# Patient Record
Sex: FEMALE | Race: WHITE | NOT HISPANIC OR LATINO | Employment: UNEMPLOYED | URBAN - METROPOLITAN AREA
[De-identification: names, ages, dates, MRNs, and addresses within clinical notes are randomized per-mention and may not be internally consistent; named-entity substitution may affect disease eponyms.]

---

## 2021-09-16 ENCOUNTER — HOSPITAL ENCOUNTER (EMERGENCY)
Facility: HOSPITAL | Age: 31
Discharge: HOME | End: 2021-09-16
Attending: EMERGENCY MEDICINE | Admitting: EMERGENCY MEDICINE

## 2021-09-16 ENCOUNTER — APPOINTMENT (OUTPATIENT)
Dept: GENERAL RADIOLOGY | Facility: HOSPITAL | Age: 31
End: 2021-09-16

## 2021-09-16 VITALS
DIASTOLIC BLOOD PRESSURE: 77 MMHG | RESPIRATION RATE: 18 BRPM | SYSTOLIC BLOOD PRESSURE: 130 MMHG | HEIGHT: 59 IN | OXYGEN SATURATION: 95 % | HEART RATE: 100 BPM | BODY MASS INDEX: 52.09 KG/M2 | TEMPERATURE: 98.9 F | WEIGHT: 258.38 LBS

## 2021-09-16 DIAGNOSIS — E86.0 DEHYDRATION: ICD-10-CM

## 2021-09-16 DIAGNOSIS — R52 GENERALIZED BODY ACHES: ICD-10-CM

## 2021-09-16 DIAGNOSIS — Z20.822 SUSPECTED COVID-19 VIRUS INFECTION: ICD-10-CM

## 2021-09-16 DIAGNOSIS — B34.9 VIRAL SYNDROME: Primary | ICD-10-CM

## 2021-09-16 LAB
ALBUMIN SERPL-MCNC: 3.9 G/DL (ref 3.5–5.2)
ALBUMIN/GLOB SERPL: 1 G/DL
ALP SERPL-CCNC: 110 U/L (ref 39–117)
ALT SERPL W P-5'-P-CCNC: 16 U/L (ref 1–33)
ANION GAP SERPL CALCULATED.3IONS-SCNC: 15.4 MMOL/L (ref 5–15)
AST SERPL-CCNC: 18 U/L (ref 1–32)
BASOPHILS # BLD AUTO: 0.03 10*3/MM3 (ref 0–0.2)
BASOPHILS NFR BLD AUTO: 0.2 % (ref 0–1.5)
BILIRUB SERPL-MCNC: 0.6 MG/DL (ref 0–1.2)
BUN SERPL-MCNC: 11 MG/DL (ref 6–20)
BUN/CREAT SERPL: 11.6 (ref 7–25)
CALCIUM SPEC-SCNC: 8.8 MG/DL (ref 8.6–10.5)
CHLORIDE SERPL-SCNC: 99 MMOL/L (ref 98–107)
CO2 SERPL-SCNC: 19.6 MMOL/L (ref 22–29)
CREAT SERPL-MCNC: 0.95 MG/DL (ref 0.57–1)
DEPRECATED RDW RBC AUTO: 40.7 FL (ref 37–54)
EOSINOPHIL # BLD AUTO: 0 10*3/MM3 (ref 0–0.4)
EOSINOPHIL NFR BLD AUTO: 0 % (ref 0.3–6.2)
ERYTHROCYTE [DISTWIDTH] IN BLOOD BY AUTOMATED COUNT: 12.9 % (ref 12.3–15.4)
GFR SERPL CREATININE-BSD FRML MDRD: 68 ML/MIN/1.73
GLOBULIN UR ELPH-MCNC: 4.1 GM/DL
GLUCOSE SERPL-MCNC: 244 MG/DL (ref 65–99)
HCG INTACT+B SERPL-ACNC: <0.5 MIU/ML
HCT VFR BLD AUTO: 40.7 % (ref 34–46.6)
HGB BLD-MCNC: 13.8 G/DL (ref 12–15.9)
HOLD SPECIMEN: NORMAL
HOLD SPECIMEN: NORMAL
IMM GRANULOCYTES # BLD AUTO: 0.1 10*3/MM3 (ref 0–0.05)
IMM GRANULOCYTES NFR BLD AUTO: 0.8 % (ref 0–0.5)
LIPASE SERPL-CCNC: 24 U/L (ref 13–60)
LYMPHOCYTES # BLD AUTO: 1.23 10*3/MM3 (ref 0.7–3.1)
LYMPHOCYTES NFR BLD AUTO: 9.9 % (ref 19.6–45.3)
MCH RBC QN AUTO: 29.7 PG (ref 26.6–33)
MCHC RBC AUTO-ENTMCNC: 33.9 G/DL (ref 31.5–35.7)
MCV RBC AUTO: 87.5 FL (ref 79–97)
MONOCYTES # BLD AUTO: 1.09 10*3/MM3 (ref 0.1–0.9)
MONOCYTES NFR BLD AUTO: 8.8 % (ref 5–12)
NEUTROPHILS NFR BLD AUTO: 80.3 % (ref 42.7–76)
NEUTROPHILS NFR BLD AUTO: 9.96 10*3/MM3 (ref 1.7–7)
NRBC BLD AUTO-RTO: 0 /100 WBC (ref 0–0.2)
PLATELET # BLD AUTO: 177 10*3/MM3 (ref 140–450)
PMV BLD AUTO: 10.4 FL (ref 6–12)
POTASSIUM SERPL-SCNC: 3.3 MMOL/L (ref 3.5–5.2)
PROT SERPL-MCNC: 8 G/DL (ref 6–8.5)
RBC # BLD AUTO: 4.65 10*6/MM3 (ref 3.77–5.28)
SARS-COV-2 N GENE RESP QL NAA+PROBE: NOT DETECTED
SODIUM SERPL-SCNC: 134 MMOL/L (ref 136–145)
WBC # BLD AUTO: 12.41 10*3/MM3 (ref 3.4–10.8)
WHOLE BLOOD HOLD SPECIMEN: NORMAL
WHOLE BLOOD HOLD SPECIMEN: NORMAL

## 2021-09-16 PROCEDURE — 80053 COMPREHEN METABOLIC PANEL: CPT | Performed by: EMERGENCY MEDICINE

## 2021-09-16 PROCEDURE — C9803 HOPD COVID-19 SPEC COLLECT: HCPCS | Performed by: PHYSICIAN ASSISTANT

## 2021-09-16 PROCEDURE — 96374 THER/PROPH/DIAG INJ IV PUSH: CPT

## 2021-09-16 PROCEDURE — 71045 X-RAY EXAM CHEST 1 VIEW: CPT

## 2021-09-16 PROCEDURE — 99283 EMERGENCY DEPT VISIT LOW MDM: CPT

## 2021-09-16 PROCEDURE — 85025 COMPLETE CBC W/AUTO DIFF WBC: CPT | Performed by: EMERGENCY MEDICINE

## 2021-09-16 PROCEDURE — 87635 SARS-COV-2 COVID-19 AMP PRB: CPT | Performed by: PHYSICIAN ASSISTANT

## 2021-09-16 PROCEDURE — 84702 CHORIONIC GONADOTROPIN TEST: CPT | Performed by: EMERGENCY MEDICINE

## 2021-09-16 PROCEDURE — 25010000002 KETOROLAC TROMETHAMINE PER 15 MG: Performed by: PHYSICIAN ASSISTANT

## 2021-09-16 PROCEDURE — 83690 ASSAY OF LIPASE: CPT | Performed by: EMERGENCY MEDICINE

## 2021-09-16 RX ORDER — ALBUTEROL SULFATE 90 UG/1
2 AEROSOL, METERED RESPIRATORY (INHALATION) EVERY 4 HOURS PRN
Qty: 90 G | Refills: 0 | Status: SHIPPED | OUTPATIENT
Start: 2021-09-16 | End: 2022-12-08

## 2021-09-16 RX ORDER — KETOROLAC TROMETHAMINE 10 MG/1
10 TABLET, FILM COATED ORAL EVERY 6 HOURS PRN
Qty: 20 TABLET | Refills: 0 | Status: SHIPPED | OUTPATIENT
Start: 2021-09-16 | End: 2022-12-08

## 2021-09-16 RX ORDER — SODIUM CHLORIDE 0.9 % (FLUSH) 0.9 %
10 SYRINGE (ML) INJECTION AS NEEDED
Status: DISCONTINUED | OUTPATIENT
Start: 2021-09-16 | End: 2021-09-16 | Stop reason: HOSPADM

## 2021-09-16 RX ORDER — ONDANSETRON 4 MG/1
4 TABLET, ORALLY DISINTEGRATING ORAL 4 TIMES DAILY PRN
Qty: 12 TABLET | Refills: 0 | Status: SHIPPED | OUTPATIENT
Start: 2021-09-16 | End: 2022-12-10 | Stop reason: SDUPTHER

## 2021-09-16 RX ORDER — KETOROLAC TROMETHAMINE 30 MG/ML
30 INJECTION, SOLUTION INTRAMUSCULAR; INTRAVENOUS ONCE
Status: COMPLETED | OUTPATIENT
Start: 2021-09-16 | End: 2021-09-16

## 2021-09-16 RX ADMIN — SODIUM CHLORIDE 1000 ML: 9 INJECTION, SOLUTION INTRAVENOUS at 16:31

## 2021-09-16 RX ADMIN — KETOROLAC TROMETHAMINE 30 MG: 30 INJECTION, SOLUTION INTRAMUSCULAR; INTRAVENOUS at 16:32

## 2021-09-16 NOTE — ED PROVIDER NOTES
"Subjective   31 y.o. female presents with complaints of body aches, intermittent fevers (Tmax 100), chills, and dizziness for the last three days. She states she's be eating and drinking less due to abnormal taste since onset of symptoms. She is not currently experiencing nausea, but has has some episodes with food intake. She denies any abdominal pain, chest pain, shortness of breath, trouble breathing. Unsure of sick contacts. Patient feels \"dehydrated.\" No other complaints at this time. No health history to report.       History provided by:  Patient   used: No    Dizziness  Quality:  Lightheadedness  Severity:  Mild  Onset quality:  Gradual  Duration:  3 days  Timing:  Intermittent  Progression:  Resolved  Context: bending over and physical activity    Relieved by:  Being still and closing eyes  Worsened by:  Movement and standing up  Ineffective treatments:  Lying down  Associated symptoms: diarrhea and nausea    Associated symptoms: no blood in stool, no chest pain, no headaches, no hearing loss, no palpitations, no shortness of breath, no syncope, no tinnitus, no vision changes, no vomiting and no weakness    Diarrhea:     Quality:  Semi-solid    Number of occurrences:  3-4    Severity:  Mild    Duration:  3 days    Timing:  Intermittent    Progression:  Partially resolved  Nausea:     Severity:  Mild    Onset quality:  Sudden    Duration:  3 days    Timing:  Intermittent    Progression:  Resolved  Fever  Max temp prior to arrival:  100  Temp source:  Oral  Severity:  Mild  Onset quality:  Sudden  Duration:  3 days  Timing:  Intermittent  Progression:  Waxing and waning  Chronicity:  New  Relieved by:  Acetaminophen  Worsened by:  Nothing  Associated symptoms: chills, diarrhea, myalgias and nausea    Associated symptoms: no chest pain, no confusion, no congestion, no cough, no dysuria, no ear pain, no headaches, no rash, no rhinorrhea, no somnolence, no sore throat and no vomiting  "   Diarrhea:     Quality:  Semi-solid    Number of occurrences:  3-4    Severity:  Mild    Duration:  3 days    Timing:  Intermittent    Progression:  Partially resolved  Myalgias:     Location:  Generalized    Quality:  Aching    Severity:  Moderate    Onset quality:  Gradual    Timing:  Constant    Progression:  Worsening  Chills  Associated symptoms: diarrhea, fever, myalgias and nausea    Associated symptoms: no abdominal pain, no chest pain, no congestion, no cough, no ear pain, no headaches, no rash, no rhinorrhea, no shortness of breath, no sore throat and no vomiting        Review of Systems   Constitutional: Positive for chills and fever.   HENT: Negative for congestion, ear pain, hearing loss, rhinorrhea, sore throat and tinnitus.    Eyes: Negative for pain.   Respiratory: Negative for cough, chest tightness and shortness of breath.    Cardiovascular: Negative for chest pain, palpitations and syncope.   Gastrointestinal: Positive for diarrhea and nausea. Negative for abdominal pain, blood in stool and vomiting.   Genitourinary: Negative for dysuria, flank pain and hematuria.   Musculoskeletal: Positive for myalgias. Negative for joint swelling.   Skin: Negative for pallor and rash.   Neurological: Positive for dizziness. Negative for seizures, weakness and headaches.   Psychiatric/Behavioral: Negative for confusion.   All other systems reviewed and are negative.      History reviewed. No pertinent past medical history.    No Known Allergies    History reviewed. No pertinent surgical history.    History reviewed. No pertinent family history.    Social History     Socioeconomic History   • Marital status: Single     Spouse name: Not on file   • Number of children: Not on file   • Years of education: Not on file   • Highest education level: Not on file   Tobacco Use   • Smoking status: Never Smoker   • Smokeless tobacco: Never Used   Vaping Use   • Vaping Use: Never assessed   Substance and Sexual Activity   •  Alcohol use: Never   • Drug use: Never   • Sexual activity: Defer           Objective   Physical Exam  Vitals and nursing note reviewed.   Constitutional:       General: She is not in acute distress.     Appearance: Normal appearance. She is not toxic-appearing.   HENT:      Head: Normocephalic and atraumatic.      Mouth/Throat:      Mouth: Mucous membranes are moist.   Eyes:      Extraocular Movements: Extraocular movements intact.      Pupils: Pupils are equal, round, and reactive to light.   Cardiovascular:      Rate and Rhythm: Normal rate and regular rhythm.      Pulses: Normal pulses.      Heart sounds: Normal heart sounds.   Pulmonary:      Effort: Pulmonary effort is normal. No respiratory distress.      Breath sounds: Normal breath sounds.   Abdominal:      General: Abdomen is flat.      Palpations: Abdomen is soft.      Tenderness: There is no abdominal tenderness.   Musculoskeletal:         General: Normal range of motion.      Cervical back: Normal range of motion and neck supple.   Skin:     General: Skin is warm and dry.   Neurological:      Mental Status: She is alert and oriented to person, place, and time. Mental status is at baseline.         Procedures           ED Course                                           MDM  Number of Diagnoses or Management Options  Diagnosis management comments: The patient is well-appearing and in no respiratory distress.  The patient was tested for COVID-19 in the emergency department and is currently awaiting results.  The patient has a normal mental status and is neurologically intact. The patient appears well and is able to tolerate food for fluid by mouth and there's no significant dehydration. There is no respiratory distress and no signs of systemic toxicity. The history, exam, diagnostic testing and current condition do not demonstrate an infectious process such as meningitis, retropharyngeal abscess, epiglottitis, sepsis or other serious bacterial infection  requiring further testing, treatment, consultation, or admission at this time. The patient has no additional oxygen requirement. The patient has a PORT score that is suitable for outpatient treatment.  The patient has no respiratory distress, or hypoxia.  The vital signs have been stable. The patient's condition is stable and appropriate for discharge. The possible party was advised to return for worsening fever, respiratory distress, intractable vomiting, weakness, shortness of breath, chest pain, or altered mental status. The responsible party will pursue further outpatient evaluation with the primary care physician. The possible party has expressed a clear and thorough understanding and agreed to follow-up as instructed.       Amount and/or Complexity of Data Reviewed  Clinical lab tests: reviewed and ordered  Tests in the radiology section of CPT®: reviewed and ordered    Risk of Complications, Morbidity, and/or Mortality  Presenting problems: low  Diagnostic procedures: low  Management options: low  General comments: Return to ED for any new or worsening symptoms including shortness of breath, chest pain, difficultly breathing, abdominal pain.     Patient Progress  Patient progress: stable      Final diagnoses:   Suspected COVID-19 virus infection   Dehydration   Generalized body aches   Viral syndrome       ED Disposition  ED Disposition     ED Disposition Condition Comment    Discharge Stable           No follow-up provider specified.       Medication List      New Prescriptions    albuterol sulfate  (90 Base) MCG/ACT inhaler  Commonly known as: PROVENTIL HFA;VENTOLIN HFA;PROAIR HFA  Inhale 2 puffs Every 4 (Four) Hours As Needed for Wheezing.     ketorolac 10 MG tablet  Commonly known as: TORADOL  Take 1 tablet by mouth Every 6 (Six) Hours As Needed for Moderate Pain .     ondansetron ODT 4 MG disintegrating tablet  Commonly known as: ZOFRAN-ODT  Place 1 tablet under the tongue 4 (Four) Times a Day As  Needed for Nausea or Vomiting.           Where to Get Your Medications      These medications were sent to [id number] - [place], [place] - [address] AT [facility] [phone] [facility] [phone]   [address] [place] [address]    Phone: [phone]   · albuterol sulfate  (90 Base) MCG/ACT inhaler  · ketorolac 10 MG tablet  · ondansetron ODT 4 MG disintegrating tablet          Shiela Park, PAWyattC  09/16/21 3128

## 2022-12-08 ENCOUNTER — INITIAL PRENATAL (OUTPATIENT)
Dept: OBSTETRICS AND GYNECOLOGY | Facility: CLINIC | Age: 32
End: 2022-12-08

## 2022-12-08 ENCOUNTER — TELEPHONE (OUTPATIENT)
Dept: OBSTETRICS AND GYNECOLOGY | Facility: CLINIC | Age: 32
End: 2022-12-08

## 2022-12-08 VITALS — DIASTOLIC BLOOD PRESSURE: 90 MMHG | WEIGHT: 267 LBS | BODY MASS INDEX: 53.93 KG/M2 | SYSTOLIC BLOOD PRESSURE: 151 MMHG

## 2022-12-08 DIAGNOSIS — E74.818 OTHER DISORDERS OF GLUCOSE TRANSPORT: ICD-10-CM

## 2022-12-08 DIAGNOSIS — Z12.4 ENCOUNTER FOR SCREENING FOR MALIGNANT NEOPLASM OF CERVIX: ICD-10-CM

## 2022-12-08 DIAGNOSIS — R39.9 UNSPECIFIED SYMPTOMS AND SIGNS INVOLVING THE GENITOURINARY SYSTEM: ICD-10-CM

## 2022-12-08 DIAGNOSIS — Z34.80 SUPERVISION OF OTHER NORMAL PREGNANCY, ANTEPARTUM: Primary | ICD-10-CM

## 2022-12-08 DIAGNOSIS — O99.210 MATERNAL OBESITY AFFECTING PREGNANCY, ANTEPARTUM: ICD-10-CM

## 2022-12-08 DIAGNOSIS — O09.219 PREVIOUS PRETERM DELIVERY, ANTEPARTUM: ICD-10-CM

## 2022-12-08 DIAGNOSIS — Z11.59 ENCOUNTER FOR SCREENING FOR OTHER VIRAL DISEASES: ICD-10-CM

## 2022-12-08 DIAGNOSIS — Z87.59 HISTORY OF GESTATIONAL HYPERTENSION: ICD-10-CM

## 2022-12-08 DIAGNOSIS — O09.299 HISTORY OF GESTATIONAL DIABETES IN PRIOR PREGNANCY, CURRENTLY PREGNANT: ICD-10-CM

## 2022-12-08 DIAGNOSIS — Z3A.19 19 WEEKS GESTATION OF PREGNANCY: ICD-10-CM

## 2022-12-08 DIAGNOSIS — Z86.32 HISTORY OF GESTATIONAL DIABETES IN PRIOR PREGNANCY, CURRENTLY PREGNANT: ICD-10-CM

## 2022-12-08 DIAGNOSIS — Z98.891 PREVIOUS CESAREAN SECTION: ICD-10-CM

## 2022-12-08 LAB
ABO GROUP BLD: NORMAL
B-HCG UR QL: POSITIVE
BILIRUB UR QL STRIP: NEGATIVE
BLD GP AB SCN SERPL QL: NEGATIVE
CLARITY UR: ABNORMAL
COLOR UR: YELLOW
CREAT UR-MCNC: 231.8 MG/DL
EXPIRATION DATE: ABNORMAL
GLUCOSE UR STRIP-MCNC: NEGATIVE MG/DL
GLUCOSE UR STRIP-MCNC: NEGATIVE MG/DL
HGB UR QL STRIP.AUTO: NEGATIVE
HIV1+2 AB SER QL: NORMAL
INTERNAL NEGATIVE CONTROL: NEGATIVE
INTERNAL POSITIVE CONTROL: POSITIVE
KETONES UR QL STRIP: ABNORMAL
LEUKOCYTE ESTERASE UR QL STRIP.AUTO: ABNORMAL
Lab: ABNORMAL
NITRITE UR QL STRIP: NEGATIVE
PH UR STRIP.AUTO: 5.5 [PH] (ref 5–8)
PROT ?TM UR-MCNC: 58.5 MG/DL
PROT UR QL STRIP: ABNORMAL
PROT UR STRIP-MCNC: NEGATIVE MG/DL
PROT/CREAT UR: 0.25 MG/G{CREAT}
RH BLD: POSITIVE
SP GR UR STRIP: 1.03 (ref 1–1.03)
UROBILINOGEN UR QL STRIP: ABNORMAL

## 2022-12-08 PROCEDURE — 86803 HEPATITIS C AB TEST: CPT | Performed by: NURSE PRACTITIONER

## 2022-12-08 PROCEDURE — 83036 HEMOGLOBIN GLYCOSYLATED A1C: CPT | Performed by: NURSE PRACTITIONER

## 2022-12-08 PROCEDURE — 82570 ASSAY OF URINE CREATININE: CPT | Performed by: NURSE PRACTITIONER

## 2022-12-08 PROCEDURE — 36415 COLL VENOUS BLD VENIPUNCTURE: CPT | Performed by: NURSE PRACTITIONER

## 2022-12-08 PROCEDURE — 87661 TRICHOMONAS VAGINALIS AMPLIF: CPT | Performed by: NURSE PRACTITIONER

## 2022-12-08 PROCEDURE — 84156 ASSAY OF PROTEIN URINE: CPT | Performed by: NURSE PRACTITIONER

## 2022-12-08 PROCEDURE — 83020 HEMOGLOBIN ELECTROPHORESIS: CPT | Performed by: NURSE PRACTITIONER

## 2022-12-08 PROCEDURE — 87086 URINE CULTURE/COLONY COUNT: CPT | Performed by: NURSE PRACTITIONER

## 2022-12-08 PROCEDURE — 87491 CHLMYD TRACH DNA AMP PROBE: CPT | Performed by: NURSE PRACTITIONER

## 2022-12-08 PROCEDURE — 81025 URINE PREGNANCY TEST: CPT | Performed by: NURSE PRACTITIONER

## 2022-12-08 PROCEDURE — 99204 OFFICE O/P NEW MOD 45 MIN: CPT | Performed by: NURSE PRACTITIONER

## 2022-12-08 PROCEDURE — 87591 N.GONORRHOEAE DNA AMP PROB: CPT | Performed by: NURSE PRACTITIONER

## 2022-12-08 PROCEDURE — G0123 SCREEN CERV/VAG THIN LAYER: HCPCS | Performed by: NURSE PRACTITIONER

## 2022-12-08 PROCEDURE — 87624 HPV HI-RISK TYP POOLED RSLT: CPT | Performed by: NURSE PRACTITIONER

## 2022-12-08 PROCEDURE — 81001 URINALYSIS AUTO W/SCOPE: CPT | Performed by: NURSE PRACTITIONER

## 2022-12-08 PROCEDURE — 80053 COMPREHEN METABOLIC PANEL: CPT | Performed by: NURSE PRACTITIONER

## 2022-12-08 PROCEDURE — G0432 EIA HIV-1/HIV-2 SCREEN: HCPCS | Performed by: NURSE PRACTITIONER

## 2022-12-08 RX ORDER — ASPIRIN 81 MG/1
81 TABLET ORAL DAILY
Qty: 90 TABLET | Refills: 3 | Status: SHIPPED | OUTPATIENT
Start: 2022-12-08

## 2022-12-08 RX ORDER — BLOOD-GLUCOSE METER
1 KIT MISCELLANEOUS 4 TIMES DAILY
Qty: 1 KIT | Refills: 0 | Status: SHIPPED | OUTPATIENT
Start: 2022-12-08

## 2022-12-08 RX ORDER — LANCETS 28 GAUGE
EACH MISCELLANEOUS
Qty: 200 EACH | Refills: 12 | Status: SHIPPED | OUTPATIENT
Start: 2022-12-08

## 2022-12-08 NOTE — TELEPHONE ENCOUNTER
Caller: Rosie Bradley    Relationship: Self    Best call back number: [phone]    What is the best time to reach you:-CALL ANYTIME, IT IS OKAY TO LVM.    Who are you requesting to speak with (clinical staff, provider,  specific staff member): FIRST AVAILABLE    Do you require a callback: YES, PT WAS TOLD SHE WOULD GET A CALL BACK FROM [facility] REGARDING BLOOD GLUCOSE MONITORING SUPPLIES. PT HAS NOT RECEIVED A CALL BACK.    PHARMACY TOLD PT THE BLOOD READER IS NOT COVERED BY INSURANCE. PT CAN NOT AFFORD TO PAY OUT OF POCKET AT THIS TIME.

## 2022-12-08 NOTE — PROGRESS NOTES
OB Initial Visit    CC- Here for care of current pregnancy, first visit    Subjective:  32 y.o.  presenting for her first obstetrical visit.    LMP: Patient's last menstrual period was 2022.     COMPLAINS OF: Pt has no complaints, is doing well    States had a  at 36w6d with previous pregnancy at [facility] for high blood pressure, was transferred from [place].     Reviewed and updated:  OBHx, GYNHx (STDs), PMHx, Medications, Allergies, PSHx, Social Hx, Preventative Hx (PAP), Hx of abuse/safe environment, Vaccine Hx including hx of chickenpox or vaccine, Genetic Hx (pt, FOB, both families).        Objective:  /90   Wt 121 kg (267 lb)   LMP 2022   BMI 53.93 kg/m²      Urine pregnancy test is positive     General- NAD, alert and oriented, appropriate  Psych- Normal mood, good memory  Neck- No masses, no thyroid enlargement  CV- Regular rhythm, no murnurs  Resp- CTA to bases, no wheezes  Abdomen- Soft, non distended, non tender, no masses    Breast left- deferred  Breast right- deferred    External genitalia- Normal, no lesions  Urethra- Normal, no masses, non tender  Vagina- Normal, no discharge  Bladder- Normal, no masses, non tender  Cvx- Normal, no lesions, no discharge, no CMT  Uterus- Normal shape and consistency, non tender, Consistent with dates, .    Adnexa- Normal, no mass, non tender    Lymphatic- No palpable neck, axillary, or groin nodes  Ext- No edema, no cyanosis    Skin- No lesions, no rashes, no acanthosis nigricans    Assessment and Plan:  19w4d  Diagnoses and all orders for this visit:    1. Supervision of other normal pregnancy, antepartum (Primary)  Overview:  DUDLEY finalized: 23 per LMP, anatomy scan ordered    Genetic testing (NIPS-Quad)/CF/AFP:  CF previously and negative.  NIPS drawn at new OB, desires AFP but will need to wait until after ultrasound for insurance purposes    COVID: Fully vaccinated  Flu: Recommended  22  Tdap:    Rhogam:  ?Sterilization:    Anatomy US:  FU US:    PROBLEM LIST/PLAN:   Hx of GDM/prediabetes - check A1C, start QID blood sugar checks  Hx of Gest. HTN - baseline labs, start ASA 81 mg daily  Previous  - vertical skin incision, will request op note  Maternal obesity - start ASA 81 mg   delivery - states delivered at 36w6d due to gestational hypertension         Orders:  -     POC Urinalysis Dipstick  -     IGP,CtNgTv,Apt HPV,rfx 16 / 18,45  -     OB Panel With HIV  -     Urinalysis With Microscopic - Urine, Clean Catch  -     Urine Culture - Urine, Urine, Random Void  -     Hemoglobinopathy Fractionation [place]  -     POC Pregnancy, Urine  -     INVITAE NIPS  -     US Ob Detail Fetal Anatomy Single or First Gestation; Future  -     Blood Glucose Monitoring Suppl (FreeStyle Lite) w/Device kit; 1 each 4 (Four) Times a Day. Use to check blood glucose before breakfast and two hours after meals  Dispense: 1 kit; Refill: 0  -     Lancets (freestyle) lancets; Use to check blood glucose before breakfast and two hours after meals  Dispense: 200 each; Refill: 12  -     glucose blood (FREESTYLE LITE) test strip; Use to check blood glucose before breakfast and two hours after meals  Dispense: 200 each; Refill: 12    2. 19 weeks gestation of pregnancy    3. Encounter for screening for malignant neoplasm of cervix  -     IGP,CtNgTv,Apt HPV,rfx 16 / 18,45    4. Encounter for screening for other viral diseases  -     OB Panel With HIV    5. Unspecified symptoms and signs involving the genitourinary system  -     Urine Culture - Urine, Urine, Random Void    6. Other disorders of glucose transport (HCC)  -     Hemoglobin A1c    7. Previous  section  Overview:  Patient will vertical skin incision, will request op report      8. History of gestational hypertension  Overview:  Baseline labs, start ASA 81 mg    Orders:  -     Comprehensive Metabolic Panel  -     Protein / Creatinine Ratio,  Urine - Urine, Clean Catch  -     aspirin 81 MG EC tablet; Take 1 tablet by mouth Daily.  Dispense: 90 tablet; Refill: 3    9. History of gestational diabetes in prior pregnancy, currently pregnant  Overview:  States had GDM with previous pregnancy and is considered pre-diabetic.  Will check A1C and start QID blood glucose checks.     Orders:  -     Hemoglobin A1c  -     Blood Glucose Monitoring Suppl (FreeStyle Lite) w/Device kit; 1 each 4 (Four) Times a Day. Use to check blood glucose before breakfast and two hours after meals  Dispense: 1 kit; Refill: 0  -     Lancets (freestyle) lancets; Use to check blood glucose before breakfast and two hours after meals  Dispense: 200 each; Refill: 12  -     glucose blood (FREESTYLE LITE) test strip; Use to check blood glucose before breakfast and two hours after meals  Dispense: 200 each; Refill: 12    10. Maternal obesity affecting pregnancy, antepartum  Overview:  Start QID blood sugar checks, ASA 81 mg      11. Previous  delivery, antepartum  Overview:  Delivered at 36w6d due to gestational hypertension        Genetic Screening:   • NIPS    Vaccines:   • Recommend FLU vaccine this season, R/B discussed  • s/p COVID vaccine    Counseling:   • Nutrition discussed, calories, activity/exercise in pregnancy  • Discussed dietary restrictions/safety food preparation in pregnancy  • Reviewed what to expect prenatal visits, office providers and [facility] hospitalists  • Appropriate trimester precautions provided, N/V, vag bleeding, cramping  • Questions answered    Labs:   • Prenatal labs, cultures, and PAP performed (prn), CMP, Upc ratio, NIPS    Return in about 4 weeks (around 2023) for [facility] Office, OB follow up.      Margarito Jimenez, APRN  2022    [facility] [address]  [facility]  [address] [place] [address]  Dept: [phone]  Loc: [phone]

## 2022-12-09 PROBLEM — O09.899 RUBELLA NON-IMMUNE STATUS, ANTEPARTUM: Status: ACTIVE | Noted: 2022-12-09

## 2022-12-09 PROBLEM — Z28.39 RUBELLA NON-IMMUNE STATUS, ANTEPARTUM: Status: ACTIVE | Noted: 2022-12-09

## 2022-12-09 LAB
ALBUMIN SERPL-MCNC: 3.7 G/DL (ref 3.5–5.2)
ALBUMIN/GLOB SERPL: 1.3 G/DL
ALP SERPL-CCNC: 75 U/L (ref 39–117)
ALT SERPL W P-5'-P-CCNC: 12 U/L (ref 1–33)
ANION GAP SERPL CALCULATED.3IONS-SCNC: 11.1 MMOL/L (ref 5–15)
AST SERPL-CCNC: 18 U/L (ref 1–32)
BACTERIA UR QL AUTO: ABNORMAL /HPF
BASOPHILS # BLD AUTO: 0.02 10*3/MM3 (ref 0–0.2)
BASOPHILS NFR BLD AUTO: 0.2 % (ref 0–1.5)
BILIRUB SERPL-MCNC: <0.2 MG/DL (ref 0–1.2)
BUN SERPL-MCNC: 8 MG/DL (ref 6–20)
BUN/CREAT SERPL: 17.4 (ref 7–25)
CALCIUM SPEC-SCNC: 9 MG/DL (ref 8.6–10.5)
CHLORIDE SERPL-SCNC: 105 MMOL/L (ref 98–107)
CO2 SERPL-SCNC: 19.9 MMOL/L (ref 22–29)
CREAT SERPL-MCNC: 0.46 MG/DL (ref 0.57–1)
DEPRECATED RDW RBC AUTO: 43.4 FL (ref 37–54)
EGFRCR SERPLBLD CKD-EPI 2021: 129 ML/MIN/1.73
EOSINOPHIL # BLD AUTO: 0.17 10*3/MM3 (ref 0–0.4)
EOSINOPHIL NFR BLD AUTO: 1.8 % (ref 0.3–6.2)
ERYTHROCYTE [DISTWIDTH] IN BLOOD BY AUTOMATED COUNT: 13.2 % (ref 12.3–15.4)
GLOBULIN UR ELPH-MCNC: 2.9 GM/DL
GLUCOSE SERPL-MCNC: 96 MG/DL (ref 65–99)
HBA1C MFR BLD: 5.1 % (ref 4.8–5.6)
HBV SURFACE AG SERPL QL IA: NORMAL
HCT VFR BLD AUTO: 35.8 % (ref 34–46.6)
HCV AB SER DONR QL: NORMAL
HGB A MFR BLD ELPH: 97.3 % (ref 96.4–98.8)
HGB A2 MFR BLD ELPH: 2.7 % (ref 1.8–3.2)
HGB BLD-MCNC: 11.7 G/DL (ref 12–15.9)
HGB F MFR BLD ELPH: 0 % (ref 0–2)
HGB FRACT BLD-IMP: NORMAL
HGB S MFR BLD ELPH: 0 %
HYALINE CASTS UR QL AUTO: ABNORMAL /LPF
IMM GRANULOCYTES # BLD AUTO: 0.07 10*3/MM3 (ref 0–0.05)
IMM GRANULOCYTES NFR BLD AUTO: 0.7 % (ref 0–0.5)
LYMPHOCYTES # BLD AUTO: 1.52 10*3/MM3 (ref 0.7–3.1)
LYMPHOCYTES NFR BLD AUTO: 16.3 % (ref 19.6–45.3)
MCH RBC QN AUTO: 29.6 PG (ref 26.6–33)
MCHC RBC AUTO-ENTMCNC: 32.7 G/DL (ref 31.5–35.7)
MCV RBC AUTO: 90.6 FL (ref 79–97)
MONOCYTES # BLD AUTO: 0.55 10*3/MM3 (ref 0.1–0.9)
MONOCYTES NFR BLD AUTO: 5.9 % (ref 5–12)
NEUTROPHILS NFR BLD AUTO: 7.01 10*3/MM3 (ref 1.7–7)
NEUTROPHILS NFR BLD AUTO: 75.1 % (ref 42.7–76)
NRBC BLD AUTO-RTO: 0 /100 WBC (ref 0–0.2)
PLATELET # BLD AUTO: 166 10*3/MM3 (ref 140–450)
PMV BLD AUTO: 11.1 FL (ref 6–12)
POTASSIUM SERPL-SCNC: 3.6 MMOL/L (ref 3.5–5.2)
PROT SERPL-MCNC: 6.6 G/DL (ref 6–8.5)
RBC # BLD AUTO: 3.95 10*6/MM3 (ref 3.77–5.28)
RBC # UR STRIP: ABNORMAL /HPF
REF LAB TEST METHOD: ABNORMAL
RPR SER QL: NORMAL
RUBV IGG SERPL IA-ACNC: <0.9 INDEX
SODIUM SERPL-SCNC: 136 MMOL/L (ref 136–145)
SQUAMOUS #/AREA URNS HPF: ABNORMAL /HPF
WBC # UR STRIP: ABNORMAL /HPF
WBC NRBC COR # BLD: 9.34 10*3/MM3 (ref 3.4–10.8)

## 2022-12-10 ENCOUNTER — HOSPITAL ENCOUNTER (EMERGENCY)
Facility: HOSPITAL | Age: 32
Discharge: HOME | End: 2022-12-11
Attending: EMERGENCY MEDICINE | Admitting: EMERGENCY MEDICINE

## 2022-12-10 DIAGNOSIS — O23.12 ACUTE CYSTITIS DURING PREGNANCY IN SECOND TRIMESTER: Primary | ICD-10-CM

## 2022-12-10 DIAGNOSIS — R30.0 DYSURIA: ICD-10-CM

## 2022-12-10 DIAGNOSIS — Z87.59 HISTORY OF GESTATIONAL HYPERTENSION: ICD-10-CM

## 2022-12-10 LAB
ALBUMIN SERPL-MCNC: 3.8 G/DL (ref 3.5–5.2)
ALBUMIN/GLOB SERPL: 1.3 G/DL
ALP SERPL-CCNC: 77 U/L (ref 39–117)
ALT SERPL W P-5'-P-CCNC: 14 U/L (ref 1–33)
ANION GAP SERPL CALCULATED.3IONS-SCNC: 12.7 MMOL/L (ref 5–15)
AST SERPL-CCNC: 14 U/L (ref 1–32)
BACTERIA SPEC AEROBE CULT: NORMAL
BACTERIA UR QL AUTO: ABNORMAL /HPF
BASOPHILS # BLD AUTO: 0.01 10*3/MM3 (ref 0–0.2)
BASOPHILS NFR BLD AUTO: 0.1 % (ref 0–1.5)
BILIRUB SERPL-MCNC: 0.2 MG/DL (ref 0–1.2)
BILIRUB UR QL STRIP: NEGATIVE
BUN SERPL-MCNC: 10 MG/DL (ref 6–20)
BUN/CREAT SERPL: 20.4 (ref 7–25)
CALCIUM SPEC-SCNC: 9.3 MG/DL (ref 8.6–10.5)
CHLORIDE SERPL-SCNC: 107 MMOL/L (ref 98–107)
CLARITY UR: ABNORMAL
CO2 SERPL-SCNC: 20.3 MMOL/L (ref 22–29)
COD CRY URNS QL: ABNORMAL /HPF
COLOR UR: YELLOW
CREAT SERPL-MCNC: 0.49 MG/DL (ref 0.57–1)
DEPRECATED RDW RBC AUTO: 45.1 FL (ref 37–54)
EGFRCR SERPLBLD CKD-EPI 2021: 127 ML/MIN/1.73
EOSINOPHIL # BLD AUTO: 0.24 10*3/MM3 (ref 0–0.4)
EOSINOPHIL NFR BLD AUTO: 3.1 % (ref 0.3–6.2)
ERYTHROCYTE [DISTWIDTH] IN BLOOD BY AUTOMATED COUNT: 14.2 % (ref 12.3–15.4)
GLOBULIN UR ELPH-MCNC: 3 GM/DL
GLUCOSE SERPL-MCNC: 144 MG/DL (ref 65–99)
GLUCOSE UR STRIP-MCNC: NEGATIVE MG/DL
HCG INTACT+B SERPL-ACNC: NORMAL MIU/ML
HCT VFR BLD AUTO: 34.4 % (ref 34–46.6)
HGB BLD-MCNC: 11.7 G/DL (ref 12–15.9)
HGB UR QL STRIP.AUTO: NEGATIVE
HOLD SPECIMEN: NORMAL
HOLD SPECIMEN: NORMAL
HYALINE CASTS UR QL AUTO: ABNORMAL /LPF
IMM GRANULOCYTES # BLD AUTO: 0.07 10*3/MM3 (ref 0–0.05)
IMM GRANULOCYTES NFR BLD AUTO: 0.9 % (ref 0–0.5)
KETONES UR QL STRIP: NEGATIVE
LEUKOCYTE ESTERASE UR QL STRIP.AUTO: ABNORMAL
LIPASE SERPL-CCNC: 45 U/L (ref 13–60)
LYMPHOCYTES # BLD AUTO: 1.92 10*3/MM3 (ref 0.7–3.1)
LYMPHOCYTES NFR BLD AUTO: 24.6 % (ref 19.6–45.3)
MCH RBC QN AUTO: 30 PG (ref 26.6–33)
MCHC RBC AUTO-ENTMCNC: 34 G/DL (ref 31.5–35.7)
MCV RBC AUTO: 88.2 FL (ref 79–97)
MONOCYTES # BLD AUTO: 0.5 10*3/MM3 (ref 0.1–0.9)
MONOCYTES NFR BLD AUTO: 6.4 % (ref 5–12)
NEUTROPHILS NFR BLD AUTO: 5.08 10*3/MM3 (ref 1.7–7)
NEUTROPHILS NFR BLD AUTO: 64.9 % (ref 42.7–76)
NITRITE UR QL STRIP: NEGATIVE
NRBC BLD AUTO-RTO: 0 /100 WBC (ref 0–0.2)
PH UR STRIP.AUTO: 5.5 [PH] (ref 5–8)
PLATELET # BLD AUTO: 176 10*3/MM3 (ref 140–450)
PMV BLD AUTO: 10.3 FL (ref 6–12)
POTASSIUM SERPL-SCNC: 3.6 MMOL/L (ref 3.5–5.2)
PROT SERPL-MCNC: 6.8 G/DL (ref 6–8.5)
PROT UR QL STRIP: ABNORMAL
RBC # BLD AUTO: 3.9 10*6/MM3 (ref 3.77–5.28)
RBC # UR STRIP: ABNORMAL /HPF
REF LAB TEST METHOD: ABNORMAL
SODIUM SERPL-SCNC: 140 MMOL/L (ref 136–145)
SP GR UR STRIP: 1.03 (ref 1–1.03)
SQUAMOUS #/AREA URNS HPF: ABNORMAL /HPF
UROBILINOGEN UR QL STRIP: ABNORMAL
WBC # UR STRIP: ABNORMAL /HPF
WBC NRBC COR # BLD: 7.82 10*3/MM3 (ref 3.4–10.8)
WHOLE BLOOD HOLD COAG: NORMAL
WHOLE BLOOD HOLD SPECIMEN: NORMAL

## 2022-12-10 PROCEDURE — 36415 COLL VENOUS BLD VENIPUNCTURE: CPT

## 2022-12-10 PROCEDURE — 99283 EMERGENCY DEPT VISIT LOW MDM: CPT

## 2022-12-10 PROCEDURE — 81001 URINALYSIS AUTO W/SCOPE: CPT

## 2022-12-10 PROCEDURE — 83690 ASSAY OF LIPASE: CPT

## 2022-12-10 PROCEDURE — 85025 COMPLETE CBC W/AUTO DIFF WBC: CPT

## 2022-12-10 PROCEDURE — 80053 COMPREHEN METABOLIC PANEL: CPT

## 2022-12-10 PROCEDURE — 84702 CHORIONIC GONADOTROPIN TEST: CPT

## 2022-12-10 RX ORDER — CEPHALEXIN 500 MG/1
500 CAPSULE ORAL 2 TIMES DAILY
Qty: 14 CAPSULE | Refills: 0 | Status: SHIPPED | OUTPATIENT
Start: 2022-12-10 | End: 2023-02-02

## 2022-12-10 RX ORDER — ONDANSETRON 4 MG/1
4 TABLET, ORALLY DISINTEGRATING ORAL EVERY 8 HOURS PRN
Qty: 15 TABLET | Refills: 0 | Status: SHIPPED | OUTPATIENT
Start: 2022-12-10 | End: 2023-02-02

## 2022-12-10 RX ORDER — SODIUM CHLORIDE 0.9 % (FLUSH) 0.9 %
10 SYRINGE (ML) INJECTION AS NEEDED
Status: DISCONTINUED | OUTPATIENT
Start: 2022-12-10 | End: 2022-12-11 | Stop reason: HOSPADM

## 2022-12-11 VITALS
OXYGEN SATURATION: 99 % | HEART RATE: 108 BPM | WEIGHT: 265.65 LBS | RESPIRATION RATE: 20 BRPM | HEIGHT: 59 IN | BODY MASS INDEX: 53.56 KG/M2 | DIASTOLIC BLOOD PRESSURE: 84 MMHG | SYSTOLIC BLOOD PRESSURE: 141 MMHG | TEMPERATURE: 98.6 F

## 2022-12-11 NOTE — ED PROVIDER NOTES
Time: 7:49 PM EST  Chief Complaint: Dysuria  Chief Complaint   Patient presents with   • Dysuria           History of Present Illness:  Patient is a 32 y.o. year old female who presents to the emergency department with complaints of burning with urination as well as urinary frequency and urgency.  Patient reports when she does urinate at times it is only in small increments.  She states that she noticed some abnormal lab results on her Mychart from her recent OB visit a few days ago that indicated a possible urinary tract infection. She denies any fever. Denies abdominal pain, nausea, vomiting or diarrhea. She is currently 19 weeks gestation. Denies any vaginal bleeding or discharge.           HPI        Patient Care Team  Primary Care Provider: Provider, No Known    Past Medical History:     No Known Allergies  Past Medical History:   Diagnosis Date   • Gestational diabetes    • Pre-diabetes     boarderline diabetes     Past Surgical History:   Procedure Laterality Date   •  SECTION     • MANDIBLE FRACTURE SURGERY     • TONSILLECTOMY     • WISDOM TOOTH EXTRACTION       Family History   Problem Relation Age of Onset   • Diabetes Mother    • Hypertension Sister        Home Medications:  Prior to Admission medications    Medication Sig Start Date End Date Taking? Authorizing Provider   aspirin 81 MG EC tablet Take 1 tablet by mouth Daily. 22   Margarito Jimenez APRN   Blood Glucose Monitoring Suppl (FreeStyle Lite) w/Device kit 1 each 4 (Four) Times a Day. Use to check blood glucose before breakfast and two hours after meals 22   Margarito Jimenez APRN   glucose blood (FREESTYLE LITE) test strip Use to check blood glucose before breakfast and two hours after meals 22   Margarito Jimenez APRN   Lancets (freestyle) lancets Use to check blood glucose before breakfast and two hours after meals 22   Margarito Jimenez APRN   ondansetron ODT (ZOFRAN-ODT) 4 MG disintegrating tablet Place 1  "tablet under the tongue 4 (Four) Times a Day As Needed for Nausea or Vomiting. 9/16/21   Shiela Park PA-C        Social History:   Social History     Tobacco Use   • Smoking status: Never   • Smokeless tobacco: Never   Substance Use Topics   • Alcohol use: Never   • Drug use: Never         Review of Systems:  Review of Systems   Constitutional: Negative for appetite change, chills, fatigue and fever.   HENT: Negative.    Eyes: Negative.    Respiratory: Negative for shortness of breath.    Cardiovascular: Negative for chest pain and leg swelling.   Gastrointestinal: Positive for nausea. Negative for abdominal pain, diarrhea and vomiting.   Endocrine: Negative.    Genitourinary: Positive for dysuria, frequency and urgency. Negative for decreased urine volume, difficulty urinating, flank pain, hematuria, pelvic pain, vaginal bleeding, vaginal discharge and vaginal pain.   Musculoskeletal: Negative for back pain and myalgias.   Skin: Negative for color change, rash and wound.   Allergic/Immunologic: Negative.    Neurological: Negative for dizziness, syncope, weakness and headaches.   Hematological: Negative.    Psychiatric/Behavioral: Negative.         Physical Exam:  /84   Pulse 114   Temp 98.6 °F (37 °C) (Oral)   Resp 20   Ht 149.9 cm (59\")   Wt 121 kg (265 lb 10.5 oz)   LMP 07/24/2022   SpO2 99%   BMI 53.66 kg/m²     Physical Exam  Constitutional:       General: She is not in acute distress.     Appearance: Normal appearance. She is not ill-appearing or toxic-appearing.   HENT:      Head: Normocephalic and atraumatic.      Mouth/Throat:      Mouth: Mucous membranes are moist.      Pharynx: Oropharynx is clear.   Eyes:      Extraocular Movements: Extraocular movements intact.      Pupils: Pupils are equal, round, and reactive to light.   Cardiovascular:      Pulses: Normal pulses.      Heart sounds: Normal heart sounds.   Pulmonary:      Effort: Pulmonary effort is normal. No respiratory distress. "      Breath sounds: Normal breath sounds.   Abdominal:      Palpations: Abdomen is soft.      Tenderness: There is no abdominal tenderness. There is no guarding.   Musculoskeletal:         General: Normal range of motion.      Cervical back: Neck supple.   Skin:     General: Skin is warm and dry.      Capillary Refill: Capillary refill takes less than 2 seconds.      Findings: No erythema or rash.   Neurological:      General: No focal deficit present.      Mental Status: She is alert and oriented to person, place, and time.   Psychiatric:         Mood and Affect: Mood normal.         Behavior: Behavior normal.                Medications in the Emergency Department:  Medications   sodium chloride 0.9 % flush 10 mL (has no administration in time range)   cefTRIAXone (ROCEPHIN) 250 mg/ml in lidocaine 1% IM syringe (500 mg vial) (has no administration in time range)        Labs  Lab Results (last 24 hours)     Procedure Component Value Units Date/Time    CBC & Differential [[id number]]  (Abnormal) Collected: 12/10/22 1949    Specimen: Blood from Arm, Left Updated: 12/10/22 1955    Narrative:      The following orders were created for panel order CBC & Differential.  Procedure                               Abnormality         Status                     ---------                               -----------         ------                     CBC Auto Differential[[id number]]        Abnormal            Final result                 Please view results for these tests on the individual orders.    Comprehensive Metabolic Panel [[id number]]  (Abnormal) Collected: 12/10/22 1949    Specimen: Blood from Arm, Left Updated: 12/10/22 2012     Glucose 144 mg/dL      BUN 10 mg/dL      Creatinine 0.49 mg/dL      Sodium 140 mmol/L      Potassium 3.6 mmol/L      Chloride 107 mmol/L      CO2 20.3 mmol/L      Calcium 9.3 mg/dL      Total Protein 6.8 g/dL      Albumin 3.80 g/dL      ALT (SGPT) 14 U/L      AST (SGOT) 14 U/L      Alkaline  Phosphatase 77 U/L      Total Bilirubin 0.2 mg/dL      Globulin 3.0 gm/dL      A/G Ratio 1.3 g/dL      BUN/Creatinine Ratio 20.4     Anion Gap 12.7 mmol/L      eGFR 127.0 mL/min/1.73      Comment: National Kidney Foundation and American Society of Nephrology (ASN) Task Force recommended calculation based on the Chronic Kidney Disease Epidemiology Collaboration (CKD-EPI) equation refit without adjustment for race.       Narrative:      GFR Normal >60  Chronic Kidney Disease <60  Kidney Failure <15      Lipase [[id number]]  (Normal) Collected: 12/10/22 1949    Specimen: Blood from Arm, Left Updated: 12/10/22 2012     Lipase 45 U/L     hCG, Quantitative, Pregnancy [[id number]] Collected: 12/10/22 1949    Specimen: Blood from Arm, Left Updated: 12/10/22 2032     HCG Quantitative 25,012.00 mIU/mL     Narrative:      HCG Ranges by Gestational Age    Females - non-pregnant premenopausal   </= 1mIU/mL HCG  Females - postmenopausal               </= 7mIU/mL HCG    3 Weeks       5.4   -      72 mIU/mL  4 Weeks      10.2   -     708 mIU/mL  5 Weeks       217   -   8,245 mIU/mL  6 Weeks       152   -  32,177 mIU/mL  7 Weeks     4,059   - 153,767 mIU/mL  8 Weeks    31,366   - 149,094 mIU/mL  9 Weeks    59,109   - 135,901 mIU/mL  10 Weeks   44,186   - 170,409 mIU/mL  12 Weeks   27,107   - 201,615 mIU/mL  14 Weeks   24,302   -  93,646 mIU/mL  15 Weeks   12,540   -  69,747 mIU/mL  16 Weeks    8,904   -  55,332 mIU/mL  17 Weeks    8,240   -  51,793 mIU/mL  18 Weeks    9,649   -  55,271 mIU/mL    Results may be falsely decreased if patient taking Biotin.      CBC Auto Differential [[id number]]  (Abnormal) Collected: 12/10/22 1949    Specimen: Blood from Arm, Left Updated: 12/10/22 1955     WBC 7.82 10*3/mm3      RBC 3.90 10*6/mm3      Hemoglobin 11.7 g/dL      Hematocrit 34.4 %      MCV 88.2 fL      MCH 30.0 pg      MCHC 34.0 g/dL      RDW 14.2 %      RDW-SD 45.1 fl      MPV 10.3 fL      Platelets 176 10*3/mm3      Neutrophil % 64.9 %       Lymphocyte % 24.6 %      Monocyte % 6.4 %      Eosinophil % 3.1 %      Basophil % 0.1 %      Immature Grans % 0.9 %      Neutrophils, Absolute 5.08 10*3/mm3      Lymphocytes, Absolute 1.92 10*3/mm3      Monocytes, Absolute 0.50 10*3/mm3      Eosinophils, Absolute 0.24 10*3/mm3      Basophils, Absolute 0.01 10*3/mm3      Immature Grans, Absolute 0.07 10*3/mm3      nRBC 0.0 /100 WBC     Urinalysis With Microscopic If Indicated (No Culture) - Urine, Clean Catch [[id number]]  (Abnormal) Collected: 12/10/22 2015    Specimen: Urine, Clean Catch Updated: 12/10/22 2047     Color, UA Yellow     Appearance, UA Cloudy     pH, UA 5.5     Specific Gravity, UA 1.027     Glucose, UA Negative     Ketones, UA Negative     Bilirubin, UA Negative     Blood, UA Negative     Protein, UA Trace     Leuk Esterase, UA Moderate (2+)     Nitrite, UA Negative     Urobilinogen, UA 1.0 E.U./dL    Urinalysis, Microscopic Only - Urine, Clean Catch [[id number]]  (Abnormal) Collected: 12/10/22 2015    Specimen: Urine, Clean Catch Updated: 12/10/22 2142     RBC, UA 3-5 /HPF      WBC, UA 13-20 /HPF      Bacteria, UA 2+ /HPF      Squamous Epithelial Cells, UA 21-30 /HPF      Hyaline Casts, UA None Seen /LPF      Calcium Oxalate Crystals, UA Large/3+ /HPF      Methodology Manual Light Microscopy           Imaging:  No Radiology Exams Resulted Within Past 24 Hours    Procedures:  Procedures    Progress  ED Course as of 12/10/22 2240   Sat Dec 10, 2022   1948 The patient was seen and examined by meJennifer APRN, while in triage. Orders placed. Patient is awaiting disposition.   [AR]      ED Course User Index  [AR] Jennifer Rajan APRN                            The patient was initially evaluated in the triage area where orders were placed. The patient was later dispositioned by FAHEEM Salas.      The patient was advised to stay for completion of workup which includes but is not limited to communication of labs and radiological  results, reassessment and plan. The patient was advised that leaving prior to disposition by a provider could result in critical findings that are not communicated to the patient.     Medical Decision Making:  MDM  Number of Diagnoses or Management Options  Acute cystitis during pregnancy in second trimester  Dysuria  Diagnosis management comments: Based on the results of the patient´s urinalysis and urinary complaints, signs, symptoms, and diagnostic testing is consistent with a urinary tract infection. Patient is resting comfortably, is alert, and is in no distress. The repeat examination is unremarkable and benign. The patient has no signs of urosepsis. The patient was started on antibiotics in the emergency department and will be discharged with antibiotics as an outpatient. The patient was counseled to return to the ER for fever >100.5, intractable pain or vomiting, or any other concerns that the may have. The patient has expressed a clear and thorough understanding and agreed to follow up as instructed.    We did check the fetal heart tones tonight.  Heart rate of 150s noted.    CBC and CMP were overall unremarkable.  No evidence of sepsis.  Patient is afebrile.  She is hemodynamically stable.  Feel she is safely be discharged home to follow-up as outpatient.  She was medicated with an injection of Rocephin while here in the ER for treatment of her urinary tract infection.  I am additionally sending the patient home on oral antibiotics to complete course of therapy.       Amount and/or Complexity of Data Reviewed  Clinical lab tests: ordered and reviewed  Tests in the medicine section of CPT®: ordered and reviewed  Review and summarize past medical records: yes  Independent visualization of images, tracings, or specimens: yes    Risk of Complications, Morbidity, and/or Mortality  Presenting problems: moderate  Diagnostic procedures: moderate  Management options: moderate    Patient Progress  Patient progress:  stable           The following orders were placed after triage and evaluation:  Orders Placed This Encounter   Procedures   • [place] Draw   • Comprehensive Metabolic Panel   • Lipase   • Urinalysis With Microscopic If Indicated (No Culture) - Urine, Clean Catch   • hCG, Quantitative, Pregnancy   • CBC Auto Differential   • Urinalysis, Microscopic Only - Urine, Clean Catch   • NPO Diet NPO Type: Strict NPO   • Undress & Gown   • Monitor fetal heart tones   • Insert Peripheral IV   • CBC & Differential   • Green Top (Gel)   • Lavender Top   • Gold Top - SST   • Light Blue Top       Final diagnoses:   Acute cystitis during pregnancy in second trimester   Dysuria          Disposition:  ED Disposition     ED Disposition   Discharge    Condition   Stable    Comment   --             This medical record created using voice recognition software.           Jennifer Rajan, APRN  12/10/22 0767

## 2022-12-12 ENCOUNTER — TELEPHONE (OUTPATIENT)
Dept: OBSTETRICS AND GYNECOLOGY | Facility: CLINIC | Age: 32
End: 2022-12-12

## 2022-12-12 RX ORDER — NITROFURANTOIN 25; 75 MG/1; MG/1
100 CAPSULE ORAL 2 TIMES DAILY
Qty: 14 CAPSULE | Refills: 0 | Status: SHIPPED | OUTPATIENT
Start: 2022-12-12 | End: 2022-12-19

## 2022-12-12 NOTE — TELEPHONE ENCOUNTER
----- Message from FAHEEM Edouard sent at 12/12/2022 12:38 PM EST -----  Please let patient know she has a UTI, will send antibiotic to her pharmacy.

## 2022-12-12 NOTE — TELEPHONE ENCOUNTER
I have called patient left detailed message regarding results and to contact the office if have any questions.

## 2022-12-16 ENCOUNTER — TELEPHONE (OUTPATIENT)
Dept: OBSTETRICS AND GYNECOLOGY | Facility: CLINIC | Age: 32
End: 2022-12-16

## 2022-12-16 NOTE — TELEPHONE ENCOUNTER
Please let patient know her NIPS screen is negative, fetus is male if she wants to know gender.   Please ask patient how her blood glucose levels have been.

## 2022-12-16 NOTE — TELEPHONE ENCOUNTER
Aware of all results blood glucose in morning staying 104-107 and anywhere after she eats 120. Advised to keep track still and to take log with her at her next apt.

## 2022-12-18 LAB — REF LAB TEST METHOD: NORMAL

## 2022-12-19 LAB
C TRACH RRNA CVX QL NAA+PROBE: NEGATIVE
CYTOLOGIST CVX/VAG CYTO: NORMAL
CYTOLOGY CVX/VAG DOC CYTO: NORMAL
CYTOLOGY CVX/VAG DOC THIN PREP: NORMAL
DX ICD CODE: NORMAL
HIV 1 & 2 AB SER-IMP: NORMAL
HPV GENOTYPE REFLEX: NORMAL
HPV I/H RISK 4 DNA CVX QL PROBE+SIG AMP: NEGATIVE
Lab: NORMAL
N GONORRHOEA RRNA CVX QL NAA+PROBE: NEGATIVE
OTHER STN SPEC: NORMAL
RECOM F/U CVX/VAG CYTO: NORMAL
STAT OF ADQ CVX/VAG CYTO-IMP: NORMAL
T VAGINALIS RRNA SPEC QL NAA+PROBE: NEGATIVE

## 2022-12-20 ENCOUNTER — TELEPHONE (OUTPATIENT)
Dept: OBSTETRICS AND GYNECOLOGY | Facility: CLINIC | Age: 32
End: 2022-12-20

## 2022-12-20 DIAGNOSIS — B00.9 HSV INFECTION: Primary | ICD-10-CM

## 2022-12-20 RX ORDER — VALACYCLOVIR HYDROCHLORIDE 1 G/1
1000 TABLET, FILM COATED ORAL 2 TIMES DAILY
Qty: 20 TABLET | Refills: 0 | Status: SHIPPED | OUTPATIENT
Start: 2022-12-20 | End: 2023-02-02

## 2022-12-20 NOTE — TELEPHONE ENCOUNTER
Called patient aware of results patient has not ever had an outbreak or been diagnosed in the past. Aware script at pharmacy.

## 2022-12-20 NOTE — TELEPHONE ENCOUNTER
----- Message from FAHEEM Edouard sent at 12/20/2022  1:44 PM EST -----  Please let patient know her pap came back negative cytology but pathology did see cell changes consistent with herpes simplex virus.  Please ask patient if she has ever had an outbreak or been diagnosed.  Would recommend a course of valtrex, can send to pharmacy.

## 2022-12-29 ENCOUNTER — HOSPITAL ENCOUNTER (OUTPATIENT)
Dept: ULTRASOUND IMAGING | Facility: HOSPITAL | Age: 32
Discharge: HOME | End: 2022-12-29
Admitting: NURSE PRACTITIONER

## 2022-12-29 DIAGNOSIS — Z34.80 SUPERVISION OF OTHER NORMAL PREGNANCY, ANTEPARTUM: ICD-10-CM

## 2022-12-29 PROCEDURE — 76811 OB US DETAILED SNGL FETUS: CPT

## 2023-01-03 ENCOUNTER — TELEPHONE (OUTPATIENT)
Dept: OBSTETRICS AND GYNECOLOGY | Facility: CLINIC | Age: 33
End: 2023-01-03
Payer: MEDICARE

## 2023-01-03 DIAGNOSIS — Z34.80 SUPERVISION OF OTHER NORMAL PREGNANCY, ANTEPARTUM: Primary | ICD-10-CM

## 2023-01-03 NOTE — TELEPHONE ENCOUNTER
Called and reviewed ultrasound findings with patient, discussed need for follow up for better visualization of heart and spine.  Verbalizes understanding.

## 2023-01-06 ENCOUNTER — PATIENT OUTREACH (OUTPATIENT)
Dept: LABOR AND DELIVERY | Facility: HOSPITAL | Age: 33
End: 2023-01-06
Payer: MEDICARE

## 2023-01-06 ENCOUNTER — ROUTINE PRENATAL (OUTPATIENT)
Dept: OBSTETRICS AND GYNECOLOGY | Facility: CLINIC | Age: 33
End: 2023-01-06
Payer: MEDICARE

## 2023-01-06 ENCOUNTER — REFERRAL TRIAGE (OUTPATIENT)
Dept: LABOR AND DELIVERY | Facility: HOSPITAL | Age: 33
End: 2023-01-06
Payer: MEDICARE

## 2023-01-06 VITALS — BODY MASS INDEX: 54.33 KG/M2 | DIASTOLIC BLOOD PRESSURE: 82 MMHG | WEIGHT: 269 LBS | SYSTOLIC BLOOD PRESSURE: 131 MMHG

## 2023-01-06 DIAGNOSIS — Z86.32 HISTORY OF GESTATIONAL DIABETES IN PRIOR PREGNANCY, CURRENTLY PREGNANT: ICD-10-CM

## 2023-01-06 DIAGNOSIS — O99.210 MATERNAL OBESITY AFFECTING PREGNANCY, ANTEPARTUM: ICD-10-CM

## 2023-01-06 DIAGNOSIS — O09.299 HISTORY OF GESTATIONAL DIABETES IN PRIOR PREGNANCY, CURRENTLY PREGNANT: ICD-10-CM

## 2023-01-06 DIAGNOSIS — B00.9 HSV INFECTION: ICD-10-CM

## 2023-01-06 DIAGNOSIS — Z34.80 SUPERVISION OF OTHER NORMAL PREGNANCY, ANTEPARTUM: Primary | ICD-10-CM

## 2023-01-06 DIAGNOSIS — Z87.59 HISTORY OF GESTATIONAL HYPERTENSION: ICD-10-CM

## 2023-01-06 PROBLEM — O16.2 ELEVATED BLOOD PRESSURE AFFECTING PREGNANCY IN SECOND TRIMESTER, ANTEPARTUM: Status: ACTIVE | Noted: 2023-01-06

## 2023-01-06 LAB
GLUCOSE UR STRIP-MCNC: NEGATIVE MG/DL
PROT UR STRIP-MCNC: NEGATIVE MG/DL

## 2023-01-06 PROCEDURE — 99214 OFFICE O/P EST MOD 30 MIN: CPT | Performed by: STUDENT IN AN ORGANIZED HEALTH CARE EDUCATION/TRAINING PROGRAM

## 2023-01-06 NOTE — OUTREACH NOTE
[facility]  Enrollment    Current Estimated Gestational Age: 23w5d    Questions/Answers    Flowsheet Row Responses   Would like to participate? Yes   Date of Intake Visit 01/06/23        Intake Assessment    Flowsheet Row Responses   Best Method for Contacting Cell   Currently Employed No   Able to keep appointments as scheduled Yes   Gender(s) and Name(s) Male - [place]   Resources Presently Utilizing: WIC (Women, Infant, Children), Other   Other Resources Utilizing: SNAP   Other Education Insurance benefits/Incentives, Transportation Assistance          Learning Assessment    Flowsheet Row Responses   Relationship Patient   Does the learner have any barriers to learning? No Barriers   What is the preferred language of the learner for medical teaching? English   Is an  required? No          Met with patient at [facility] at office visit. Lives locally. No transportation. Uses [employer]. Walks to most places, does have a friend that lives close that can give rides occasionally. [facility] works, patient is [employer]. Is involved with [employer] and other supportive organizations. Does have SNAP but states has times that food can get tight. Due to transportation, unable to get to places such as [employer] for food. Denies any needs for 1 year old at home. Was sent home with 24 hour urine, instructions given on how to collect and take to hospital. No other questions, needs or concerns. Contact information for myself given and encouraged to reach out for any future questions, needs or concerns.       Tobacco, Alcohol, and Drug History     reports that she has never smoked. She has never used smokeless tobacco.   reports no history of alcohol use.   reports no history of drug use.    Terese Solorio RN  Maternity Nurse Navigator    1/6/2023, 16:45 EST

## 2023-01-06 NOTE — PROGRESS NOTES
OB FOLLOW UP  Complaint   Chief Complaint   Patient presents with   • Routine Prenatal Visit            Rosie Bradley is a 32 y.o.  23w5d patient being seen today for her obstetrical follow up visit. Patient denies decreased fetal movement, contractions, loss of fluid or vaginal bleeding. Reports food scarcity. Has been checking blood sugars. Did not bring in logs today. Had U/S on , was told limited views. Compliant with baby ASA and PNV.     Her prenatal care is complicated by (and status) :    Patient Active Problem List   Diagnosis   • Supervision of other normal pregnancy, antepartum   • Previous  section   • Maternal obesity affecting pregnancy, antepartum   • History of gestational hypertension   • History of gestational diabetes in prior pregnancy, currently pregnant   • Previous  delivery, antepartum   • Rubella non-immune status, antepartum   • HSV infection   • Elevated blood pressure affecting pregnancy in second trimester, antepartum       All other systems reviewed and are negative.     The additional following portions of the patient's history were reviewed and updated as appropriate: allergies, current medications, past family history, past medical history, past social history, past surgical history and problem list.      EXAM:     Vital signs: /82   Wt 122 kg (269 lb)   LMP 2022   BMI 54.33 kg/m²   Appearance/psychiatric: To be in no distress  Constitutional: The patient is well nourished.  Cardiovascular: She does not have edema.  Respiratory: Respiratory effort is normal.  Gastrointestinal: Abdomen is soft, gravid, nontender, no rashes, heart tones are present, fundal height is unable to obtain 2/2 body habitus    Pelvic Exam: No    Urine glucose/protein: See prenatal flowsheet       Assessment and Plan    Problem List Items Addressed This Visit        Gravid and     History of gestational diabetes in prior pregnancy, currently pregnant     Overview     States had GDM with previous pregnancy and is considered pre-diabetic.  Will check A1C and start QID blood glucose checks.   22 A1C 5.1  2023 continue with FSBS as approaching 24 weeks          Relevant Medications    Blood Glucose Monitoring Suppl (FreeStyle Lite) w/Device kit    Lancets (freestyle) lancets    glucose blood (FREESTYLE LITE) test strip    History of gestational hypertension    Overview     Baseline labs - ALT/AST normal 22, P/C ratio 0.25, start ASA 81 mg         Relevant Medications    aspirin 81 MG EC tablet    Other Relevant Orders    Protein, Urine, 24 Hour - Urine, Clean Catch    Maternal obesity affecting pregnancy, antepartum    Overview     Start QID blood sugar checks, ASA 81 mg         Relevant Orders    Ambulatory Referral to MFM/Perinatology (Completed)    Supervision of other normal pregnancy, antepartum - Primary    Overview     DUDLEY finalized: 23 per LMP, anatomy scan ordered    Genetic testing (NIPS-Quad)/CF/AFP:  CF previously and negative.  NIPS negative, desires AFP but will need to wait until after ultrasound for insurance purposes    COVID: Fully vaccinated  Flu: Recommended 22  Tdap:    Rhogam:  ?Sterilization:    Anatomy US: 22 normal anatomy except limited views of heart and spine, follow up ordered. Posterior placenta.   FU US:    PROBLEM LIST/PLAN:   Hx of GDM/prediabetes - check A1C, start QID blood sugar checks  Hx of Gest. HTN - baseline labs (AST/ALT normal, P/C ratio 0.25), start ASA 81 mg daily  Previous  - vertical skin incision, will request op note  Maternal obesity - start ASA 81 mg   delivery - states delivered at 36w6d due to gestational hypertension  HSV changes noted on pap              Relevant Medications    Blood Glucose Monitoring Suppl (FreeStyle Lite) w/Device kit    Lancets (freestyle) lancets    glucose blood (FREESTYLE LITE) test strip    Other Relevant Orders    POC Urinalysis Dipstick  (Completed)       Other    HSV infection    Relevant Medications    valACYclovir (Valtrex) 1000 MG tablet       Impression  1. Pregnancy at 23w5d  2. Fetal status reassuring.   3. Activity and Exercise discussed.    Plan  1. Limited views on anatomy scan recommedation for level II U/S with MFM  2. Continue with FSBS four times a day  3. To meet with social work today for limited resources  4. 24 TUP for baseline labs given hx of GHTN and elevated BP noted at NOB, suspect underlying CHTN  5. Follow up 2 weeks       Patient was counseled to the following pregnancy precautions:  • Decreased fetal movement, if concern for decreased fetal movement please perform fetal kick counts you are looking for 10 movements in 2 hours.  If concern for fetal movement and not meeting that criteria, please present to triage for evaluation.  • Contractions occurring every 5 minutes for over an hour, lasting 30 to 60 seconds and progressively causing more discomfort, please seek medical attention to rule out labor  • If you believe that your water is broken, place a sanitary pad.  If pad fills in short period of time i.e. less than 5 minutes, take off pad placed another pad.  If this is saturated please present for rule out rupture of membranes  • Vaginal bleeding can be normal in pregnancy, this usually takes a form of spotting.  If having heavier bleeding like a menstrual period please present for evaluation; especially in light of severe abdominal pain this could represent a placental abruption.  • Keep all scheduled appointments as recommended.        Parth Power MD  01/06/2023

## 2023-01-09 ENCOUNTER — LAB (OUTPATIENT)
Dept: LAB | Facility: HOSPITAL | Age: 33
End: 2023-01-09
Payer: MEDICARE

## 2023-01-09 PROCEDURE — 84156 ASSAY OF PROTEIN URINE: CPT | Performed by: STUDENT IN AN ORGANIZED HEALTH CARE EDUCATION/TRAINING PROGRAM

## 2023-01-09 PROCEDURE — 81050 URINALYSIS VOLUME MEASURE: CPT | Performed by: STUDENT IN AN ORGANIZED HEALTH CARE EDUCATION/TRAINING PROGRAM

## 2023-01-10 LAB
COLLECT DURATION TIME UR: 24 HRS
PROT 24H UR-MRATE: 220.1 MG/24HOURS (ref 0–150)
SPECIMEN VOL 24H UR: 1350 ML

## 2023-01-19 ENCOUNTER — ROUTINE PRENATAL (OUTPATIENT)
Dept: OBSTETRICS AND GYNECOLOGY | Facility: CLINIC | Age: 33
End: 2023-01-19
Payer: MEDICARE

## 2023-01-19 VITALS — WEIGHT: 270 LBS | BODY MASS INDEX: 54.53 KG/M2 | SYSTOLIC BLOOD PRESSURE: 126 MMHG | DIASTOLIC BLOOD PRESSURE: 80 MMHG

## 2023-01-19 DIAGNOSIS — Z86.32 HISTORY OF GESTATIONAL DIABETES: ICD-10-CM

## 2023-01-19 DIAGNOSIS — Z34.80 SUPERVISION OF OTHER NORMAL PREGNANCY, ANTEPARTUM: Primary | ICD-10-CM

## 2023-01-19 LAB
DEPRECATED RDW RBC AUTO: 45.6 FL (ref 37–54)
ERYTHROCYTE [DISTWIDTH] IN BLOOD BY AUTOMATED COUNT: 13.6 % (ref 12.3–15.4)
GLUCOSE UR STRIP-MCNC: NEGATIVE MG/DL
HCT VFR BLD AUTO: 33.8 % (ref 34–46.6)
HGB BLD-MCNC: 11.4 G/DL (ref 12–15.9)
MCH RBC QN AUTO: 31 PG (ref 26.6–33)
MCHC RBC AUTO-ENTMCNC: 33.7 G/DL (ref 31.5–35.7)
MCV RBC AUTO: 91.8 FL (ref 79–97)
PLATELET # BLD AUTO: 186 10*3/MM3 (ref 140–450)
PMV BLD AUTO: 10.8 FL (ref 6–12)
PROT UR STRIP-MCNC: ABNORMAL MG/DL
RBC # BLD AUTO: 3.68 10*6/MM3 (ref 3.77–5.28)
WBC NRBC COR # BLD: 9.47 10*3/MM3 (ref 3.4–10.8)

## 2023-01-19 PROCEDURE — 83036 HEMOGLOBIN GLYCOSYLATED A1C: CPT | Performed by: OBSTETRICS & GYNECOLOGY

## 2023-01-19 PROCEDURE — 85027 COMPLETE CBC AUTOMATED: CPT | Performed by: OBSTETRICS & GYNECOLOGY

## 2023-01-19 PROCEDURE — 99213 OFFICE O/P EST LOW 20 MIN: CPT | Performed by: OBSTETRICS & GYNECOLOGY

## 2023-01-19 NOTE — PROGRESS NOTES
Chief Complaint:  Scheduled OB visit    HPI: 32 y.o.  at 25w4d   Positive baby movement  No blood sugar testing available today.  Cannot do Glucola today.  Spouse needs to go to work.  Check hemoglobin A1c today.    Vitals:    23 1010   BP: 126/80   Weight: 122 kg (270 lb)       See OB flowsheet also for pregnancy related data.    A/P  Intrauterine pregnancy at 25w4d   Diagnoses and all orders for this visit:    1. Supervision of other normal pregnancy, antepartum (Primary)  Overview:  DUDLEY finalized: 23 per LMP, anatomy scan ordered    Genetic testing (NIPS-Quad)/CF/AFP:  CF previously and negative.  NIPS negative, desires AFP but will need to wait until after ultrasound for insurance purposes    COVID: Fully vaccinated  Flu: Recommended 22  Tdap:    Rhogam:  ?Sterilization:    Anatomy US: 22 normal anatomy except limited views of heart and spine, follow up ordered. Posterior placenta.   FU US:    PROBLEM LIST/PLAN:   Hx of GDM/prediabetes - check A1C, start QID blood sugar checks  Hx of Gest. HTN - baseline labs (AST/ALT normal, P/C ratio 0.25), start ASA 81 mg daily  Previous  - vertical skin incision, will request op note  Maternal obesity - start ASA 81 mg   delivery - states delivered at 36w6d due to gestational hypertension  HSV changes noted on pap         Orders:  -     POC Urinalysis Dipstick  -     CBC (No Diff)    2. History of gestational diabetes  -     Hemoglobin A1c      Continue prenatal vitamins.  Keep MFM visit with ultrasound    PLAN:   Return in about 4 weeks (around 2023).    Edgardo Travis Sr., MD  2023 10:25 EST

## 2023-01-20 LAB — HBA1C MFR BLD: 4.8 % (ref 4.8–5.6)

## 2023-02-01 ENCOUNTER — PATIENT OUTREACH (OUTPATIENT)
Dept: LABOR AND DELIVERY | Facility: HOSPITAL | Age: 33
End: 2023-02-01
Payer: MEDICARE

## 2023-02-01 NOTE — OUTREACH NOTE
[facility]  Check-In    Current Estimated Gestational Age: 27w3d    Questions/Answers    Flowsheet Row Responses   Best Method for Contacting Cell          Spoke with patient via phone. Office staff wanting to check in with patient, left [facility] AMA due to multiple circumstances Monday. Had elevated B/P then. No future appointment scheduled with them. Appointment at OB office 2/15. States is doing well. Says they wanted to admit her in [place] but unable to stay due to  reasons. Has reached out to [facility] for resources. Is willing to come into OB office sooner than scheduled appointment.  contacted and message left. Will reach back out to patient with POC.      Terese Solorio RN  Maternity Nurse Navigator    2/1/2023, 16:03 EST

## 2023-02-02 ENCOUNTER — ROUTINE PRENATAL (OUTPATIENT)
Dept: OBSTETRICS AND GYNECOLOGY | Facility: CLINIC | Age: 33
End: 2023-02-02
Payer: MEDICARE

## 2023-02-02 ENCOUNTER — HOSPITAL ENCOUNTER (OUTPATIENT)
Facility: HOSPITAL | Age: 33
Discharge: HOME | End: 2023-02-02
Attending: OBSTETRICS & GYNECOLOGY | Admitting: STUDENT IN AN ORGANIZED HEALTH CARE EDUCATION/TRAINING PROGRAM
Payer: MEDICARE

## 2023-02-02 VITALS — RESPIRATION RATE: 18 BRPM | DIASTOLIC BLOOD PRESSURE: 70 MMHG | SYSTOLIC BLOOD PRESSURE: 150 MMHG | HEART RATE: 90 BPM

## 2023-02-02 VITALS — SYSTOLIC BLOOD PRESSURE: 150 MMHG | WEIGHT: 272 LBS | BODY MASS INDEX: 54.94 KG/M2 | DIASTOLIC BLOOD PRESSURE: 90 MMHG

## 2023-02-02 DIAGNOSIS — Z87.59 HISTORY OF GESTATIONAL HYPERTENSION: ICD-10-CM

## 2023-02-02 DIAGNOSIS — O09.299 HISTORY OF GESTATIONAL DIABETES IN PRIOR PREGNANCY, CURRENTLY PREGNANT: ICD-10-CM

## 2023-02-02 DIAGNOSIS — Z86.32 HISTORY OF GESTATIONAL DIABETES IN PRIOR PREGNANCY, CURRENTLY PREGNANT: ICD-10-CM

## 2023-02-02 DIAGNOSIS — Z34.80 SUPERVISION OF OTHER NORMAL PREGNANCY, ANTEPARTUM: Primary | ICD-10-CM

## 2023-02-02 DIAGNOSIS — Z13.1 ENCOUNTER FOR SCREENING FOR DIABETES MELLITUS: ICD-10-CM

## 2023-02-02 PROBLEM — O09.219 PREVIOUS PRETERM DELIVERY, ANTEPARTUM: Status: RESOLVED | Noted: 2022-12-08 | Resolved: 2023-02-02

## 2023-02-02 PROBLEM — Z30.09 UNWANTED FERTILITY: Status: ACTIVE | Noted: 2023-02-02

## 2023-02-02 LAB
ALBUMIN SERPL-MCNC: 3.8 G/DL (ref 3.5–5.2)
ALBUMIN/GLOB SERPL: 1.2 G/DL
ALP SERPL-CCNC: 108 U/L (ref 39–117)
ALT SERPL W P-5'-P-CCNC: 7 U/L (ref 1–33)
ANION GAP SERPL CALCULATED.3IONS-SCNC: 10.5 MMOL/L (ref 5–15)
AST SERPL-CCNC: 13 U/L (ref 1–32)
BILIRUB SERPL-MCNC: 0.2 MG/DL (ref 0–1.2)
BUN SERPL-MCNC: 4 MG/DL (ref 6–20)
BUN/CREAT SERPL: 9.1 (ref 7–25)
CALCIUM SPEC-SCNC: 8.8 MG/DL (ref 8.6–10.5)
CHLORIDE SERPL-SCNC: 104 MMOL/L (ref 98–107)
CO2 SERPL-SCNC: 21.5 MMOL/L (ref 22–29)
CREAT SERPL-MCNC: 0.44 MG/DL (ref 0.57–1)
CREAT UR-MCNC: 209.8 MG/DL
DEPRECATED RDW RBC AUTO: 44.8 FL (ref 37–54)
EGFRCR SERPLBLD CKD-EPI 2021: 130.4 ML/MIN/1.73
ERYTHROCYTE [DISTWIDTH] IN BLOOD BY AUTOMATED COUNT: 13.9 % (ref 12.3–15.4)
GLOBULIN UR ELPH-MCNC: 3.2 GM/DL
GLUCOSE 1H P GLC SERPL-MCNC: 148 MG/DL (ref 65–139)
GLUCOSE P FAST SERPL-MCNC: 147 MG/DL (ref 65–94)
GLUCOSE SERPL-MCNC: 155 MG/DL (ref 65–99)
GLUCOSE UR STRIP-MCNC: NEGATIVE MG/DL
HCT VFR BLD AUTO: 32.2 % (ref 34–46.6)
HGB BLD-MCNC: 10.9 G/DL (ref 12–15.9)
MCH RBC QN AUTO: 30.4 PG (ref 26.6–33)
MCHC RBC AUTO-ENTMCNC: 33.9 G/DL (ref 31.5–35.7)
MCV RBC AUTO: 89.9 FL (ref 79–97)
PLATELET # BLD AUTO: 167 10*3/MM3 (ref 140–450)
PMV BLD AUTO: 10.7 FL (ref 6–12)
POTASSIUM SERPL-SCNC: 3.1 MMOL/L (ref 3.5–5.2)
PROT ?TM UR-MCNC: 80.9 MG/DL
PROT SERPL-MCNC: 7 G/DL (ref 6–8.5)
PROT UR STRIP-MCNC: ABNORMAL MG/DL
PROT/CREAT UR: 0.39 MG/G{CREAT}
RBC # BLD AUTO: 3.58 10*6/MM3 (ref 3.77–5.28)
SODIUM SERPL-SCNC: 136 MMOL/L (ref 136–145)
WBC NRBC COR # BLD: 7.94 10*3/MM3 (ref 3.4–10.8)

## 2023-02-02 PROCEDURE — 82947 ASSAY GLUCOSE BLOOD QUANT: CPT | Performed by: STUDENT IN AN ORGANIZED HEALTH CARE EDUCATION/TRAINING PROGRAM

## 2023-02-02 PROCEDURE — 80053 COMPREHEN METABOLIC PANEL: CPT | Performed by: STUDENT IN AN ORGANIZED HEALTH CARE EDUCATION/TRAINING PROGRAM

## 2023-02-02 PROCEDURE — 82570 ASSAY OF URINE CREATININE: CPT | Performed by: STUDENT IN AN ORGANIZED HEALTH CARE EDUCATION/TRAINING PROGRAM

## 2023-02-02 PROCEDURE — G0463 HOSPITAL OUTPT CLINIC VISIT: HCPCS

## 2023-02-02 PROCEDURE — 84156 ASSAY OF PROTEIN URINE: CPT | Performed by: STUDENT IN AN ORGANIZED HEALTH CARE EDUCATION/TRAINING PROGRAM

## 2023-02-02 PROCEDURE — 85027 COMPLETE CBC AUTOMATED: CPT | Performed by: STUDENT IN AN ORGANIZED HEALTH CARE EDUCATION/TRAINING PROGRAM

## 2023-02-02 PROCEDURE — 0502F SUBSEQUENT PRENATAL CARE: CPT | Performed by: STUDENT IN AN ORGANIZED HEALTH CARE EDUCATION/TRAINING PROGRAM

## 2023-02-02 PROCEDURE — 81050 URINALYSIS VOLUME MEASURE: CPT

## 2023-02-02 PROCEDURE — 82950 GLUCOSE TEST: CPT | Performed by: STUDENT IN AN ORGANIZED HEALTH CARE EDUCATION/TRAINING PROGRAM

## 2023-02-02 NOTE — PROGRESS NOTES
OB FOLLOW UP  Complaint   Chief Complaint   Patient presents with   • Routine Prenatal Visit            Rosie Bradley is a 32 y.o.  27w4d patient being seen today for her obstetrical follow up visit. Patient denies decreased fetal movement, contractions, loss of fluid or vaginal bleeding.  Patient reports persistent headache on the right temporal area unrelieved with Tylenol.  Patient was seen by maternal-fetal medicine specialist earlier this week for level 2 ultrasound given limited findings on ultrasound at [place].  At that appointment patient was having elevated pressures and was offered admission to the hospital for preeclampsia/blood pressure monitoring.  Patient left office AMA secondary to social concerns about transportation.  She reports consistent headache still ongoing.  Legal issues have been resolved.  She does not desire future pregnancies.    Her prenatal care is complicated by (and status) :    Patient Active Problem List   Diagnosis   • Supervision of other normal pregnancy, antepartum   • Previous  section   • Maternal obesity affecting pregnancy, antepartum   • History of gestational hypertension   • History of gestational diabetes in prior pregnancy, currently pregnant   • Rubella non-immune status, antepartum   • HSV infection   • Elevated blood pressure affecting pregnancy in second trimester, antepartum   • Unwanted fertility       All other systems reviewed and are negative.     The additional following portions of the patient's history were reviewed and updated as appropriate: allergies, current medications, past family history, past medical history, past social history, past surgical history and problem list.      EXAM:     Vital signs: /90   Wt 123 kg (272 lb)   LMP 2022   BMI 54.94 kg/m²   Appearance/psychiatric: To be in no distress  Constitutional: The patient is well nourished.  Cardiovascular: She does not have edema.  Respiratory: Respiratory  effort is normal.  Gastrointestinal: Abdomen is soft, gravid, nontender, no rashes, heart tones are present, fundal height is Unable to be obtained secondary to body habitus    Pelvic Exam: No    Urine glucose/protein: See prenatal flowsheet       Assessment and Plan    Problem List Items Addressed This Visit        Gravid and     History of gestational diabetes in prior pregnancy, currently pregnant    Overview     States had GDM with previous pregnancy and is considered pre-diabetic.  Will check A1C and start QID blood glucose checks.   22 A1C 5.1  2023 continue with FSBS as approaching 24 weeks   2023 1 hour glucose tolerance testing         Relevant Medications    Blood Glucose Monitoring Suppl (FreeStyle Lite) w/Device kit    Lancets (freestyle) lancets    glucose blood (FREESTYLE LITE) test strip    History of gestational hypertension    Overview     Baseline labs - ALT/AST normal 22, P/C ratio 0.25, start ASA 81 mg  2023 patient to labor and delivery for elevated blood pressures and persistent headache         Relevant Medications    aspirin 81 MG EC tablet    Supervision of other normal pregnancy, antepartum - Primary    Overview     DUDLEY finalized: 23 per LMP, anatomy scan ordered    Genetic testing (NIPS-Quad)/CF/AFP:  CF previously and negative.  NIPS negative, desires AFP but will need to wait until after ultrasound for insurance purposes    COVID: Fully vaccinated  Flu: Recommended 22  Tdap:    Rhogam:  ?Sterilization:    Anatomy US: 22 normal anatomy except limited views of heart and spine, follow up ordered. Posterior placenta.   FU US:    PROBLEM LIST/PLAN:   Hx of GDM/prediabetes - check A1C, start QID blood sugar checks  Hx of Gest. HTN - baseline labs (AST/ALT normal, P/C ratio 0.25), start ASA 81 mg daily  Previous  - vertical skin incision, will request op note  Maternal obesity - start ASA 81 mg   delivery - states delivered at 36w6d  due to gestational hypertension  HSV changes noted on pap    The following general data was obtained:    Presentation - Transverse, head to maternal right    Estimated Fetal Weight - 1145 grams, Growth    percentile is 65    Placental location - Anterior fundal, Grade 2    Amniotic fluid volume -  13.14    Largest fluid pocket is 5.15         Anatomical survey: There are no apparent anatomical    malformations. However, the survey is incomplete.    Follow up was not scheduled; patient left AMA. See    consult note.                 Relevant Medications    Blood Glucose Monitoring Suppl (FreeStyle Lite) w/Device kit    Lancets (freestyle) lancets    glucose blood (FREESTYLE LITE) test strip    Other Relevant Orders    POC Urinalysis Dipstick (Completed)    CBC (No Diff)    Gestational Diabetes Screen 1 Hour   Other Visit Diagnoses     Encounter for screening for diabetes mellitus        Relevant Orders    Gestational Diabetes Screen 1 Hour          Impression  1. Pregnancy at 27w4d  2. Fetal status reassuring.   3. Activity and Exercise discussed.    Plan  1.  1 hour glucose tolerance testing today for second trimester screening.  2.  Patient to labor and delivery for elevated blood pressures and persistent headache with trace protein in urine for concerns for preeclampsia.  Labor and delivery staff notified as well as OB hospitalist.  3.  Sterilization paperwork to be signed today  4.  Follow-up 2 weeks  5.  Preeclampsia warning signs discussed      Patient was counseled to the following pregnancy precautions:  • Decreased fetal movement, if concern for decreased fetal movement please perform fetal kick counts you are looking for 10 movements in 2 hours.  If concern for fetal movement and not meeting that criteria, please present to triage for evaluation.  • Contractions occurring every 5 minutes for over an hour, lasting 30 to 60 seconds and progressively causing more discomfort, please seek medical attention to  rule out labor  • If you believe that your water is broken, place a sanitary pad.  If pad fills in short period of time i.e. less than 5 minutes, take off pad placed another pad.  If this is saturated please present for rule out rupture of membranes  • Vaginal bleeding can be normal in pregnancy, this usually takes a form of spotting.  If having heavier bleeding like a menstrual period please present for evaluation; especially in light of severe abdominal pain this could represent a placental abruption.  • Keep all scheduled appointments as recommended.        Parth Power MD  02/02/2023

## 2023-02-02 NOTE — NURSING NOTE
Dr Sierra at nurse's station. Notified of patient arrival to unit.  at 27.4 weeks gestation. Sent from office from Dr. Power for elevated blood pressure in office. Patient blood pressure in office was 150/90. Today in triage, blood pressure 146/91. Labs drawn and sent, awaiting to be resulted. Patient difficult to monitor due to maternal position and gestational age. BPP ordered by Dr. Sierra. Patient was at [facility] on Monday, [facility] wanted patient to stay overnight and patient left AMA due to childcare reasons. Dr Sierra to bedside to evaluate patient.

## 2023-02-02 NOTE — NURSING NOTE
Dr Sierra states since we are able to continuously monitor, cancel BPP, await results and call Dr. Sierra when labs resulted.

## 2023-02-02 NOTE — NURSING NOTE
Dr Sierra at nurse's station. States he has talked to Dr. Estrada and send patient home with 24 hour urine. Patient will bring back tomorrow at 1630. Discharge orders received.

## 2023-02-02 NOTE — NON STRESS TEST
Obstetrical Non-stress Test Interpretation     Name:  Rosie Bradley  MRN: [id number]    32 y.o. female  at 27w4d    Indication: elevated blood pressure, sent from office       Fetal Assessment  Fetal Movement: active  Fetal HR Assessment Method: external  Fetal HR (beats/min): 150  Fetal HR Baseline: normal range  Fetal HR Variability: moderate (amplitude range 6 to 25 bpm)    Patient Vitals for the past 24 hrs:   BP Pulse Resp   23 1732 150/70 90 --   23 1720 149/97 95 18   23 1710 157/96 98 18   23 1707 154/87 94 18   23 1647 146/91 93 18       Reason for test: OB Triage (elevated blood pressure)  Date of Test: 2023  Time frame of test: 4566-6979  RN NST Interpretation:  (appropriate for gestational age)      Latricia Ross RN  2023  18:08 EST

## 2023-02-02 NOTE — H&P
[facility]   HISTORY AND PHYSICAL    Patient Name: Rosie Bradley  : 1990  MRN: [id number]  Primary Care Physician:  Provider, No Known  Date of admission: 2023    Subjective   Subjective     Chief Complaint: Sent from office due to elevated blood pressure.    History of Present Illness:  Ms. Bradley is a 32-year-old  2 para 1 at 27 weeks gestation.  She was sent from the office due to elevated blood pressures.  She reported a headache in the office.  However, she specifically denied any headache or visual disturbances when asked here on labor and delivery.  She also denied any chest pain or difficulty breathing.  She reports good fetal movement.  She denies any vaginal bleeding, leaking fluid per vagina, or abnormal vaginal discharge.  She denies any leg pain.  She states that she had some elevated blood pressure problems with her previous pregnancy.  She has also had some elevated pressures previously during this pregnancy.  She is unaware if she has elevated blood pressure outside of pregnancy.  She states that she has never been diagnosed with chronic hypertension but states that she does not see a doctor unless she is pregnant typically.    Review of Systems: A comprehensive review of systems was peformed and was negative except as noted in the chief complaint and history of present illness.    Past medical history: I did review her past medical history as noted below.  Obesity.  She denies any other chronic illnesses or conditions.    Past surgical history: I did review her past surgical history as noted below.  Previous  delivery.  Multiple oral surgeries.  No other abdominal or pelvic surgery.    I did review her past social and family history as noted below.  I did review her allergies and medications as noted below.    Past obstetrical history: She is a  2 para 1-0-0-1.  Her previous pregnancy was complicated by preeclampsia.  She was delivered via .  Her current  pregnancy is complicated by previous  delivery, category 3 obesity, and elevated blood pressure.    Personal History     Past Medical History:   Diagnosis Date   • Gestational diabetes    • Pre-diabetes     boarderline diabetes   • Previous  delivery, antepartum 2022    Delivered at 36w6d due to gestational hypertension       Past Surgical History:   Procedure Laterality Date   •  SECTION     • MANDIBLE FRACTURE SURGERY     • TONSILLECTOMY     • WISDOM TOOTH EXTRACTION         Family History: family history includes Diabetes in her mother; Hypertension in her sister. Otherwise pertinent FHx was reviewed and not pertinent to current issue.    Social History:  reports that she has never smoked. She has never used smokeless tobacco. She reports that she does not drink alcohol and does not use drugs.    Home Medications:  FreeStyle Lite, aspirin, freestyle, and glucose blood    Allergies:  No Known Allergies    Objective    Objective     Vitals:   Heart Rate:  [93] 93  Resp:  [18] 18  BP: (146-150)/(90-91) 146/91    Physical Exam: Her head is normocephalic and atraumatic.  Her eyes have normal alignment and normal extraocular movements.  She has normal-appearing nose and mouth.  Her neck is normal to visual inspection.  Her breathing is non-labored.  She has no wheezing, stridor, or cough.  Her abdomen is obese, gravid, soft, nontender, and she has no peritoneal signs.  She has mild swelling in her lower extremities.  No calf tenderness bilaterally and Tadeo's sign is absent bilaterally.  No ankle clonus noted.  She is alert, oriented, and in no acute distress.  Her speech appears normal.  Her thought process appears normal.  Her mood and affect appear normal.    Result Review    Result Review:  I have personally reviewed the results from the time of this admission to 2023 17:14 EST and agree with these findings:  [x]  Laboratory list / accordion  []  Microbiology  []  Radiology  []   EKG/Telemetry   []  Cardiology/Vascular   []  Pathology  []  Old records  [x]  Other: Fetal cardiotocographic monitoring.  Most notable findings include: Fetal heart reate interpretation was performed.  The baseline fetal heart rate was within the normal range.  There was adequate variability for gestational age.  Some accelerations were noted.  There were no repetitive late fetal heart rate decelerations and no recurrent severe variable fetal heart rate decelerations.  No sustained fetal tachycardia, bradycardia, or sinusoidal pattern noted.  Overall impression:  Reassuring - no overt evidence of fetal compromise.  Review of her urine protein creatinine ratio, complete blood count, and comprehensive metabolic profile support the diagnosis of preeclampsia without severe features.    Assessment & Plan   Assessment / Plan     Brief Patient Summary:  Rosie Bradley is a 32 y.o. female     - Elevated blood pressure: She reported headache in the office but now denies any headache.  She denies any visual disturbances.  She denies any chest pain or difficulty breathing.  Her blood pressure is mildly elevated.  At the time of this dictation, she does not meet criteria for preeclampsia with severe features based on symptomatology.  We will check a complete blood count, comprehensive metabolic profile, and urine protein creatinine ratio.  If she does meet criteria for preeclampsia with severe features based upon her laboratory evaluation she will be managed accordingly.  If her lab work returns normal results then I anticipate she will likely be discharged home later today.    - Fetal surveillance of IUP @ 27 weeks: I personally reviewed her fetal heart rate tracing which shows a normal baseline, good variability for gestational age, some accelerations, and no evidence of fetal compromise by fetal heart rate decelerations or periodic changes.  Overall, the fetal heart rate tracing is reassuring for gestational  age.    - ADDENDUM:  I have reviewed her blood pressures, her labs, and her fetal heart rate tracing.  At the time of this dictation she appears to meet criteria for preeclampsia without severe features.  I spoke with Dr. Estrada, on-call for Dr. Castillo's group, and updated him on today's findings.  We agreed upon the plan to discharge her home with instructions to collect a 24-hour urine protein, follow-up in the office as previously scheduled, and to return or call for any problems such as headache, visual disturbances, chest pain, difficulty breathing, epigastric or right upper quadrant pain, any severe abdominal pain, vaginal bleeding, or decreased fetal movement.    Active Hospital Problems:  There are no active hospital problems to display for this patient.    Plan:       DVT prophylaxis:  No DVT prophylaxis order currently exists.  Plan mechanical DVT prophylaxis postpartum.    CODE STATUS:       Admission Status:  I believe this patient meets discharge status.    Mitesh Sierra MD

## 2023-02-02 NOTE — NURSING NOTE
Dr Williamson called and notified of labs resulted. Blood pressures read to Dr. Sierra. Dr Sierra states he will look at lab results and talk to patient about plan of care.

## 2023-02-06 ENCOUNTER — TELEPHONE (OUTPATIENT)
Dept: OBSTETRICS AND GYNECOLOGY | Facility: CLINIC | Age: 33
End: 2023-02-06
Payer: MEDICARE

## 2023-02-06 ENCOUNTER — LAB (OUTPATIENT)
Dept: LAB | Facility: HOSPITAL | Age: 33
End: 2023-02-06
Payer: MEDICARE

## 2023-02-06 DIAGNOSIS — Z87.59 HISTORY OF GESTATIONAL HYPERTENSION: Primary | ICD-10-CM

## 2023-02-06 NOTE — TELEPHONE ENCOUNTER
Ryann with [facility] called stating pt dropped off a 24 hr urine on Friday.  ordered, but there is not an order put in. Can you sign? I have attached an order.

## 2023-02-07 LAB
COLLECT DURATION TIME UR: 24 HRS
PROT 24H UR-MRATE: 171.2 MG/24HOURS (ref 0–150)
SPECIMEN VOL 24H UR: 800 ML

## 2023-02-13 ENCOUNTER — TELEPHONE (OUTPATIENT)
Dept: OBSTETRICS AND GYNECOLOGY | Facility: CLINIC | Age: 33
End: 2023-02-13
Payer: MEDICARE

## 2023-02-19 PROBLEM — O09.93 HIGH-RISK PREGNANCY IN THIRD TRIMESTER: Status: ACTIVE | Noted: 2022-12-08

## 2023-02-19 PROBLEM — O14.00 ANTEPARTUM MILD PREECLAMPSIA: Status: ACTIVE | Noted: 2023-02-19

## 2023-02-19 PROBLEM — O16.2 ELEVATED BLOOD PRESSURE AFFECTING PREGNANCY IN SECOND TRIMESTER, ANTEPARTUM: Status: RESOLVED | Noted: 2023-01-06 | Resolved: 2023-02-19

## 2023-02-22 NOTE — PROGRESS NOTES
"OB FOLLOW UP      Chief Complaint   Patient presents with   • Routine Prenatal Visit     Subjective:   • No complaints, however on exam patient has noted scratches on her abdomen and she does note that she has been scratching all over for the last couple weeks.  • No HA, vision changes, RUQ pain or UE edema   • Has not made follow-up with MFM    Objective:  /92   Wt 125 kg (274 lb 9.6 oz)   LMP 07/24/2022   BMI 55.46 kg/m²  7.53 kg (16 lb 9.6 oz)  Uterine Size: size greater than dates, pannus  FHT: see OB flow, unable to obtain heart rate with Doppler, bedside ultrasound required.      See OB flow for edema, cvx exam if performed, and Upro/Uglu    Assessment and Plan:  31w1d   Reassuring pregnancy progress.  Questions answered.  Diagnoses and all orders for this visit:    1. High-risk pregnancy in third trimester (Primary)  Overview:  DUDLEY finalized: 4/30/23 per LMP, and by 21-week anatomy scan     CF previously negative.    NIPS negative XY    COVID: Fully vaccinated and s/p bivalent booster  Flu: Vaccinated  Tdap:  Recommended  2/27/2023 Rx    ?Sterilization: Tubal consents signed 2/2/2023    Anatomy US: 12/29/22 normal anatomy except limited views of heart and spine, follow up ordered. Posterior placenta.   FU US: 1/30 MFM Birch's transverse, 2 pounds 8 ounces EFW 65%.  Anterior placenta    PROBLEM LIST/PLAN:   Preeclampsia- admitted [facility] 2/2-urine PC ratio 0.36, mildly elevated blood pressures   biweekly nonstress test q Mon Thu   ultrasounds for growth- 2/27/2023 recommend FU MFM, message cristofer to assist w next US, pt left last MFM OV before FU could be scheduled   weekly office visits   Continue daily low-dose aspirin   check blood pressures at home.  2/27/2023 Rx given for BP cuff.  Return to labor and delivery for any systolics 155 or more diastolics 95 or more, or any HTN symptoms  Hx of GDM/prediabetes - A1C 1/19- 4.8, started QID blood sugar checks   No BS log today.  F \"all elevated more than " "95, mostly 105\", 2hr PP \"usually normal\".  Recommend pt call w levels and may need tx and mgmt in [place].  Previous  -    vertical skin incision, op note shows high transverse  via midline vertical skin incision  Maternal obesity, pregravid BMI 52-continue ASA 81 mg   delivery - states delivered at 36w6d due to gestational hypertension  Pap smear with cellular changes consistent with HSV-option of serum levels and/or Valtrex prophylaxis  Rubella nonimmune-vaccinate postpartum    Orders:  -     POC Urinalysis Dipstick  -     Blood Pressure Device  -     Fetal Nonstress Test; Standing    2. Itching  -     Bile Acids, Total  -     Comprehensive Metabolic Panel    Counseling:    • OB precautions, leaking, VB, ilan reynolds vs PTL/Labor  • FKC  • HTN precautions, HA, vision change, RUQ/epigastric pain, edema  • GESTATIONAL DIABETES discussed.  Importance of strict BS control (goals <95F & <120 2hr PP).  Record and bring all BS to qOV (provided log).  Risks of inadequate control NLT macrosomia, shoulder dystocia +/- perm nn damage/fractures/O2 deprivation/brain damage, maternal lacs/dyspareunia, incontinence, prolapse, CS, hemorrhage/transfusion, childhood obesity/DM.  Appropriate diet choices, consistency with eating/sleep schedules, & exercising recommended.  Lifetime risk DM and need for PP and yearly glucose checks.  Call [facility] and ask for office clinical nurse/manager if >25% of BS out of normal range before next OV to discuss treatment.  • We discussed in detail the diagnosis of preeclampsia and importance of modified light activity at home.  Follow-up immediately for any hypertension symptoms or persistently elevated blood pressures.  • Motherhood connection contacted to assist with social issues  • Continue PNV.  Importance of healthy eating.    Return in about 1 week (around 3/6/2023) for FU OB q1wks to 36weeks.  Schedule NSTs biweekly & give NST info sheet.    I spent 40 minutes caring " orestes Velez on this date of service. This time includes time spent by me in the following activities:reviewing tests, obtaining and/or reviewing a separately obtained history, performing a medically appropriate examination and/or evaluation , counseling and educating the patient/family/caregiver, ordering medications, tests, or procedures, referring and communicating with other health care professionals , documenting information in the medical record and care coordination        Rosalee Avendano DO  02/27/2023    [facility]  [facility] [place] [address]  Dept: [phone]  Dept Fax: [phone]  Loc: [phone]  Loc Fax: [phone]

## 2023-02-27 ENCOUNTER — TELEPHONE (OUTPATIENT)
Dept: OBSTETRICS AND GYNECOLOGY | Facility: CLINIC | Age: 33
End: 2023-02-27
Payer: MEDICARE

## 2023-02-27 ENCOUNTER — ROUTINE PRENATAL (OUTPATIENT)
Dept: OBSTETRICS AND GYNECOLOGY | Facility: CLINIC | Age: 33
End: 2023-02-27
Payer: MEDICARE

## 2023-02-27 VITALS — DIASTOLIC BLOOD PRESSURE: 92 MMHG | BODY MASS INDEX: 55.46 KG/M2 | WEIGHT: 274.6 LBS | SYSTOLIC BLOOD PRESSURE: 139 MMHG

## 2023-02-27 DIAGNOSIS — L29.9 ITCHING: ICD-10-CM

## 2023-02-27 DIAGNOSIS — O09.93 HIGH-RISK PREGNANCY IN THIRD TRIMESTER: Primary | ICD-10-CM

## 2023-02-27 LAB
BILIRUB BLD-MCNC: NEGATIVE MG/DL
BLOOD, POC: ABNORMAL
GLUCOSE UR STRIP-MCNC: NEGATIVE MG/DL
KETONES UR QL: NEGATIVE
LEUKOCYTE EST, POC: ABNORMAL
NITRITE UR-MCNC: NEGATIVE MG/ML
PH UR: 5 [PH] (ref 4–9)
PROT UR STRIP-MCNC: ABNORMAL MG/DL
SP GR UR: 1.03 (ref 1–1.03)
UROBILINOGEN UR QL: NORMAL

## 2023-02-27 PROCEDURE — 82239 BILE ACIDS TOTAL: CPT | Performed by: OBSTETRICS & GYNECOLOGY

## 2023-02-27 PROCEDURE — 99215 OFFICE O/P EST HI 40 MIN: CPT | Performed by: OBSTETRICS & GYNECOLOGY

## 2023-02-27 PROCEDURE — 80053 COMPREHEN METABOLIC PANEL: CPT | Performed by: OBSTETRICS & GYNECOLOGY

## 2023-02-28 LAB
ALBUMIN SERPL-MCNC: 3.9 G/DL (ref 3.5–5.2)
ALBUMIN/GLOB SERPL: 0.9 G/DL
ALP SERPL-CCNC: 116 U/L (ref 39–117)
ALT SERPL W P-5'-P-CCNC: 9 U/L (ref 1–33)
ANION GAP SERPL CALCULATED.3IONS-SCNC: 11 MMOL/L (ref 5–15)
AST SERPL-CCNC: 15 U/L (ref 1–32)
BILIRUB SERPL-MCNC: 0.3 MG/DL (ref 0–1.2)
BUN SERPL-MCNC: 10 MG/DL (ref 6–20)
BUN/CREAT SERPL: 17.9 (ref 7–25)
CALCIUM SPEC-SCNC: 10 MG/DL (ref 8.6–10.5)
CHLORIDE SERPL-SCNC: 103 MMOL/L (ref 98–107)
CO2 SERPL-SCNC: 26 MMOL/L (ref 22–29)
CREAT SERPL-MCNC: 0.56 MG/DL (ref 0.57–1)
EGFRCR SERPLBLD CKD-EPI 2021: 123 ML/MIN/1.73
GLOBULIN UR ELPH-MCNC: 4.2 GM/DL
GLUCOSE SERPL-MCNC: 69 MG/DL (ref 65–99)
POTASSIUM SERPL-SCNC: 4.2 MMOL/L (ref 3.5–5.2)
PROT SERPL-MCNC: 8.1 G/DL (ref 6–8.5)
SODIUM SERPL-SCNC: 140 MMOL/L (ref 136–145)

## 2023-03-01 ENCOUNTER — TELEPHONE (OUTPATIENT)
Dept: OBSTETRICS AND GYNECOLOGY | Facility: CLINIC | Age: 33
End: 2023-03-01
Payer: MEDICARE

## 2023-03-01 ENCOUNTER — HOSPITAL ENCOUNTER (OUTPATIENT)
Facility: HOSPITAL | Age: 33
Discharge: HOME | End: 2023-03-01
Attending: OBSTETRICS & GYNECOLOGY | Admitting: OBSTETRICS & GYNECOLOGY
Payer: MEDICARE

## 2023-03-01 VITALS — HEART RATE: 96 BPM | SYSTOLIC BLOOD PRESSURE: 136 MMHG | RESPIRATION RATE: 22 BRPM | DIASTOLIC BLOOD PRESSURE: 71 MMHG

## 2023-03-01 LAB — BILE AC SERPL-SCNC: 5.3 UMOL/L (ref 0–10)

## 2023-03-01 PROCEDURE — 59025 FETAL NON-STRESS TEST: CPT

## 2023-03-01 NOTE — TELEPHONE ENCOUNTER
Request was sent to [facility] for the patient to have a follow up ultrasound in 1 week.  We received notification back today stating they offered the patient earlier appointments but the first one the patient could take was 3/20/23.

## 2023-03-01 NOTE — NON STRESS TEST
Obstetrical Non-stress Test Interpretation     Name:  Rosie Bradley  MRN: [id number]    32 y.o. female  at 31w3d    Indication: elevated blood pressure       Fetal Assessment  Fetal Movement: active  Fetal HR Assessment Method: external  Fetal HR (beats/min): 140  Fetal HR Baseline: normal range  Fetal HR Variability: moderate (amplitude range 6 to 25 bpm)  Fetal HR Accelerations: greater than/equal to 10 bpm (32 wks gest or less), lasts at least 10 seconds (32 wks gest or less)  Fetal HR Decelerations: absent  Sinusoidal Pattern Present: absent    /71 (BP Location: Right arm, Patient Position: Sitting)   Pulse 96   Resp 22   LMP 2022     Reason for test: Hypertension  Date of Test: 3/1/2023  Time frame of test: 7389-1872  RN NST Interpretation: Reactive      Latricia Ross RN  3/1/2023  11:50 EST

## 2023-03-01 NOTE — TELEPHONE ENCOUNTER
LVM trying to reach the patient regarding appointment change in our office. - Dr. Avendano has advised that patient should not need to wait until 3/20/23 for follow up ultrasound  and ok w/ patient having u/s here.  The patient stated she could come to our office on 3/8/23 (this was the first day the patient stated she could make an appointment   Ultrasound has been scheduled @ [facility] office for 3/8/23 @ 11:00 am and to see Dr. Power after that appointment.

## 2023-03-03 DIAGNOSIS — O14.00 ANTEPARTUM MILD PREECLAMPSIA: Primary | ICD-10-CM

## 2023-03-03 DIAGNOSIS — O09.93 HIGH-RISK PREGNANCY IN THIRD TRIMESTER: ICD-10-CM

## 2023-03-03 DIAGNOSIS — O99.210 MATERNAL OBESITY AFFECTING PREGNANCY, ANTEPARTUM: ICD-10-CM

## 2023-03-06 ENCOUNTER — HOSPITAL ENCOUNTER (OUTPATIENT)
Facility: HOSPITAL | Age: 33
Discharge: HOME | End: 2023-03-06
Attending: OBSTETRICS & GYNECOLOGY | Admitting: STUDENT IN AN ORGANIZED HEALTH CARE EDUCATION/TRAINING PROGRAM
Payer: MEDICARE

## 2023-03-06 ENCOUNTER — HOSPITAL ENCOUNTER (OUTPATIENT)
Dept: LABOR AND DELIVERY | Facility: HOSPITAL | Age: 33
Discharge: HOME | End: 2023-03-06
Payer: MEDICARE

## 2023-03-06 VITALS — DIASTOLIC BLOOD PRESSURE: 73 MMHG | RESPIRATION RATE: 22 BRPM | SYSTOLIC BLOOD PRESSURE: 126 MMHG | HEART RATE: 88 BPM

## 2023-03-06 LAB
ALBUMIN SERPL-MCNC: 4 G/DL (ref 3.5–5.2)
ALBUMIN/GLOB SERPL: 1.1 G/DL
ALP SERPL-CCNC: 120 U/L (ref 39–117)
ALT SERPL W P-5'-P-CCNC: 8 U/L (ref 1–33)
ANION GAP SERPL CALCULATED.3IONS-SCNC: 9.1 MMOL/L (ref 5–15)
AST SERPL-CCNC: 12 U/L (ref 1–32)
BASOPHILS # BLD AUTO: 0.03 10*3/MM3 (ref 0–0.2)
BASOPHILS NFR BLD AUTO: 0.2 % (ref 0–1.5)
BILIRUB SERPL-MCNC: 0.2 MG/DL (ref 0–1.2)
BUN SERPL-MCNC: 9 MG/DL (ref 6–20)
BUN/CREAT SERPL: 18 (ref 7–25)
CALCIUM SPEC-SCNC: 9.3 MG/DL (ref 8.6–10.5)
CHLORIDE SERPL-SCNC: 102 MMOL/L (ref 98–107)
CO2 SERPL-SCNC: 25.9 MMOL/L (ref 22–29)
CREAT SERPL-MCNC: 0.5 MG/DL (ref 0.57–1)
CREAT UR-MCNC: 134.1 MG/DL
DEPRECATED RDW RBC AUTO: 49.1 FL (ref 37–54)
EGFRCR SERPLBLD CKD-EPI 2021: 126.4 ML/MIN/1.73
EOSINOPHIL # BLD AUTO: 0.17 10*3/MM3 (ref 0–0.4)
EOSINOPHIL NFR BLD AUTO: 1.4 % (ref 0.3–6.2)
ERYTHROCYTE [DISTWIDTH] IN BLOOD BY AUTOMATED COUNT: 14.8 % (ref 12.3–15.4)
GLOBULIN UR ELPH-MCNC: 3.5 GM/DL
GLUCOSE SERPL-MCNC: 123 MG/DL (ref 65–99)
HCT VFR BLD AUTO: 38.1 % (ref 34–46.6)
HGB BLD-MCNC: 12.5 G/DL (ref 12–15.9)
IMM GRANULOCYTES # BLD AUTO: 0.19 10*3/MM3 (ref 0–0.05)
IMM GRANULOCYTES NFR BLD AUTO: 1.6 % (ref 0–0.5)
LYMPHOCYTES # BLD AUTO: 1.54 10*3/MM3 (ref 0.7–3.1)
LYMPHOCYTES NFR BLD AUTO: 12.7 % (ref 19.6–45.3)
MCH RBC QN AUTO: 30.3 PG (ref 26.6–33)
MCHC RBC AUTO-ENTMCNC: 32.8 G/DL (ref 31.5–35.7)
MCV RBC AUTO: 92.5 FL (ref 79–97)
MONOCYTES # BLD AUTO: 0.68 10*3/MM3 (ref 0.1–0.9)
MONOCYTES NFR BLD AUTO: 5.6 % (ref 5–12)
NEUTROPHILS NFR BLD AUTO: 78.5 % (ref 42.7–76)
NEUTROPHILS NFR BLD AUTO: 9.52 10*3/MM3 (ref 1.7–7)
NRBC BLD AUTO-RTO: 0 /100 WBC (ref 0–0.2)
PLATELET # BLD AUTO: 188 10*3/MM3 (ref 140–450)
PMV BLD AUTO: 10.3 FL (ref 6–12)
POTASSIUM SERPL-SCNC: 3.7 MMOL/L (ref 3.5–5.2)
PROT ?TM UR-MCNC: 35.5 MG/DL
PROT SERPL-MCNC: 7.5 G/DL (ref 6–8.5)
PROT/CREAT UR: 0.26 MG/G{CREAT}
RBC # BLD AUTO: 4.12 10*6/MM3 (ref 3.77–5.28)
SODIUM SERPL-SCNC: 137 MMOL/L (ref 136–145)
WBC NRBC COR # BLD: 12.13 10*3/MM3 (ref 3.4–10.8)

## 2023-03-06 PROCEDURE — 84156 ASSAY OF PROTEIN URINE: CPT | Performed by: STUDENT IN AN ORGANIZED HEALTH CARE EDUCATION/TRAINING PROGRAM

## 2023-03-06 PROCEDURE — 80053 COMPREHEN METABOLIC PANEL: CPT | Performed by: STUDENT IN AN ORGANIZED HEALTH CARE EDUCATION/TRAINING PROGRAM

## 2023-03-06 PROCEDURE — 82570 ASSAY OF URINE CREATININE: CPT | Performed by: STUDENT IN AN ORGANIZED HEALTH CARE EDUCATION/TRAINING PROGRAM

## 2023-03-06 PROCEDURE — 85025 COMPLETE CBC W/AUTO DIFF WBC: CPT | Performed by: STUDENT IN AN ORGANIZED HEALTH CARE EDUCATION/TRAINING PROGRAM

## 2023-03-06 PROCEDURE — 59025 FETAL NON-STRESS TEST: CPT

## 2023-03-06 PROCEDURE — 59025 FETAL NON-STRESS TEST: CPT | Performed by: STUDENT IN AN ORGANIZED HEALTH CARE EDUCATION/TRAINING PROGRAM

## 2023-03-06 NOTE — NON STRESS TEST
Obstetrical Non-stress Test Interpretation     Name:  Rosie Bradley  MRN: [id number]    32 y.o. female  at 32w1d    Indication: hypertension      Fetal Assessment  Fetal Movement: active  Fetal HR Assessment Method: external  Fetal HR (beats/min): 140  Fetal HR Baseline: normal range  Fetal HR Variability: moderate (amplitude range 6 to 25 bpm)  Fetal HR Accelerations: greater than/equal to 15 bpm, lasting at least 15 seconds  Fetal HR Decelerations: absent  Sinusoidal Pattern Present: absent    Patient Vitals for the past 24 hrs:   BP Pulse Resp   23 1011 126/73 88 --   23 1001 129/72 95 --   23 0951 128/66 89 --   23 0941 142/76 90 --   23 0931 142/80 98 --   23 0921 140/72 91 --   23 0911 144/74 92 --   23 0910 144/74 92 --   23 0821 140/78 93 --   23 0811 142/80 104 --   23 0802 146/77 90 --   23 0801 146/77 90 22     Reason for test: Hypertension  Date of Test: 3/6/2023  Time frame of test: 3632-6395  RN NST Interpretation: Minerva Durant RN  3/6/2023  10:28 EST

## 2023-03-08 ENCOUNTER — ROUTINE PRENATAL (OUTPATIENT)
Dept: OBSTETRICS AND GYNECOLOGY | Facility: CLINIC | Age: 33
End: 2023-03-08
Payer: MEDICARE

## 2023-03-08 ENCOUNTER — PATIENT OUTREACH (OUTPATIENT)
Dept: LABOR AND DELIVERY | Facility: HOSPITAL | Age: 33
End: 2023-03-08
Payer: MEDICARE

## 2023-03-08 ENCOUNTER — HOSPITAL ENCOUNTER (OUTPATIENT)
Facility: HOSPITAL | Age: 33
Discharge: HOME | End: 2023-03-08
Attending: OBSTETRICS & GYNECOLOGY | Admitting: OBSTETRICS & GYNECOLOGY
Payer: MEDICARE

## 2023-03-08 VITALS — SYSTOLIC BLOOD PRESSURE: 155 MMHG | DIASTOLIC BLOOD PRESSURE: 99 MMHG | WEIGHT: 271 LBS | BODY MASS INDEX: 54.74 KG/M2

## 2023-03-08 VITALS
WEIGHT: 271 LBS | HEART RATE: 117 BPM | RESPIRATION RATE: 18 BRPM | HEIGHT: 59 IN | BODY MASS INDEX: 54.63 KG/M2 | DIASTOLIC BLOOD PRESSURE: 69 MMHG | SYSTOLIC BLOOD PRESSURE: 137 MMHG

## 2023-03-08 DIAGNOSIS — O09.93 HIGH-RISK PREGNANCY IN THIRD TRIMESTER: Primary | ICD-10-CM

## 2023-03-08 DIAGNOSIS — Z30.09 UNWANTED FERTILITY: ICD-10-CM

## 2023-03-08 LAB
ALBUMIN SERPL-MCNC: 3.9 G/DL (ref 3.5–5.2)
ALBUMIN/GLOB SERPL: 1.2 G/DL
ALP SERPL-CCNC: 128 U/L (ref 39–117)
ALT SERPL W P-5'-P-CCNC: 9 U/L (ref 1–33)
ANION GAP SERPL CALCULATED.3IONS-SCNC: 12.6 MMOL/L (ref 5–15)
AST SERPL-CCNC: 13 U/L (ref 1–32)
BILIRUB SERPL-MCNC: 0.2 MG/DL (ref 0–1.2)
BUN SERPL-MCNC: 6 MG/DL (ref 6–20)
BUN/CREAT SERPL: 12.2 (ref 7–25)
CALCIUM SPEC-SCNC: 9.4 MG/DL (ref 8.6–10.5)
CHLORIDE SERPL-SCNC: 99 MMOL/L (ref 98–107)
CO2 SERPL-SCNC: 19.4 MMOL/L (ref 22–29)
CREAT SERPL-MCNC: 0.49 MG/DL (ref 0.57–1)
DEPRECATED RDW RBC AUTO: 46.8 FL (ref 37–54)
EGFRCR SERPLBLD CKD-EPI 2021: 127 ML/MIN/1.73
ERYTHROCYTE [DISTWIDTH] IN BLOOD BY AUTOMATED COUNT: 14.4 % (ref 12.3–15.4)
GLOBULIN UR ELPH-MCNC: 3.3 GM/DL
GLUCOSE SERPL-MCNC: 141 MG/DL (ref 65–99)
GLUCOSE UR STRIP-MCNC: ABNORMAL MG/DL
HCT VFR BLD AUTO: 34.9 % (ref 34–46.6)
HGB BLD-MCNC: 12 G/DL (ref 12–15.9)
MCH RBC QN AUTO: 30.6 PG (ref 26.6–33)
MCHC RBC AUTO-ENTMCNC: 34.4 G/DL (ref 31.5–35.7)
MCV RBC AUTO: 89 FL (ref 79–97)
PLATELET # BLD AUTO: 191 10*3/MM3 (ref 140–450)
PMV BLD AUTO: 10.1 FL (ref 6–12)
POTASSIUM SERPL-SCNC: 3.5 MMOL/L (ref 3.5–5.2)
PROT SERPL-MCNC: 7.2 G/DL (ref 6–8.5)
PROT UR STRIP-MCNC: NEGATIVE MG/DL
RBC # BLD AUTO: 3.92 10*6/MM3 (ref 3.77–5.28)
SODIUM SERPL-SCNC: 131 MMOL/L (ref 136–145)
WBC NRBC COR # BLD: 10.67 10*3/MM3 (ref 3.4–10.8)

## 2023-03-08 PROCEDURE — G0463 HOSPITAL OUTPT CLINIC VISIT: HCPCS

## 2023-03-08 PROCEDURE — 59025 FETAL NON-STRESS TEST: CPT

## 2023-03-08 PROCEDURE — 0502F SUBSEQUENT PRENATAL CARE: CPT | Performed by: STUDENT IN AN ORGANIZED HEALTH CARE EDUCATION/TRAINING PROGRAM

## 2023-03-08 PROCEDURE — 80053 COMPREHEN METABOLIC PANEL: CPT | Performed by: OBSTETRICS & GYNECOLOGY

## 2023-03-08 PROCEDURE — 85027 COMPLETE CBC AUTOMATED: CPT | Performed by: OBSTETRICS & GYNECOLOGY

## 2023-03-08 NOTE — H&P
EDEL Corrales  Obstetric History and Physical    Chief Complaint   Patient presents with   • Non-stress Test     Sent from office       Subjective     HPI:    Patient is a 32 y.o. female  currently at 32w3d, who presents with elevated BP in office;  No HA, vision changes;  Good FM.    Her prenatal care is complicated by  sexually transmitted disease  genital herpes  no current active lesion or prodromal symptoms are preseent, hypertension  pre-eclampsia, diabetes  pre-gestational and prior   desires repeat .  Her previous obstetric/gynecological history is noted for is remarkable for prior HTCS    The following portions of the patients history were reviewed and updated as appropriate:   current medications, allergies, past medical history, past surgical history, past family history, past social history and current problem list.     Prenatal Information:  Prenatal Results     POC Urine Glucose/Protein     Test Value Reference Range Date Time    Urine Glucose  1+ mg/dL Negative 23 1155    Urine Protein  Negative mg/dL Negative 23 1155          Initial Prenatal Labs     Test Value Reference Range Date Time    Hemoglobin  11.7 g/dL 12.0 - 15.9 12/10/22 1949       11.7 g/dL 12.0 - 15.9 22 1038    Hematocrit  34.4 % 34.0 - 46.6 12/10/22 1949       35.8 % 34.0 - 46.6 22 1038    Platelets  176 10*3/mm3 140 - 450 12/10/22 1949       166 10*3/mm3 140 - 450 22 1038    Rubella IgG  <0.90 index Immune >0.99 22 1038    Hepatitis B SAg  Non-Reactive  Non-Reactive 22 1038    Hepatitis C Ab  Non-Reactive  Non-Reactive 22 1038    RPR  Non-Reactive  Non-Reactive 22 1038    T. Pallidum Ab         ABO  O   22 1038    Rh  Positive   22 1038    Antibody Screen  Negative   22 1038    HIV  Non-Reactive  Non-Reactive 22 1038    Urine Culture  >100,000 CFU/mL Mixed Neda Isolated   22 1029    Gonorrhea  Negative  Negative 22 1029     Chlamydia  Negative  Negative 12/08/22 1029    TSH        HgB A1c   4.80 % 4.80 - 5.60 01/19/23 1035       5.10 % 4.80 - 5.60 12/08/22 1038          2nd and 3rd Trimester     Test Value Reference Range Date Time    Hemoglobin (repeated)  12.0 g/dL 12.0 - 15.9 03/08/23 1302       12.5 g/dL 12.0 - 15.9 03/06/23 0844       10.9 g/dL 12.0 - 15.9 02/02/23 1634       11.4 g/dL 12.0 - 15.9 01/19/23 1035    Hematocrit (repeated)  34.9 % 34.0 - 46.6 03/08/23 1302       38.1 % 34.0 - 46.6 03/06/23 0844       32.2 % 34.0 - 46.6 02/02/23 1634       33.8 % 34.0 - 46.6 01/19/23 1035    Platelets   191 10*3/mm3 140 - 450 03/08/23 1302       188 10*3/mm3 140 - 450 03/06/23 0844       167 10*3/mm3 140 - 450 02/02/23 1634       186 10*3/mm3 140 - 450 01/19/23 1035       176 10*3/mm3 140 - 450 12/10/22 1949       166 10*3/mm3 140 - 450 12/08/22 1038    GCT  148 mg/dL 65 - 139 02/02/23 1634    Antibody Screen (repeated)        GTT Fasting  147 mg/dL 65 - 94 02/02/23 1634    GTT 1 Hr        GTT 2 Hr        GTT 3 Hr        Group B Strep              Drug Screening     Test Value Reference Range Date Time    Amphetamine Screen        Barbiturate Screen        Benzodiazepine Screen        Methadone Screen        Phencyclidine Screen        Opiates Screen        THC Screen        Cocaine Screen        Propoxyphene Screen        Buprenorphine Screen        Methamphetamine Screen        Oxycodone Screen        Tricyclic Antidepressants Screen              Other (Risk screening)     Test Value Reference Range Date Time    Varicella IgG        Parvovirus IgG        CMV IgG        Cystic Fibrosis        Hemoglobin electrophoresis        NIPT        MSAFP-4        AFP (for NTD only)              Legend    ^: Historical                      External Prenatal Results     Pregnancy Outside Results - Transcribed From Office Records - See Scanned Records For Details     Test Value Date Time    ABO  O  12/08/22 1038    Rh  Positive  12/08/22 1038     Antibody Screen  Negative  22 1038    Varicella IgG       Rubella  <0.90 index 22 1038    Hgb  12.0 g/dL 23 1302       12.5 g/dL 23 0844       10.9 g/dL 23 1634       11.4 g/dL 23 1035       11.7 g/dL 12/10/22 1949       11.7 g/dL 22 1038    Hct  34.9 % 23 1302       38.1 % 23 0844       32.2 % 23 1634       33.8 % 23 1035       34.4 % 12/10/22 1949       35.8 % 22 1038    Glucose Fasting GTT  147 mg/dL 23 1634    Glucose Tolerance Test 1 hour       Glucose Tolerance Test 3 hour       Gonorrhea (discrete)  Negative  22 1029    Chlamydia (discrete)  Negative  22 1029    RPR  Non-Reactive  22 1038    VDRL       Syphilis Antibody       HBsAg  Non-Reactive  22 1038    Herpes Simplex Virus PCR       Herpes Simplex VIrus Culture       HIV  Non-Reactive  22 1038    Hep C RNA Quant PCR       Hep C Antibody  Non-Reactive  22 1038    AFP       Group B Strep       GBS Susceptibility to Clindamycin       GBS Susceptibility to Erythromycin       Fetal Fibronectin       Genetic Testing, Maternal Blood             Drug Screening     Test Value Date Time    Urine Drug Screen       Amphetamine Screen       Barbiturate Screen       Benzodiazepine Screen       Methadone Screen       Phencyclidine Screen       Opiates Screen       THC Screen       Cocaine Screen       Propoxyphene Screen       Buprenorphine Screen       Methamphetamine Screen       Oxycodone Screen       Tricyclic Antidepressants Screen             Legend    ^: Historical                         Past OB History:     OB History    Para Term  AB Living   2 1 0 1 0 1   SAB IAB Ectopic Molar Multiple Live Births   0 0 0 0 0 1      # Outcome Date GA Lbr Ricky/2nd Weight Sex Delivery Anes PTL Lv   2 Current            1  19 36w6d  2750 g (6 lb 1 oz) M [id number]-[id number]  N TEENA      Complications: Hypertension, Gestational diabetes       Past  Medical History: Past Medical History:   Diagnosis Date   • Gestational diabetes    • Pre-diabetes     boarderline diabetes   • Previous  delivery, antepartum 2022    Delivered at 36w6d due to gestational hypertension      Past Surgical History Past Surgical History:   Procedure Laterality Date   •  SECTION     • MANDIBLE FRACTURE SURGERY     • TONSILLECTOMY     • WISDOM TOOTH EXTRACTION        Family History: Family History   Problem Relation Age of Onset   • Diabetes Mother    • Hypertension Sister       Social History:  reports that she has never smoked. She has never used smokeless tobacco.   reports no history of alcohol use.   reports no history of drug use.        General ROS: Pertinent items are noted in HPI  Home Medications:  FreeStyle Lite, aspirin, freestyle, and glucose blood    Allergies:  No Known Allergies    Objective       Vital Signs Range for the last 24 hours  Temperature:     Temp Source:     BP: BP: (140-168)/(79-99) 140/82   Pulse: Heart Rate:  [132] 132   Respirations: Resp:  [18] 18   SPO2:       Physical Examination:   General appearance - alert, well appearing, and in no distress  Mental status - alert, oriented to person, place, and time  Abdomen - soft, nontender, nondistended,   Pelvic - normal external genitalia, vulva, vagina, cervix, and uterus   Back exam - full range of motion, no tenderness or pain on motion  Neurological - alert, oriented, normal speech  Extremities - peripheral pulses normal  Skin - normal coloration and turgor, no suspicious skin lesions noted    Presentation: unknown   Cervix:     Dilation:     Effacement:     Station:         Fetal Heart Rate Assessment :  130s R, no decels, good celina;  Cat 1  Method:     Beats/min:     Baseline:     Variability:     Accels:     Decels:     Tracing Category:       Uterine Assessment :  quiet  Method:     Frequency (min):     Ctx Count in 10 min:     Duration:     Intensity:     [place] Units:       GBS  is unknown     Assessment & Plan       * No active hospital problems. *        Assessment:  1.  Intrauterine pregnancy at 32w3d gestation with reactive fetal status.    2.  pre-eclampsia mild  3.  Obstetrical history significant for is non-contributory.  4.  GBS status: No results found for: STREPGPB    Plan:  1. Discharge to home.   as Bps good and labs wnl other than bs - encouraged to check and report BS;  Keep NST tomorrow  2.  Plan of care has been reviewed with patient and patient agrees.   3.  Risks, benefits of treatment plan have been discussed.  4.  All questions have been answered.        Electronically signed by Miranda Jeffries MD, 03/08/23, 1:42 PM EST.

## 2023-03-08 NOTE — OUTREACH NOTE
[facility]  Check-In    Current Estimated Gestational Age: 32w3d    Questions/Answers    Flowsheet Row Responses   Demographics Reviewed Yes   Currently Employed No   Able to keep appointments as scheduled Yes   Baby Active/Feeling Fetal Movemen Yes   How are you presently feeling? OK   New Diagnosis Pre-eclampsia   Equipment presently used at home: Blood Pressure Cuff  [Needs one ordered]   Resource/Environmental Concerns Reliable Transportation   Do you have any questions related to your care experience, your pregnancy, plans for delivery, any concerns, etc? Yes          Spoke with Dr Power after appointment. Concerns about transportation, has not gotten blood pressure cuff, and needing breast pump. Spoke with patient. At office with significant other. States has been checking pharmacy for blood pressure cuff but they say that they do not have anything ready for her. States did order breast pump from [employer]. Questioned about if she is able to get to labor and delivery today and states yes. Verifies has appointment with [facility] for transportation to next appointment. Office staff made aware of need for breast pump rx and blood pressure cuff.     Terese Schneider RN  Maternity Nurse Navigator    3/8/2023, 14:28 EST

## 2023-03-08 NOTE — NON STRESS TEST
"Obstetrical Non-stress Test Interpretation     Name:  Rosie Bradley  MRN: [id number]    32 y.o. female  at 32w3d    Indication: HTN      Fetal Assessment  Fetal Movement: active  Fetal HR Assessment Method: external  Fetal HR (beats/min): 140  Fetal HR Baseline: normal range  Fetal HR Variability: moderate (amplitude range 6 to 25 bpm)  Fetal HR Accelerations: greater than/equal to 15 bpm, lasting at least 15 seconds  Fetal HR Decelerations: absent  Sinusoidal Pattern Present: absent    Patient Vitals for the past 24 hrs:   BP Pulse Resp Height Weight   23 1330 137/69 117 -- -- --   23 1320 134/66 119 -- -- --   23 1310 125/68 (!) 125 -- -- --   23 1301 -- -- -- 149.9 cm (59\") 123 kg (271 lb)   23 1300 140/82 (!) 132 18 -- --       Reason for test: Hypertension  Date of Test: 3/8/2023  Time frame of test: 6530-4084  RN NST Interpretation: Reactive      Josefina Marvin RN  3/8/2023  14:21 EST  "

## 2023-03-08 NOTE — PROGRESS NOTES
OB FOLLOW UP  Complaint   Chief Complaint   Patient presents with   • Routine Prenatal Visit            Rosie Bradley is a 32 y.o.  32w3d patient being seen today for her obstetrical follow up visit. Patient denies decreased fetal movement, contractions, loss of fluid or vaginal bleeding.  Patient denies any headache, visual disturbances, new onset nausea vomiting, right upper quadrant pain, or new onset swelling.  Has been checking fingerstick blood sugars at home.  Does not have a blood pressure cuff to monitor her blood pressures.  Has been compliant with twice weekly nonstress test.       Her prenatal care is complicated by (and status) :    Patient Active Problem List   Diagnosis   • High-risk pregnancy in third trimester   • Previous  section   • Maternal obesity affecting pregnancy, antepartum   • History of gestational hypertension   • History of gestational diabetes in prior pregnancy, currently pregnant   • Rubella non-immune status, antepartum   • HSV infection   • Unwanted fertility   • Preeclampsia   • Itching       All other systems reviewed and are negative.     The additional following portions of the patient's history were reviewed and updated as appropriate: allergies, current medications, past family history, past medical history, past social history, past surgical history and problem list.      EXAM:     Vital signs: /99   Wt 123 kg (271 lb)   LMP 2022   BMI 54.74 kg/m²   Appearance/psychiatric: To be in no distress  Constitutional: The patient is well nourished.  Cardiovascular: She does not have edema.  Respiratory: Respiratory effort is normal.  Gastrointestinal: Abdomen is soft, gravid, nontender, no rashes, heart tones are present, fundal height is unable to be obtained secondary to body habitus    Pelvic Exam: No    Urine glucose/protein: See prenatal flowsheet       Assessment and Plan    Problem List Items Addressed This Visit        Genitourinary and  "Reproductive     Unwanted fertility    Overview     2023 tubal paperwork to be signed today.            Gravid and     High-risk pregnancy in third trimester - Primary    Overview     DUDLEY finalized: 23 per LMP, and by 21-week anatomy scan     CF previously negative.    NIPS negative XY    COVID: Fully vaccinated and s/p bivalent booster  Flu: Vaccinated  Tdap:  Recommended  2023 Rx    ?Sterilization: Tubal consents signed 2023    Anatomy US: 22 normal anatomy except limited views of heart and spine, follow up ordered. Posterior placenta.   FU US:  MFM Birch's transverse, 2 pounds 8 ounces EFW 65%.  Anterior placenta    PROBLEM LIST/PLAN:   Preeclampsia- admitted [facility] -urine PC ratio 0.36, mildly elevated blood pressures   biweekly nonstress test q Mon Thu   ultrasounds for growth- 2023 recommend FU MFM, message [employer] to assist w next US, pt left last MFM OV before FU could be scheduled   weekly office visits   Continue daily low-dose aspirin   check blood pressures at home.  2023 Rx given for BP cuff.  Return to labor and delivery for any systolics 155 or more diastolics 95 or more, or any HTN symptoms  Hx of GDM/prediabetes - A1C - 4.8, started QID blood sugar checks   No BS log today.  F \"all elevated more than 95, mostly 105\", 2hr PP \"usually normal\".  Recommend pt call w levels and may need tx and mgmt in [place].  Previous  -    vertical skin incision, op note shows high transverse  via midline vertical skin incision  Maternal obesity, pregravid BMI 52-continue ASA 81 mg   delivery - states delivered at 36w6d due to gestational hypertension  Pap smear with cellular changes consistent with HSV-option of serum levels and/or Valtrex prophylaxis  Rubella nonimmune-vaccinate postpartum         Relevant Medications    Blood Glucose Monitoring Suppl (FreeStyle Lite) w/Device kit    Lancets (freestyle) lancets    glucose blood (FREESTYLE " LITE) test strip    Other Relevant Orders    POC Urinalysis Dipstick (Completed)       Impression  1. Pregnancy at 32w3d  2. Fetal status reassuring.   3. Activity and Exercise discussed.    Plan  1.  Previous operative report demonstrating a high transverse  via midline vertical skin incision.  Recommendation for delivery between 36 and 37 weeks with high transverse uterine incision  2.  Patient with severe range blood pressure followed by mild range.  No protein in urine.  Patient to labor and delivery for preeclampsia labs for stability.  Recommendation for patient be started on antihypertensive medication.  Continue with baby aspirin.  Preeclampsia warning signs discussed  3.  Review of fingerstick blood sugars with elevations with fasting, breakfast and lunch.  Patient has MFM referral in place to be performed on 3/23/2023.  1+ glucose in urine today.  Ultrasound demonstrating appropriate fetal growth in the 31st percentile with AC in the 55th percentile.  Breech presentation.  Reassuring fetal growth.  Limited heart views.  4.  Continue with twice weekly nonstress test and follow-up 1 week in office.      Patient was counseled to the following pregnancy precautions:  • Decreased fetal movement, if concern for decreased fetal movement please perform fetal kick counts you are looking for 10 movements in 2 hours.  If concern for fetal movement and not meeting that criteria, please present to triage for evaluation.  • Contractions occurring every 5 minutes for over an hour, lasting 30 to 60 seconds and progressively causing more discomfort, please seek medical attention to rule out labor  • If you believe that your water is broken, place a sanitary pad.  If pad fills in short period of time i.e. less than 5 minutes, take off pad placed another pad.  If this is saturated please present for rule out rupture of membranes  • Vaginal bleeding can be normal in pregnancy, this usually takes a form of spotting.   If having heavier bleeding like a menstrual period please present for evaluation; especially in light of severe abdominal pain this could represent a placental abruption.  • Keep all scheduled appointments as recommended.        Parth Power MD  03/08/2023

## 2023-03-10 ENCOUNTER — TELEPHONE (OUTPATIENT)
Dept: OBSTETRICS AND GYNECOLOGY | Facility: CLINIC | Age: 33
End: 2023-03-10
Payer: MEDICARE

## 2023-03-10 NOTE — TELEPHONE ENCOUNTER
Per Autumn with [facility] - Patient called and moved follow up with [facility] from 3/20/23 to 3/27/23.

## 2023-03-13 ENCOUNTER — HOSPITAL ENCOUNTER (OUTPATIENT)
Facility: HOSPITAL | Age: 33
Discharge: HOME | DRG: 832 | End: 2023-03-13
Attending: OBSTETRICS & GYNECOLOGY | Admitting: OBSTETRICS & GYNECOLOGY
Payer: MEDICARE

## 2023-03-13 VITALS
HEART RATE: 109 BPM | RESPIRATION RATE: 20 BRPM | DIASTOLIC BLOOD PRESSURE: 60 MMHG | OXYGEN SATURATION: 99 % | SYSTOLIC BLOOD PRESSURE: 108 MMHG

## 2023-03-13 PROCEDURE — G0463 HOSPITAL OUTPT CLINIC VISIT: HCPCS

## 2023-03-13 PROCEDURE — 59025 FETAL NON-STRESS TEST: CPT

## 2023-03-13 RX ORDER — PRENATAL VIT NO.126/IRON/FOLIC 28MG-0.8MG
TABLET ORAL DAILY
COMMUNITY

## 2023-03-13 NOTE — NON STRESS TEST
Obstetrical Non-stress Test Interpretation     Name:  Rosie Bradley  MRN: [id number]    32 y.o. female  at 33w1d    Indication: elevated BP, GDM, high risk 3rd trimester pregnancy       Fetal Assessment  Fetal Movement: active  Fetal HR Assessment Method: external  Fetal HR (beats/min): 140  Fetal HR Baseline: normal range  Fetal HR Variability: moderate (amplitude range 6 to 25 bpm)  Fetal HR Accelerations: greater than/equal to 15 bpm, lasting at least 15 seconds  Fetal HR Decelerations: absent  Sinusoidal Pattern Present: absent  Fetal HR Tracing Category: Category I    /60 (BP Location: Right arm, Patient Position: Sitting)   Pulse 109   Resp 20   LMP 2022   SpO2 99%     Reason for test: OB Triage, Hypertension, Diabetes, Other (Comment) (obesity)  Date of Test: 3/13/2023  Time frame of test: 1693-5067  RN NST Interpretation: Reactive      Jeffrey Hope RN  3/13/2023  08:44 EDT

## 2023-03-13 NOTE — SIGNIFICANT NOTE
Dr Moran notified of pts arrival for scheduled NST (dr Power signed off to him from the hours of 5214-6342) asked him to look at pts tracing. MD states reactive ok to discharge home.

## 2023-03-16 ENCOUNTER — APPOINTMENT (OUTPATIENT)
Dept: ULTRASOUND IMAGING | Facility: HOSPITAL | Age: 33
DRG: 832 | End: 2023-03-16
Payer: MEDICARE

## 2023-03-16 ENCOUNTER — HOSPITAL ENCOUNTER (INPATIENT)
Facility: HOSPITAL | Age: 33
LOS: 1 days | Discharge: HOME | DRG: 832 | End: 2023-03-17
Attending: OBSTETRICS & GYNECOLOGY | Admitting: OBSTETRICS & GYNECOLOGY
Payer: MEDICARE

## 2023-03-16 ENCOUNTER — PATIENT OUTREACH (OUTPATIENT)
Dept: LABOR AND DELIVERY | Facility: HOSPITAL | Age: 33
End: 2023-03-16
Payer: MEDICARE

## 2023-03-16 ENCOUNTER — ROUTINE PRENATAL (OUTPATIENT)
Dept: OBSTETRICS AND GYNECOLOGY | Facility: CLINIC | Age: 33
End: 2023-03-16
Payer: MEDICARE

## 2023-03-16 ENCOUNTER — HOSPITAL ENCOUNTER (OUTPATIENT)
Dept: LABOR AND DELIVERY | Facility: HOSPITAL | Age: 33
Discharge: HOME | DRG: 832 | End: 2023-03-16
Payer: MEDICARE

## 2023-03-16 VITALS — BODY MASS INDEX: 57.16 KG/M2 | SYSTOLIC BLOOD PRESSURE: 168 MMHG | DIASTOLIC BLOOD PRESSURE: 99 MMHG | WEIGHT: 283 LBS

## 2023-03-16 DIAGNOSIS — Z30.09 UNWANTED FERTILITY: ICD-10-CM

## 2023-03-16 DIAGNOSIS — Z86.32 HISTORY OF GESTATIONAL DIABETES IN PRIOR PREGNANCY, CURRENTLY PREGNANT: ICD-10-CM

## 2023-03-16 DIAGNOSIS — O99.210 MATERNAL OBESITY AFFECTING PREGNANCY, ANTEPARTUM: ICD-10-CM

## 2023-03-16 DIAGNOSIS — O09.93 HIGH-RISK PREGNANCY IN THIRD TRIMESTER: Primary | ICD-10-CM

## 2023-03-16 DIAGNOSIS — O14.00 ANTEPARTUM MILD PREECLAMPSIA: ICD-10-CM

## 2023-03-16 DIAGNOSIS — Z28.39 RUBELLA NON-IMMUNE STATUS, ANTEPARTUM: ICD-10-CM

## 2023-03-16 DIAGNOSIS — O09.899 RUBELLA NON-IMMUNE STATUS, ANTEPARTUM: ICD-10-CM

## 2023-03-16 DIAGNOSIS — Z98.891 PREVIOUS CESAREAN SECTION: ICD-10-CM

## 2023-03-16 DIAGNOSIS — O09.299 HISTORY OF GESTATIONAL DIABETES IN PRIOR PREGNANCY, CURRENTLY PREGNANT: ICD-10-CM

## 2023-03-16 PROBLEM — O13.9 GESTATIONAL HYPERTENSION: Status: ACTIVE | Noted: 2023-03-16

## 2023-03-16 PROBLEM — Z91.89 AT RISK FOR GESTATIONAL DIABETES MELLITUS: Status: ACTIVE | Noted: 2023-03-16

## 2023-03-16 LAB
ABO GROUP BLD: NORMAL
ALBUMIN SERPL-MCNC: 3.3 G/DL (ref 3.5–5.2)
ALBUMIN/GLOB SERPL: 1 G/DL
ALP SERPL-CCNC: 119 U/L (ref 39–117)
ALT SERPL W P-5'-P-CCNC: 9 U/L (ref 1–33)
AMPHET+METHAMPHET UR QL: NEGATIVE
ANION GAP SERPL CALCULATED.3IONS-SCNC: 10.7 MMOL/L (ref 5–15)
AST SERPL-CCNC: 14 U/L (ref 1–32)
BARBITURATES UR QL SCN: NEGATIVE
BENZODIAZ UR QL SCN: NEGATIVE
BILIRUB SERPL-MCNC: 0.2 MG/DL (ref 0–1.2)
BLD GP AB SCN SERPL QL: NEGATIVE
BUN SERPL-MCNC: 4 MG/DL (ref 6–20)
BUN/CREAT SERPL: 10 (ref 7–25)
CALCIUM SPEC-SCNC: 8.7 MG/DL (ref 8.6–10.5)
CANNABINOIDS SERPL QL: NEGATIVE
CHLORIDE SERPL-SCNC: 105 MMOL/L (ref 98–107)
CO2 SERPL-SCNC: 24.3 MMOL/L (ref 22–29)
COCAINE UR QL: NEGATIVE
CREAT SERPL-MCNC: 0.4 MG/DL (ref 0.57–1)
CREAT SERPL-MCNC: 0.4 MG/DL (ref 0.57–1)
DEPRECATED RDW RBC AUTO: 46.8 FL (ref 37–54)
EGFRCR SERPLBLD CKD-EPI 2021: 133.4 ML/MIN/1.73
EGFRCR SERPLBLD CKD-EPI 2021: 133.4 ML/MIN/1.73
ERYTHROCYTE [DISTWIDTH] IN BLOOD BY AUTOMATED COUNT: 14.4 % (ref 12.3–15.4)
GLOBULIN UR ELPH-MCNC: 3.3 GM/DL
GLUCOSE BLDC GLUCOMTR-MCNC: 127 MG/DL (ref 70–99)
GLUCOSE BLDC GLUCOMTR-MCNC: 150 MG/DL (ref 70–99)
GLUCOSE SERPL-MCNC: 84 MG/DL (ref 65–99)
GLUCOSE UR STRIP-MCNC: NEGATIVE MG/DL
HBA1C MFR BLD: 4.8 % (ref 4.8–5.6)
HCT VFR BLD AUTO: 32.3 % (ref 34–46.6)
HGB BLD-MCNC: 10.9 G/DL (ref 12–15.9)
MCH RBC QN AUTO: 30.4 PG (ref 26.6–33)
MCHC RBC AUTO-ENTMCNC: 33.7 G/DL (ref 31.5–35.7)
MCV RBC AUTO: 90 FL (ref 79–97)
METHADONE UR QL SCN: NEGATIVE
OPIATES UR QL: NEGATIVE
OXYCODONE UR QL SCN: NEGATIVE
PLATELET # BLD AUTO: 163 10*3/MM3 (ref 140–450)
PMV BLD AUTO: 10.5 FL (ref 6–12)
POTASSIUM SERPL-SCNC: 3.5 MMOL/L (ref 3.5–5.2)
PROT SERPL-MCNC: 6.6 G/DL (ref 6–8.5)
PROT UR STRIP-MCNC: ABNORMAL MG/DL
RBC # BLD AUTO: 3.59 10*6/MM3 (ref 3.77–5.28)
RH BLD: POSITIVE
SODIUM SERPL-SCNC: 140 MMOL/L (ref 136–145)
T&S EXPIRATION DATE: NORMAL
WBC NRBC COR # BLD: 9.13 10*3/MM3 (ref 3.4–10.8)

## 2023-03-16 PROCEDURE — 85027 COMPLETE CBC AUTOMATED: CPT | Performed by: OBSTETRICS & GYNECOLOGY

## 2023-03-16 PROCEDURE — 86900 BLOOD TYPING SEROLOGIC ABO: CPT | Performed by: OBSTETRICS & GYNECOLOGY

## 2023-03-16 PROCEDURE — 82565 ASSAY OF CREATININE: CPT | Performed by: OBSTETRICS & GYNECOLOGY

## 2023-03-16 PROCEDURE — 80307 DRUG TEST PRSMV CHEM ANLYZR: CPT | Performed by: OBSTETRICS & GYNECOLOGY

## 2023-03-16 PROCEDURE — 99214 OFFICE O/P EST MOD 30 MIN: CPT | Performed by: STUDENT IN AN ORGANIZED HEALTH CARE EDUCATION/TRAINING PROGRAM

## 2023-03-16 PROCEDURE — 76819 FETAL BIOPHYS PROFIL W/O NST: CPT

## 2023-03-16 PROCEDURE — 59025 FETAL NON-STRESS TEST: CPT

## 2023-03-16 PROCEDURE — 25010000002 BETAMETHASONE ACET & SOD PHOS PER 4 MG: Performed by: OBSTETRICS & GYNECOLOGY

## 2023-03-16 PROCEDURE — 86850 RBC ANTIBODY SCREEN: CPT | Performed by: OBSTETRICS & GYNECOLOGY

## 2023-03-16 PROCEDURE — 83036 HEMOGLOBIN GLYCOSYLATED A1C: CPT | Performed by: OBSTETRICS & GYNECOLOGY

## 2023-03-16 PROCEDURE — 86901 BLOOD TYPING SEROLOGIC RH(D): CPT | Performed by: OBSTETRICS & GYNECOLOGY

## 2023-03-16 PROCEDURE — 76815 OB US LIMITED FETUS(S): CPT

## 2023-03-16 PROCEDURE — 82962 GLUCOSE BLOOD TEST: CPT

## 2023-03-16 PROCEDURE — 80053 COMPREHEN METABOLIC PANEL: CPT | Performed by: OBSTETRICS & GYNECOLOGY

## 2023-03-16 RX ORDER — BETAMETHASONE SODIUM PHOSPHATE AND BETAMETHASONE ACETATE 3; 3 MG/ML; MG/ML
12 INJECTION, SUSPENSION INTRA-ARTICULAR; INTRALESIONAL; INTRAMUSCULAR; SOFT TISSUE EVERY 24 HOURS
Status: COMPLETED | OUTPATIENT
Start: 2023-03-16 | End: 2023-03-17

## 2023-03-16 RX ORDER — ASPIRIN 81 MG/1
81 TABLET ORAL DAILY
Status: DISCONTINUED | OUTPATIENT
Start: 2023-03-16 | End: 2023-03-17 | Stop reason: HOSPADM

## 2023-03-16 RX ORDER — PRENATAL VIT/IRON FUM/FOLIC AC 27MG-0.8MG
1 TABLET ORAL DAILY
Status: DISCONTINUED | OUTPATIENT
Start: 2023-03-16 | End: 2023-03-17 | Stop reason: HOSPADM

## 2023-03-16 RX ADMIN — BETAMETHASONE ACETATE AND BETAMETHASONE SODIUM PHOSPHATE 12 MG: 3; 3 INJECTION, SUSPENSION INTRA-ARTICULAR; INTRALESIONAL; INTRAMUSCULAR; SOFT TISSUE at 12:33

## 2023-03-16 RX ADMIN — PRENATAL WITH FERROUS FUM AND FOLIC ACID 1 TABLET: 3080; 920; 120; 400; 22; 1.84; 3; 20; 10; 1; 12; 200; 27; 25; 2 TABLET ORAL at 13:29

## 2023-03-16 RX ADMIN — ASPIRIN 81 MG: 81 TABLET, COATED ORAL at 13:30

## 2023-03-16 NOTE — PROGRESS NOTES
Biophysical profile 8 of 8.  Biometry reviewed.  Difficulty obtaining monitoring because of habitus we will try again

## 2023-03-16 NOTE — PROGRESS NOTES
OB FOLLOW UP  Complaint   Chief Complaint   Patient presents with   • Routine Prenatal Visit            Rosie Bradley is a 32 y.o.  33w4d patient being seen today for her obstetrical follow up visit. Patient denies decreased fetal movement, contractions, loss of fluid or vaginal bleeding. Patient denies any headache, visual disturbances, new onset nausea vomiting, right upper quadrant pain, or new onset swelling.  Unable to obtain a blood pressure cuff secondary to financial constraints.  Unable to check blood sugars at home secondary to not having the batteries and unable to afford batteries.       Her prenatal care is complicated by (and status) :    Patient Active Problem List   Diagnosis   • High-risk pregnancy in third trimester   • Previous  section   • Maternal obesity affecting pregnancy, antepartum   • History of gestational hypertension   • History of gestational diabetes in prior pregnancy, currently pregnant   • Rubella non-immune status, antepartum   • HSV infection   • Unwanted fertility   • Preeclampsia   • Itching       All other systems reviewed and are negative.     The additional following portions of the patient's history were reviewed and updated as appropriate: allergies, current medications, past family history, past medical history, past social history, past surgical history and problem list.      EXAM:     Vital signs: /99   Wt 128 kg (283 lb)   LMP 2022   BMI 57.16 kg/m²   Appearance/psychiatric: To be in no distress  Constitutional: The patient is well nourished.  Cardiovascular: She does not have edema.  Respiratory: Respiratory effort is normal.  Gastrointestinal: Abdomen is soft, gravid, nontender, no rashes, heart tones are present, fundal height is unable to obtain secondary to body habitus    Pelvic Exam: No    Urine glucose/protein: See prenatal flowsheet       Assessment and Plan    Problem List Items Addressed This Visit        Gravid and   "   High-risk pregnancy in third trimester - Primary    Overview     DUDLEY finalized: 23 per LMP, and by 21-week anatomy scan     CF previously negative.    NIPS negative XY    COVID: Fully vaccinated and s/p bivalent booster  Flu: Vaccinated  Tdap:  Recommended  2023 Rx    ?Sterilization: Tubal consents signed 2023    Anatomy US: 22 normal anatomy except limited views of heart and spine, follow up ordered. Posterior placenta.   FU US:  MFM Birch's transverse, 2 pounds 8 ounces EFW 65%.  Anterior placenta  FU US: 3/8 [facility] Estimated fetal weight is 4 pounds 3 ounces this represents the 31st percentile.  The AC is in the 55th percentile. Breech presentation anterior fundal placenta grade 1.  Four-chamber, left ventricular outflow tract, right ventricular outflow track views identified but are limited but appear normal.      PROBLEM LIST/PLAN:   Preeclampsia- admitted [facility] -urine PC ratio 0.36, mildly elevated blood pressures   biweekly nonstress test q Mon Thu   ultrasounds for growth- 2023 recommend FU MFM, message [employer] to assist w next US, pt left last MFM OV before FU could be scheduled   weekly office visits   Continue daily low-dose aspirin   check blood pressures at home.  2023 Rx given for BP cuff.  Return to labor and delivery for any systolics 155 or more diastolics 95 or more, or any HTN symptoms  Hx of GDM/prediabetes - A1C - 4.8, started QID blood sugar checks   No BS log today.  F \"all elevated more than 95, mostly 105\", 2hr PP \"usually normal\".  Recommend pt call w levels and may need tx and mgmt in [place].  Previous  -    vertical skin incision, op note shows high transverse  via midline vertical skin incision  Maternal obesity, pregravid BMI 52-continue ASA 81 mg   delivery - states delivered at 36w6d due to gestational hypertension  Pap smear with cellular changes consistent with HSV-option of serum levels and/or Valtrex " prophylaxis  Rubella nonimmune-vaccinate postpartum         Relevant Medications    Blood Glucose Monitoring Suppl (FreeStyle Lite) w/Device kit    Lancets (freestyle) lancets    glucose blood (FREESTYLE LITE) test strip    Other Relevant Orders    POC Urinalysis Dipstick (Completed)       Impression  1. Pregnancy at 33w4d  2. Fetal status reassuring.   3. Activity and Exercise discussed.    Plan  1.  Preeclampsia -severe range blood pressure in office with trace protein.  Patient to labor and delivery.  Recommendation for a minimum of a 24-hour observation for fetal and maternal wellbeing.  Discussion with OB hospitalist in regards to admission.  Nonstress test, fetal growth, CMP, CBC, 24-hour protein recommended.  2.  Patient with history of gestational diabetes, failed 1 hour glucose tolerance testing never completed 3-hour has been checking fingerstick blood sugars at home.  Recommendation for fingerstick blood sugars in hospital setting.  3.  Follow-up 1 week.  Discussed with patient regards to potential need for delivery at 34 weeks if severe preeclampsia noted with changes to end organs.      Patient was counseled to the following pregnancy precautions:  • Decreased fetal movement, if concern for decreased fetal movement please perform fetal kick counts you are looking for 10 movements in 2 hours.  If concern for fetal movement and not meeting that criteria, please present to triage for evaluation.  • Contractions occurring every 5 minutes for over an hour, lasting 30 to 60 seconds and progressively causing more discomfort, please seek medical attention to rule out labor  • If you believe that your water is broken, place a sanitary pad.  If pad fills in short period of time i.e. less than 5 minutes, take off pad placed another pad.  If this is saturated please present for rule out rupture of membranes  • Vaginal bleeding can be normal in pregnancy, this usually takes a form of spotting.  If having heavier  bleeding like a menstrual period please present for evaluation; especially in light of severe abdominal pain this could represent a placental abruption.  • Keep all scheduled appointments as recommended.        Parth Power MD  03/16/2023

## 2023-03-16 NOTE — OUTREACH NOTE
[facility]  Check-In    Current Estimated Gestational Age: 33w4d    Questions/Answers    Flowsheet Row Responses   Best Method for Contacting Cell   Demographics Reviewed Yes   Able to keep appointments as scheduled Yes   Baby Active/Feeling Fetal Movemen Yes   Equipment presently used at home: Glucometer  [per office staff, unable to get blood pressure cuff covered by insurance, Dr Power notified]   Resource/Environmental Concerns Reliable Transportation   Do you have any questions related to your care experience, your pregnancy, plans for delivery, any concerns, etc? No          Was given batteries on labor and delivery the other day in triage for glucometer due to it was dead and unable to use. States batteries did not fit. Notified that insurance will not cover a blood pressure cuff for her per office staff. Is being sent to hospital for observation. Concerns about child having appointment for hearing test tomorrow. No other questions, needs or concerns.     Terese Schneider RN  Maternity Nurse Navigator    3/16/2023, 11:19 EDT

## 2023-03-16 NOTE — NON STRESS TEST
"Obstetrical Non-stress Test Interpretation     Name:  Rosie Bradley  MRN: [id number]    32 y.o. female  at 33w4d    Indication: hypertension       Fetal Movement: active  Fetal HR Assessment Method: external  Fetal HR (beats/min): 130  Fetal HR Baseline: normal range  Fetal HR Variability: moderate (amplitude range 6 to 25 bpm)  Fetal HR Accelerations: episodic, greater than/equal to 15 bpm, lasting at least 15 seconds  Fetal HR Decelerations: absent  Sinusoidal Pattern Present: absent    Patient Vitals for the past 24 hrs:   BP Temp Temp src Pulse Resp Height Weight   23 1330 143/77 -- -- 91 -- -- --   23 1301 134/77 -- -- 88 -- -- --   23 1252 138/73 -- -- 90 -- -- --   23 1131 141/85 -- -- 99 -- -- --   23 1116 143/96 -- -- 108 -- -- --   23 1112 148/84 98.7 °F (37.1 °C) Oral 100 22 -- --   23 1101 -- -- -- -- -- 149.9 cm (59\") 128 kg (283 lb)       /77 (BP Location: Right arm, Patient Position: Sitting)   Pulse 91   Temp 98.7 °F (37.1 °C) (Oral)   Resp 22   Ht 149.9 cm (59\")   Wt 128 kg (283 lb)   LMP 2022   BMI 57.16 kg/m²     Reason for test: Hypertension  Date of Test: 3/16/2023  Time frame of test: 1592-6484  RN NST Interpretation: Reactive      Shaila Hu RN  3/16/2023  15:58 EDT      I have reviewed NST and agree it is reactive. TNEELY   "

## 2023-03-16 NOTE — H&P
[facility]  Obstetric History and Physical    Chief Complaint   Patient presents with   • Elevated Blood Pressure     Sent from office          Subjective:   HPI:    Patient is a 32 y.o. female  currently at 33w4d, who presents with 168/99 in office.  She had had a positive urine protein creatinine ratio earlier in pregnancy.  No history of steroids during pregnancy.  I discussed case with Dr. Castillo.  Patient denies headaches visual changes or epigastric pain.  Her first delivery was by  section.    [facility].  Past OB history is noted below.      The following portions of the patients history were reviewed and updated as appropriate:   current medications, allergies, past medical history, past surgical history, past family history, past social history and current problem list.     Prenatal Information:  Prenatal Results     POC Urine Glucose/Protein     Test Value Reference Range Date Time    Urine Glucose  Negative mg/dL Negative 23 1021    Urine Protein  Trace mg/dL Negative 23 1021          Initial Prenatal Labs     Test Value Reference Range Date Time    Hemoglobin  11.7 g/dL 12.0 - 15.9 12/10/22 1949       11.7 g/dL 12.0 - 15.9 22 1038    Hematocrit  34.4 % 34.0 - 46.6 12/10/22 1949       35.8 % 34.0 - 46.6 22 1038    Platelets  176 10*3/mm3 140 - 450 12/10/22 1949       166 10*3/mm3 140 - 450 22 1038    Rubella IgG  <0.90 index Immune >0.99 22 1038    Hepatitis B SAg  Non-Reactive  Non-Reactive 22 1038    Hepatitis C Ab  Non-Reactive  Non-Reactive 22 1038    RPR  Non-Reactive  Non-Reactive 22 1038    T. Pallidum Ab         ABO  O   22 1038    Rh  Positive   22 1038    Antibody Screen  Negative   22 1038    HIV  Non-Reactive  Non-Reactive 22 1038    Urine Culture  >100,000 CFU/mL Mixed Neda Isolated   22 1029    Gonorrhea  Negative  Negative 22 1029    Chlamydia  Negative   Negative 12/08/22 1029    TSH        HgB A1c   4.80 % 4.80 - 5.60 01/19/23 1035       5.10 % 4.80 - 5.60 12/08/22 1038          2nd and 3rd Trimester     Test Value Reference Range Date Time    Hemoglobin (repeated)  10.9 g/dL 12.0 - 15.9 03/16/23 1227       12.0 g/dL 12.0 - 15.9 03/08/23 1302       12.5 g/dL 12.0 - 15.9 03/06/23 0844       10.9 g/dL 12.0 - 15.9 02/02/23 1634       11.4 g/dL 12.0 - 15.9 01/19/23 1035    Hematocrit (repeated)  32.3 % 34.0 - 46.6 03/16/23 1227       34.9 % 34.0 - 46.6 03/08/23 1302       38.1 % 34.0 - 46.6 03/06/23 0844       32.2 % 34.0 - 46.6 02/02/23 1634       33.8 % 34.0 - 46.6 01/19/23 1035    Platelets   163 10*3/mm3 140 - 450 03/16/23 1227       191 10*3/mm3 140 - 450 03/08/23 1302       188 10*3/mm3 140 - 450 03/06/23 0844       167 10*3/mm3 140 - 450 02/02/23 1634       186 10*3/mm3 140 - 450 01/19/23 1035       176 10*3/mm3 140 - 450 12/10/22 1949       166 10*3/mm3 140 - 450 12/08/22 1038    GCT  148 mg/dL 65 - 139 02/02/23 1634    Antibody Screen (repeated)        GTT Fasting  147 mg/dL 65 - 94 02/02/23 1634    GTT 1 Hr        GTT 2 Hr        GTT 3 Hr        Group B Strep              Drug Screening     Test Value Reference Range Date Time    Amphetamine Screen        Barbiturate Screen        Benzodiazepine Screen        Methadone Screen        Phencyclidine Screen        Opiates Screen        THC Screen        Cocaine Screen        Propoxyphene Screen        Buprenorphine Screen        Methamphetamine Screen        Oxycodone Screen        Tricyclic Antidepressants Screen              Other (Risk screening)     Test Value Reference Range Date Time    Varicella IgG        Parvovirus IgG        CMV IgG        Cystic Fibrosis        Hemoglobin electrophoresis        NIPT        MSAFP-4        AFP (for NTD only)              Legend    ^: Historical                      External Prenatal Results     Pregnancy Outside Results - Transcribed From Office Records - See Scanned  Records For Details     Test Value Date Time    ABO  O  22 1038    Rh  Positive  22 1038    Antibody Screen  Negative  22 1038    Varicella IgG       Rubella  <0.90 index 22 1038    Hgb  10.9 g/dL 23 1227       12.0 g/dL 23 1302       12.5 g/dL 23 0844       10.9 g/dL 23 1634       11.4 g/dL 23 1035       11.7 g/dL 12/10/22 1949       11.7 g/dL 22 1038    Hct  32.3 % 23 1227       34.9 % 23 1302       38.1 % 23 0844       32.2 % 23 1634       33.8 % 23 1035       34.4 % 12/10/22 1949       35.8 % 22 1038    Glucose Fasting GTT  147 mg/dL 23 1634    Glucose Tolerance Test 1 hour       Glucose Tolerance Test 3 hour       Gonorrhea (discrete)  Negative  22 1029    Chlamydia (discrete)  Negative  22 1029    RPR  Non-Reactive  22 1038    VDRL       Syphilis Antibody       HBsAg  Non-Reactive  22 1038    Herpes Simplex Virus PCR       Herpes Simplex VIrus Culture       HIV  Non-Reactive  22 1038    Hep C RNA Quant PCR       Hep C Antibody  Non-Reactive  22 1038    AFP       Group B Strep       GBS Susceptibility to Clindamycin       GBS Susceptibility to Erythromycin       Fetal Fibronectin       Genetic Testing, Maternal Blood             Drug Screening     Test Value Date Time    Urine Drug Screen       Amphetamine Screen       Barbiturate Screen       Benzodiazepine Screen       Methadone Screen       Phencyclidine Screen       Opiates Screen       THC Screen       Cocaine Screen       Propoxyphene Screen       Buprenorphine Screen       Methamphetamine Screen       Oxycodone Screen       Tricyclic Antidepressants Screen             Legend    ^: Historical                         Past OB History:     OB History    Para Term  AB Living   2 1 0 1 0 1   SAB IAB Ectopic Molar Multiple Live Births   0 0 0 0 0 1      # Outcome Date GA Lbr Ricky/2nd Weight Sex Delivery Anes PTL  Lv   2 Current            1  19 36w6d  2750 g (6 lb 1 oz) M [id number]-[id number]  N TEENA      Complications: Hypertension, Gestational diabetes       Past Medical History: Past Medical History:   Diagnosis Date   • Gestational diabetes    • Pre-diabetes     boarderline diabetes   • Preeclampsia    • Previous  delivery, antepartum 2022    Delivered at 36w6d due to gestational hypertension      Past Surgical History Past Surgical History:   Procedure Laterality Date   •  SECTION     • MANDIBLE FRACTURE SURGERY     • TONSILLECTOMY     • WISDOM TOOTH EXTRACTION        Family History: Family History   Problem Relation Age of Onset   • Diabetes Mother    • Hypertension Sister       Social History:  reports that she has never smoked. She has never used smokeless tobacco.   reports no history of alcohol use.   reports no history of drug use.        General ROS: Pertinent items are noted in HPI    Home Medications:  FreeStyle Lite, aspirin, freestyle, glucose blood, and prenatal vitamin    Allergies:  No Known Allergies    Objective       Vital Signs Range for the last 24 hours  Temperature: Temp:  [98.7 °F (37.1 °C)] 98.7 °F (37.1 °C)   Temp Source: Temp src: Oral   BP: BP: (141-168)/(84-99) 141/85   Pulse: Heart Rate:  [] 99   Respirations: Resp:  [22] 22   SPO2:       Physical Examination:   General appearance - alert, well appearing, and in no distress  Mental status - alert, oriented to person, place, and time  Chest - clear to auscultation, no wheezes, rales or rhonchi, symmetric air entry  Heart - normal rate, regular rhythm, normal S1, S2, no murmurs, rubs, clicks or gallops  Abdomen - soft, nontender, nondistended, no masses or organomegaly  Pelvic - normal external genitalia, vulva, vagina, cervix, uterus and adnexa  Back exam - full range of motion, no tenderness, palpable spasm or pain on motion  Neurological - alert, oriented, normal speech, no focal findings or movement disorder  noted  Extremities - peripheral pulses normal, no pedal edema, no clubbing or cyanosis  Skin - normal coloration and turgor, no rashes, no suspicious skin lesions noted         Cervix:     Dilation:     Effacement:     Station:         Fetal Heart Rate Assessment   Method:     Beats/min:     Baseline:     Variability:     Accels:     Decels:     Tracing Category:       Uterine Assessment   Method:     Frequency (min):     Ctx Count in 10 min:     Duration:     Intensity:     [place] Units:           Assessment & Plan       ASSESSMENT with PLAN:   Active Hospital Problems    Diagnosis  POA   • **Gestational hypertension [O13.9]  Yes   • At risk for gestational diabetes mellitus [Z91.89]  Not Applicable     Patient 1 hour Glucola was 145 did not get 3-hour being followed with postprandials though spotty     • Itching [L29.9]  Yes   • Preeclampsia [O14.00]  Yes     Admitted [facility] H2/2-urine PC ratio 0.3 blood pressures mild range     • Unwanted fertility [Z30.09]  Yes     2023 tubal paperwork to be signed today.     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299, Z86.32]  Not Applicable     States had GDM with previous pregnancy and is considered pre-diabetic.  Will check A1C and start QID blood glucose checks.   22 A1C 5.1  2023 continue with FSBS as approaching 24 weeks   2023 1 hour glucose tolerance  "testing     • High-risk pregnancy in third trimester [O09.93]  Not Applicable     DUDLEY finalized: 23 per LMP, and by 21-week anatomy scan     CF previously negative.    NIPS negative XY    COVID: Fully vaccinated and s/p bivalent booster  Flu: Vaccinated  Tdap:  Recommended  2023 Rx    ?Sterilization: Tubal consents signed 2023    Anatomy US: 22 normal anatomy except limited views of heart and spine, follow up ordered. Posterior placenta.   FU US:  MFM Birch's transverse, 2 pounds 8 ounces EFW 65%.  Anterior placenta  FU US: 3/8 [facility] Estimated fetal weight is 4 pounds 3 ounces this represents the 31st percentile.  The AC is in the 55th percentile. Breech presentation anterior fundal placenta grade 1.  Four-chamber, left ventricular outflow tract, right ventricular outflow track views identified but are limited but appear normal.      PROBLEM LIST/PLAN:   Preeclampsia- admitted [facility] -urine PC ratio 0.36, mildly elevated blood pressures   biweekly nonstress test q Mon Thu   ultrasounds for growth- 2023 recommend FU MFM, message [employer] to assist w next US, pt left last MFM OV before FU could be scheduled   weekly office visits   Continue daily low-dose aspirin   check blood pressures at home.  2023 Rx given for BP cuff.  Return to labor and delivery for any systolics 155 or more diastolics 95 or more, or any HTN symptoms  Hx of GDM/prediabetes - A1C - 4.8, started QID blood sugar checks   No BS log today.  F \"all elevated more than 95, mostly 105\", 2hr PP \"usually normal\".  Recommend pt call w levels and may need tx and mgmt in [place].  Previous  -    vertical skin incision, op note shows high transverse  via midline vertical skin incision  Maternal obesity, pregravid BMI 52-continue ASA 81 mg   delivery - states delivered at 36w6d due to gestational hypertension  Pap smear with cellular changes consistent with HSV-option of serum levels and/or " Valtrex prophylaxis  Rubella nonimmune-vaccinate postpartum        In light of severe range pressure and positive in office will plan to go ahead and give steroids and observe.  We will get biophysical profile and biometry.  Given her risk for gestational diabetes will monitor glucose after steroids.  Given positive urine protein creatinine ratio will get 24-hour urine for confirmation       Plan of care has been reviewed with patient and patient agrees.   Risks, benefits of treatment plan have been discussed.  All questions have been answered.        Electronically signed by Krishna Hooper MD, 03/16/23, 12:43 PM EDT.

## 2023-03-17 VITALS
SYSTOLIC BLOOD PRESSURE: 138 MMHG | OXYGEN SATURATION: 99 % | HEART RATE: 91 BPM | RESPIRATION RATE: 17 BRPM | DIASTOLIC BLOOD PRESSURE: 84 MMHG | BODY MASS INDEX: 56.8 KG/M2 | HEIGHT: 59 IN | TEMPERATURE: 98.4 F | WEIGHT: 281.75 LBS

## 2023-03-17 LAB
COLLECT DURATION TIME UR: 24 HRS
COLLECT DURATION TIME UR: 24 HRS
CREAT CL 24H UR+SERPL-VRATE: 176.7 ML/MIN (ref 88–128)
CREAT CL 24H UR+SERPL-VRATE: 254.4 L/24 HR (ref 126.7–184.3)
CREAT UR-MCNC: 113.9 MG/DL
CREATINE 24H UR-MRATE: 1.25 G/24 HR (ref 0.7–1.6)
GLUCOSE BLDC GLUCOMTR-MCNC: 124 MG/DL (ref 70–99)
GLUCOSE BLDC GLUCOMTR-MCNC: 137 MG/DL (ref 70–99)
GLUCOSE BLDC GLUCOMTR-MCNC: 90 MG/DL (ref 70–99)
PROT 24H UR-MRATE: 239.8 MG/24HOURS (ref 0–150)
SPECIMEN VOL 24H UR: 1100 ML
SPECIMEN VOL 24H UR: 1100 ML

## 2023-03-17 PROCEDURE — 59025 FETAL NON-STRESS TEST: CPT

## 2023-03-17 PROCEDURE — 25010000002 BETAMETHASONE ACET & SOD PHOS PER 4 MG: Performed by: OBSTETRICS & GYNECOLOGY

## 2023-03-17 PROCEDURE — 84156 ASSAY OF PROTEIN URINE: CPT | Performed by: OBSTETRICS & GYNECOLOGY

## 2023-03-17 PROCEDURE — 82962 GLUCOSE BLOOD TEST: CPT

## 2023-03-17 PROCEDURE — 82575 CREATININE CLEARANCE TEST: CPT | Performed by: OBSTETRICS & GYNECOLOGY

## 2023-03-17 RX ADMIN — ASPIRIN 81 MG: 81 TABLET, COATED ORAL at 09:54

## 2023-03-17 RX ADMIN — PRENATAL WITH FERROUS FUM AND FOLIC ACID 1 TABLET: 3080; 920; 120; 400; 22; 1.84; 3; 20; 10; 1; 12; 200; 27; 25; 2 TABLET ORAL at 09:54

## 2023-03-17 RX ADMIN — BETAMETHASONE ACETATE AND BETAMETHASONE SODIUM PHOSPHATE 12 MG: 3; 3 INJECTION, SUSPENSION INTRA-ARTICULAR; INTRALESIONAL; INTRAMUSCULAR; SOFT TISSUE at 12:38

## 2023-03-17 NOTE — NON STRESS TEST
"Obstetrical Non-stress Test Interpretation     Name:  Rosie Bradley  MRN: [id number]    32 y.o. female  at 33w5d    Indication: htn      Fetal Assessment  Fetal Movement: active  Fetal HR Assessment Method: external  Fetal HR (beats/min): 130  Fetal HR Baseline: normal range  Fetal HR Variability: moderate (amplitude range 6 to 25 bpm)  Fetal HR Accelerations: greater than/equal to 15 bpm, lasting at least 15 seconds  Fetal HR Decelerations: absent  Sinusoidal Pattern Present: absent    /67   Pulse 92   Temp 98.4 °F (36.9 °C) (Oral)   Resp 16   Ht 149.9 cm (59\")   Wt 128 kg (281 lb 12 oz)   LMP 2022   SpO2 100%   BMI 56.91 kg/m²     Reason for test: Hypertension  Date of Test: 3/17/2023  Time frame of test: 7589-1805  RN NST Interpretation: Reactive      Terese Rodríguez  3/17/2023  14:54 EDT  "

## 2023-03-18 NOTE — PLAN OF CARE
Problem: Adult Inpatient Plan of Care  Goal: Plan of Care Review  Outcome: Adequate for Care Transition  Goal: Patient-Specific Goal (Individualized)  Outcome: Adequate for Care Transition  Goal: Absence of Hospital-Acquired Illness or Injury  Outcome: Adequate for Care Transition  Goal: Optimal Comfort and Wellbeing  Outcome: Adequate for Care Transition  Goal: Readiness for Transition of Care  Outcome: Adequate for Care Transition  Intervention: Mutually Develop Transition Plan  Recent Flowsheet Documentation  Taken 3/17/2023 2051 by Linda Silva RN  Transportation Concerns: no car     Problem: Hypertensive Disorders in Pregnancy  Goal: Maternal-Fetal Stabilization  Outcome: Adequate for Care Transition   Goal Outcome Evaluation:         Patient instructed to stay on bedrest until follow up in office. Instructed to watch blood pressures and call office or come to L&D if needed.

## 2023-03-18 NOTE — NON STRESS TEST
"Obstetrical Non-stress Test Interpretation     Name:  Rosie Bradley  MRN: [id number]    32 y.o. female  at 33w5d    Indication: Hypertension      Fetal Assessment  Fetal Movement: active  Fetal HR Assessment Method: external  Fetal HR (beats/min): 130  Fetal HR Baseline: normal range  Fetal HR Variability: moderate (amplitude range 6 to 25 bpm)  Fetal HR Accelerations: greater than/equal to 15 bpm, lasting at least 15 seconds  Fetal HR Decelerations: absent  Sinusoidal Pattern Present: absent    /84 (BP Location: Left arm, Patient Position: Sitting)   Pulse 91   Temp 98.4 °F (36.9 °C) (Oral)   Resp 17   Ht 149.9 cm (59\")   Wt 128 kg (281 lb 12 oz)   LMP 2022   SpO2 99%   BMI 56.91 kg/m²     Reason for test: Hypertension  Date of Test: 3/17/2023  Time frame of test: 3976-0526  RN NST Interpretation: Reactive      Linda Silva RN  3/17/2023  20:49 EDT  "

## 2023-03-18 NOTE — DISCHARGE SUMMARY
Date of admission: 3/17/2023    Admission diagnosis: IUP at 33 weeks and 5 days estimate gestational age with gestational hypertension    Admitting physician: Dr. Krishna Hooper MD    Discharge date: 3/18/2023    Discharge diagnosis: Same    Discharge physician: Jeremiah Moran MD    Discharge condition: Stable    Disposition: Discharge home    Hospital course and discharge exam:  Patient is a 32-year-old  2 para 1 female at 30 weeks and 5 days estimate gestational age who was sent from the office yesterday with elevated blood pressures.  Dr. Power desired for the patient to be admitted for steroids and a 24-hour urine collection.  Patient denies any headache visual disturbance right upper quadrant pain.  Blood pressures during her stay on labor and delivery has been in the mild elevated range.  She received her steroids course and 24-hour urine protein was within normal range.  At the time of exam:  She is afebrile vital signs stable  Lungs are clear to auscultation bilaterally  Heart is regular rate and rhythm  Abdomen soft nontender gravid.    Patient will be discharged home per her desire this evening.  She will remain on modified bedrest until her next obstetrics visit.  She will follow-up sooner for headaches visual disturbance right upper quadrant pain.  She will follow-up sooner for decreased fetal movements or vaginal bleeding.

## 2023-03-22 ENCOUNTER — HOSPITAL ENCOUNTER (OUTPATIENT)
Facility: HOSPITAL | Age: 33
Discharge: HOME | End: 2023-03-22
Attending: OBSTETRICS & GYNECOLOGY | Admitting: OBSTETRICS & GYNECOLOGY
Payer: MEDICARE

## 2023-03-22 ENCOUNTER — ROUTINE PRENATAL (OUTPATIENT)
Dept: OBSTETRICS AND GYNECOLOGY | Facility: CLINIC | Age: 33
End: 2023-03-22
Payer: MEDICARE

## 2023-03-22 VITALS — SYSTOLIC BLOOD PRESSURE: 153 MMHG | BODY MASS INDEX: 57.36 KG/M2 | DIASTOLIC BLOOD PRESSURE: 101 MMHG | WEIGHT: 284 LBS

## 2023-03-22 VITALS — DIASTOLIC BLOOD PRESSURE: 83 MMHG | SYSTOLIC BLOOD PRESSURE: 139 MMHG | HEART RATE: 89 BPM

## 2023-03-22 DIAGNOSIS — Z98.891 PREVIOUS CESAREAN SECTION: ICD-10-CM

## 2023-03-22 DIAGNOSIS — O09.899 RUBELLA NON-IMMUNE STATUS, ANTEPARTUM: ICD-10-CM

## 2023-03-22 DIAGNOSIS — O09.93 HIGH-RISK PREGNANCY IN THIRD TRIMESTER: Primary | ICD-10-CM

## 2023-03-22 DIAGNOSIS — O14.00 ANTEPARTUM MILD PREECLAMPSIA: ICD-10-CM

## 2023-03-22 DIAGNOSIS — Z28.39 RUBELLA NON-IMMUNE STATUS, ANTEPARTUM: ICD-10-CM

## 2023-03-22 DIAGNOSIS — Z91.89 AT RISK FOR GESTATIONAL DIABETES MELLITUS: ICD-10-CM

## 2023-03-22 DIAGNOSIS — Z30.09 UNWANTED FERTILITY: ICD-10-CM

## 2023-03-22 LAB
ALBUMIN SERPL-MCNC: 3.4 G/DL (ref 3.5–5.2)
ALBUMIN/GLOB SERPL: 1 G/DL
ALP SERPL-CCNC: 116 U/L (ref 39–117)
ALT SERPL W P-5'-P-CCNC: 12 U/L (ref 1–33)
ANION GAP SERPL CALCULATED.3IONS-SCNC: 13.2 MMOL/L (ref 5–15)
AST SERPL-CCNC: 12 U/L (ref 1–32)
BILIRUB SERPL-MCNC: 0.2 MG/DL (ref 0–1.2)
BUN SERPL-MCNC: 8 MG/DL (ref 6–20)
BUN/CREAT SERPL: 15.4 (ref 7–25)
CALCIUM SPEC-SCNC: 9.4 MG/DL (ref 8.6–10.5)
CHLORIDE SERPL-SCNC: 102 MMOL/L (ref 98–107)
CO2 SERPL-SCNC: 24.8 MMOL/L (ref 22–29)
CREAT SERPL-MCNC: 0.52 MG/DL (ref 0.57–1)
CREAT UR-MCNC: 47 MG/DL
DEPRECATED RDW RBC AUTO: 46 FL (ref 37–54)
EGFRCR SERPLBLD CKD-EPI 2021: 125.2 ML/MIN/1.73
ERYTHROCYTE [DISTWIDTH] IN BLOOD BY AUTOMATED COUNT: 14.4 % (ref 12.3–15.4)
GLOBULIN UR ELPH-MCNC: 3.3 GM/DL
GLUCOSE SERPL-MCNC: 118 MG/DL (ref 65–99)
GLUCOSE UR STRIP-MCNC: NEGATIVE MG/DL
HCT VFR BLD AUTO: 32.7 % (ref 34–46.6)
HGB BLD-MCNC: 11.1 G/DL (ref 12–15.9)
MCH RBC QN AUTO: 30.3 PG (ref 26.6–33)
MCHC RBC AUTO-ENTMCNC: 33.9 G/DL (ref 31.5–35.7)
MCV RBC AUTO: 89.3 FL (ref 79–97)
PLATELET # BLD AUTO: 172 10*3/MM3 (ref 140–450)
PMV BLD AUTO: 10.2 FL (ref 6–12)
POTASSIUM SERPL-SCNC: 3.6 MMOL/L (ref 3.5–5.2)
PROT ?TM UR-MCNC: 19.1 MG/DL
PROT SERPL-MCNC: 6.7 G/DL (ref 6–8.5)
PROT UR STRIP-MCNC: NEGATIVE MG/DL
PROT/CREAT UR: 0.41 MG/G{CREAT}
RBC # BLD AUTO: 3.66 10*6/MM3 (ref 3.77–5.28)
SODIUM SERPL-SCNC: 140 MMOL/L (ref 136–145)
WBC NRBC COR # BLD: 10.29 10*3/MM3 (ref 3.4–10.8)

## 2023-03-22 PROCEDURE — 80053 COMPREHEN METABOLIC PANEL: CPT | Performed by: OBSTETRICS & GYNECOLOGY

## 2023-03-22 PROCEDURE — 85027 COMPLETE CBC AUTOMATED: CPT | Performed by: OBSTETRICS & GYNECOLOGY

## 2023-03-22 PROCEDURE — 84156 ASSAY OF PROTEIN URINE: CPT | Performed by: OBSTETRICS & GYNECOLOGY

## 2023-03-22 PROCEDURE — 82570 ASSAY OF URINE CREATININE: CPT | Performed by: OBSTETRICS & GYNECOLOGY

## 2023-03-22 PROCEDURE — 59025 FETAL NON-STRESS TEST: CPT

## 2023-03-22 PROCEDURE — 0502F SUBSEQUENT PRENATAL CARE: CPT | Performed by: STUDENT IN AN ORGANIZED HEALTH CARE EDUCATION/TRAINING PROGRAM

## 2023-03-22 NOTE — NURSING NOTE
At patient's bedside, reinforced Dr. Hooper's discharge instructions. Prior to patient leaving unit NST's scheduled for next week. All questions answered. Patient verbalized understanding of all and left unit via ambulatory in stable condition.

## 2023-03-22 NOTE — PROGRESS NOTES
OB FOLLOW UP  Complaint   Chief Complaint   Patient presents with   • Routine Prenatal Visit            Rosie Bradley is a 32 y.o.  34w3d patient being seen today for her obstetrical follow up visit. Patient denies decreased fetal movement, contractions, loss of fluid or vaginal bleeding.  Reports occasional headache relieved with Tylenol.  Denies any visual disturbances, no upper quadrant pain.  No nausea and vomiting.  Denies any swelling lower extremities.  Has not been able check blood sugars at home.  Compliant with prenatal vitamin and aspirin.  Does feel that she is coming up with a cold.  Also having round ligament discomfort.    Her prenatal care is complicated by (and status) :    Patient Active Problem List   Diagnosis   • High-risk pregnancy in third trimester   • Previous  section   • Maternal obesity affecting pregnancy, antepartum   • History of gestational hypertension   • History of gestational diabetes in prior pregnancy, currently pregnant   • Rubella non-immune status, antepartum   • HSV infection   • Unwanted fertility   • Preeclampsia   • Itching   • At risk for gestational diabetes mellitus       All other systems reviewed and are negative.     The additional following portions of the patient's history were reviewed and updated as appropriate: allergies, current medications, past family history, past medical history, past social history, past surgical history and problem list.      EXAM:     Vital signs: BP (!) 153/101   Wt 129 kg (284 lb)   LMP 2022   BMI 57.36 kg/m²   Appearance/psychiatric: To be in no distress  Constitutional: The patient is well nourished.  Cardiovascular: She does not have edema.  Respiratory: Respiratory effort is normal.  Gastrointestinal: Abdomen is soft, gravid, nontender, no rashes, heart tones are present with bedside ultrasound, fundal height is unable to be obtained secondary to body habitus    Pelvic Exam: No    Urine glucose/protein:  See prenatal flowsheet       Assessment and Plan    Problem List Items Addressed This Visit        Endocrine and Metabolic    At risk for gestational diabetes mellitus    Overview     Patient 1 hour Glucola was 145 did not get 3-hour being followed with postprandials though spotty  3/22/2023 normal hemoglobin A1c well admitted.  Unable to check blood sugars at home does not have a battery for her device.  To meet with support staff today to see if we can get supplies for her.            Genitourinary and Reproductive     Unwanted fertility    Overview     2023 tubal paperwork to be signed today.            Gravid and     High-risk pregnancy in third trimester - Primary    Overview     DUDLEY finalized: 23 per LMP, and by 21-week anatomy scan     CF previously negative.    NIPS negative XY    COVID: Fully vaccinated and s/p bivalent booster  Flu: Vaccinated  Tdap:  Recommended  2023 Rx    ?Sterilization: Tubal consents signed 2023    Anatomy US: 22 normal anatomy except limited views of heart and spine, follow up ordered. Posterior placenta.   FU US:  MFM Birch's transverse, 2 pounds 8 ounces EFW 65%.  Anterior placenta  FU US: 3/8 [facility] Estimated fetal weight is 4 pounds 3 ounces this represents the 31st percentile.  The AC is in the 55th percentile. Breech presentation anterior fundal placenta grade 1.  Four-chamber, left ventricular outflow tract, right ventricular outflow track views identified but are limited but appear normal.      PROBLEM LIST/PLAN:   Preeclampsia- admitted [facility] -urine PC ratio 0.36, mildly elevated blood pressures   biweekly nonstress test q Mon Thu   ultrasounds for growth- 2023 recommend FU MFM, message cristofer to assist w next US, pt left last MFM OV before FU could be scheduled   weekly office visits   Continue daily low-dose aspirin   check blood pressures at home.  2023 Rx given for BP cuff.  Return to labor and delivery for any systolics 155  "or more diastolics 95 or more, or any HTN symptoms  Hx of GDM/prediabetes - A1C - 4.8, started QID blood sugar checks   No BS log today.  F \"all elevated more than 95, mostly 105\", 2hr PP \"usually normal\".  Recommend pt call w levels and may need tx and mgmt in [place].  Previous  -    vertical skin incision, op note shows high transverse  via midline vertical skin incision  Maternal obesity, pregravid BMI 52-continue ASA 81 mg   delivery - states delivered at 36w6d due to gestational hypertension  Pap smear with cellular changes consistent with HSV-option of serum levels and/or Valtrex prophylaxis  Rubella nonimmune-vaccinate postpartum         Relevant Medications    Blood Glucose Monitoring Suppl (FreeStyle Lite) w/Device kit    Lancets (freestyle) lancets    glucose blood (FREESTYLE LITE) test strip    Other Relevant Orders    POC Urinalysis Dipstick (Completed)    Preeclampsia    Overview     Admitted  H2/2-urine PC ratio 0.3 blood pressures mild range    Baseline labs - ALT/AST normal 22, P/C ratio 0.25, start ASA 81 mg  2023 patient to labor and delivery for elevated blood pressures and persistent headache  3/22/2023 Admitted for serial Bps and BTMS and 24 TUP; 24 .8   FINDINGS:          FETAL NUMBER:           Single.    POSITION:        Breech.    AMNIOTIC FLUID VOLUME:      ASHLEIGH of 11.8 cm.  PLACENTA LOCATION:             Anterior placenta.    BIPARIETAL DIAMETER:           8.3 cm   33 weeks 4 days             44 percent         [place]  HEAD CIRCUMFERENCE:         30.8 cm 34 weeks 3 days             34 percent  ABD CIRCUMFERENCE:           29.5 cm 33 weeks 3 days             49 percent  FEMUR LENGTH:          6.1 cm   31 weeks 4 days             4 percent  ESTIMATED FETAL WEIGHT:   2097 g; 4 lb 10 oz                  Percentile:  26 percent  HEART RATE:   150 bpm           Relevant Orders    Fetal Nonstress Test    Previous  section    Overview     " Patient with vertical skin incision, will request op report  22 Op report reviewed - primary high transverse  section         Rubella non-immune status, antepartum    Overview     Plan vaccine postpartum            Impression  1. Pregnancy at 34w3d  2. Fetal status reassuring.   3. Activity and Exercise discussed.    Plan  1.  Patient to be scheduled for repeat  with tubal ligation on April 10, 2023 at 37 weeks and 1 day.  Patient did rule in for preeclampsia early in pregnancy.  Most recent admission with 24-hour urine protein collection at 249.  Patient with elevated blood pressures in office today to be sent to labor and delivery triage for CBC and CMP.  Needs to have weekly nonstress test.  2.  Ultrasound performed in hospital with appropriate growth.  Bedside ultrasound breech presentation today.  3.  Patient to meet with support status to get supplies to be able to check blood sugars in regards to batteries.  4.  Follow-up 1 week      Patient was counseled to the following pregnancy precautions:  • Decreased fetal movement, if concern for decreased fetal movement please perform fetal kick counts you are looking for 10 movements in 2 hours.  If concern for fetal movement and not meeting that criteria, please present to triage for evaluation.  • Contractions occurring every 5 minutes for over an hour, lasting 30 to 60 seconds and progressively causing more discomfort, please seek medical attention to rule out labor  • If you believe that your water is broken, place a sanitary pad.  If pad fills in short period of time i.e. less than 5 minutes, take off pad placed another pad.  If this is saturated please present for rule out rupture of membranes  • Vaginal bleeding can be normal in pregnancy, this usually takes a form of spotting.  If having heavier bleeding like a menstrual period please present for evaluation; especially in light of severe abdominal pain this could represent a placental  abruption.  • Keep all scheduled appointments as recommended.        Parth Power MD  03/22/2023

## 2023-03-22 NOTE — PROGRESS NOTES
EDEL Corrales  Obstetric History and Physical    Chief Complaint   Patient presents with   • Non-stress Test         Subjective:   HPI:    Patient is a 32 y.o. female  currently at 34w3d, who presents with elevated blood pressure in the office.  She had been admitted last week for elevated blood pressure.  Denies headaches visual changes or epigastric pain.  Initial blood pressure here diastolic 116 but felt to be related to cuff..  140/116 134/64 132/66 129/76 138/73 129/85 139/83  Blood Pressure        Her prenatal care is through [facility].  Past OB history is noted below.      The following portions of the patients history were reviewed and updated as appropriate:   current medications, allergies, past medical history, past surgical history, past family history, past social history and current problem list.     Prenatal Information:  Prenatal Results     POC Urine Glucose/Protein     Test Value Reference Range Date Time    Urine Glucose  Negative mg/dL Negative 23 0854    Urine Protein  Negative mg/dL Negative 23 0854          Initial Prenatal Labs     Test Value Reference Range Date Time    Hemoglobin  11.7 g/dL 12.0 - 15.9 12/10/22 1949       11.7 g/dL 12.0 - 15.9 22 1038    Hematocrit  34.4 % 34.0 - 46.6 12/10/22 1949       35.8 % 34.0 - 46.6 22 1038    Platelets  176 10*3/mm3 140 - 450 12/10/22 1949       166 10*3/mm3 140 - 450 22 1038    Rubella IgG  <0.90 index Immune >0.99 22 1038    Hepatitis B SAg  Non-Reactive  Non-Reactive 22 1038    Hepatitis C Ab  Non-Reactive  Non-Reactive 22 1038    RPR  Non-Reactive  Non-Reactive 22 1038    T. Pallidum Ab         ABO  O   23 1226    Rh  Positive   23 1226    Antibody Screen  Negative   22 1038    HIV  Non-Reactive  Non-Reactive 22 1038    Urine Culture  >100,000 CFU/mL Mixed Neda Isolated   22 1029    Gonorrhea  Negative  Negative 22 1029    Chlamydia   Negative  Negative 12/08/22 1029    TSH        HgB A1c   4.80 % 4.80 - 5.60 03/16/23 1227       4.80 % 4.80 - 5.60 01/19/23 1035       5.10 % 4.80 - 5.60 12/08/22 1038          2nd and 3rd Trimester     Test Value Reference Range Date Time    Hemoglobin (repeated)  11.1 g/dL 12.0 - 15.9 03/22/23 1006       10.9 g/dL 12.0 - 15.9 03/16/23 1227       12.0 g/dL 12.0 - 15.9 03/08/23 1302       12.5 g/dL 12.0 - 15.9 03/06/23 0844       10.9 g/dL 12.0 - 15.9 02/02/23 1634       11.4 g/dL 12.0 - 15.9 01/19/23 1035    Hematocrit (repeated)  32.7 % 34.0 - 46.6 03/22/23 1006       32.3 % 34.0 - 46.6 03/16/23 1227       34.9 % 34.0 - 46.6 03/08/23 1302       38.1 % 34.0 - 46.6 03/06/23 0844       32.2 % 34.0 - 46.6 02/02/23 1634       33.8 % 34.0 - 46.6 01/19/23 1035    Platelets   172 10*3/mm3 140 - 450 03/22/23 1006       163 10*3/mm3 140 - 450 03/16/23 1227       191 10*3/mm3 140 - 450 03/08/23 1302       188 10*3/mm3 140 - 450 03/06/23 0844       167 10*3/mm3 140 - 450 02/02/23 1634       186 10*3/mm3 140 - 450 01/19/23 1035       176 10*3/mm3 140 - 450 12/10/22 1949       166 10*3/mm3 140 - 450 12/08/22 1038    GCT  148 mg/dL 65 - 139 02/02/23 1634    Antibody Screen (repeated)  Negative   03/16/23 1226    GTT Fasting  147 mg/dL 65 - 94 02/02/23 1634    GTT 1 Hr        GTT 2 Hr        GTT 3 Hr        Group B Strep              Drug Screening     Test Value Reference Range Date Time    Amphetamine Screen        Barbiturate Screen  Negative  Negative 03/16/23 1232    Benzodiazepine Screen  Negative  Negative 03/16/23 1232    Methadone Screen  Negative  Negative 03/16/23 1232    Phencyclidine Screen        Opiates Screen  Negative  Negative 03/16/23 1232    THC Screen  Negative  Negative 03/16/23 1232    Cocaine Screen  Negative  Negative 03/16/23 1232    Propoxyphene Screen        Buprenorphine Screen        Methamphetamine Screen        Oxycodone Screen  Negative  Negative 03/16/23 1232    Tricyclic Antidepressants Screen               Other (Risk screening)     Test Value Reference Range Date Time    Varicella IgG        Parvovirus IgG        CMV IgG        Cystic Fibrosis        Hemoglobin electrophoresis        NIPT        MSAFP-4        AFP (for NTD only)              Legend    ^: Historical                      External Prenatal Results     Pregnancy Outside Results - Transcribed From Office Records - See Scanned Records For Details     Test Value Date Time    ABO  O  03/16/23 1226    Rh  Positive  03/16/23 1226    Antibody Screen  Negative  03/16/23 1226       Negative  12/08/22 1038    Varicella IgG       Rubella  <0.90 index 12/08/22 1038    Hgb  11.1 g/dL 03/22/23 1006       10.9 g/dL 03/16/23 1227       12.0 g/dL 03/08/23 1302       12.5 g/dL 03/06/23 0844       10.9 g/dL 02/02/23 1634       11.4 g/dL 01/19/23 1035       11.7 g/dL 12/10/22 1949       11.7 g/dL 12/08/22 1038    Hct  32.7 % 03/22/23 1006       32.3 % 03/16/23 1227       34.9 % 03/08/23 1302       38.1 % 03/06/23 0844       32.2 % 02/02/23 1634       33.8 % 01/19/23 1035       34.4 % 12/10/22 1949       35.8 % 12/08/22 1038    Glucose Fasting GTT  147 mg/dL 02/02/23 1634    Glucose Tolerance Test 1 hour       Glucose Tolerance Test 3 hour       Gonorrhea (discrete)  Negative  12/08/22 1029    Chlamydia (discrete)  Negative  12/08/22 1029    RPR  Non-Reactive  12/08/22 1038    VDRL       Syphilis Antibody       HBsAg  Non-Reactive  12/08/22 1038    Herpes Simplex Virus PCR       Herpes Simplex VIrus Culture       HIV  Non-Reactive  12/08/22 1038    Hep C RNA Quant PCR       Hep C Antibody  Non-Reactive  12/08/22 1038    AFP       Group B Strep       GBS Susceptibility to Clindamycin       GBS Susceptibility to Erythromycin       Fetal Fibronectin       Genetic Testing, Maternal Blood             Drug Screening     Test Value Date Time    Urine Drug Screen       Amphetamine Screen       Barbiturate Screen  Negative  03/16/23 1232    Benzodiazepine Screen  Negative   23 1232    Methadone Screen  Negative  23 1232    Phencyclidine Screen       Opiates Screen  Negative  23 1232    THC Screen  Negative  23 1232    Cocaine Screen       Propoxyphene Screen       Buprenorphine Screen       Methamphetamine Screen       Oxycodone Screen  Negative  23 1232    Tricyclic Antidepressants Screen             Legend    ^: Historical                         Past OB History:     OB History    Para Term  AB Living   2 1 0 1 0 1   SAB IAB Ectopic Molar Multiple Live Births   0 0 0 0 0 1      # Outcome Date GA Lbr Ricky/2nd Weight Sex Delivery Anes PTL Lv   2 Current            1  19 36w6d  2750 g (6 lb 1 oz) M [id number]  N TEENA      Complications: Hypertension, Gestational diabetes       Past Medical History: Past Medical History:   Diagnosis Date   • Gestational diabetes    • Pre-diabetes     boarderline diabetes   • Preeclampsia    • Previous  delivery, antepartum 2022    Delivered at 36w6d due to gestational hypertension      Past Surgical History Past Surgical History:   Procedure Laterality Date   •  SECTION     • MANDIBLE FRACTURE SURGERY     • TONSILLECTOMY     • WISDOM TOOTH EXTRACTION        Family History: Family History   Problem Relation Age of Onset   • Diabetes Mother    • Hypertension Sister       Social History:  reports that she has never smoked. She has never used smokeless tobacco.   reports no history of alcohol use.   reports no history of drug use.        General ROS: Pertinent items are noted in HPI    Home Medications:  FreeStyle Lite, aspirin, freestyle, glucose blood, and prenatal vitamin    Allergies:  No Known Allergies    Objective       Vital Signs Range for the last 24 hours  Temperature:     Temp Source:     BP: BP: (129-163)/() 139/83   Pulse: Heart Rate:  [85-94] 89   Respirations:     SPO2:       Physical Examination:   General appearance - alert, well appearing, and in no  distress  Mental status - alert, oriented to person, place, and time  Chest - clear to auscultation, no wheezes, rales or rhonchi, symmetric air entry  Heart - normal rate, regular rhythm, normal S1, S2, no murmurs, rubs, clicks or gallops  Abdomen - soft, nontender, nondistended, no masses or organomegaly  Pelvic - normal external genitalia, vulva, vagina, cervix, uterus and adnexa  Back exam - full range of motion, no tenderness, palpable spasm or pain on motion  Neurological - alert, oriented, normal speech, no focal findings or movement disorder noted  Extremities - peripheral pulses normal, no pedal edema, no clubbing or cyanosis  Skin - normal coloration and turgor, no rashes, no suspicious skin lesions noted    Presentation:    Cervix:     Dilation:     Effacement:     Station:         Fetal Heart Rate Assessment   Method: Fetal HR Assessment Method: external   Beats/min: Fetal HR (beats/min): 145   Baseline: Fetal HR Baseline: normal range   Variability: Fetal HR Variability: moderate (amplitude range 6 to 25 bpm)   Accels: Fetal HR Accelerations: episodic, greater than/equal to 15 bpm, lasting at least 15 seconds   Decels: Fetal HR Decelerations: absent   Tracing Category:       Uterine Assessment   Method: Method: palpation, external tocotransducer   Frequency (min):     Ctx Count in 10 min:     Duration:     Intensity: Contraction Intensity: no contractions   [place]ary Units:          Assessment & Plan       ASSESSMENT with PLAN:   Active Hospital Problems    Diagnosis  POA   • At risk for gestational diabetes mellitus [Z91.89]  Not Applicable     Patient 1 hour Glucola was 145 did not get 3-hour being followed with postprandials though spotty  3/22/2023 normal hemoglobin A1c well admitted.  Unable to check blood sugars at home does not have a battery for her device.  To meet with support staff today to see if we can get supplies for her.     • Itching [L29.9]  Yes   • Preeclampsia [O14.00]  Yes      Admitted [facility] H2/2-urine PC ratio 0.3 blood pressures mild range    Baseline labs - ALT/AST normal 12/8/22, P/C ratio 0.25, start ASA 81 mg  2/2/2023 patient to labor and delivery for elevated blood pressures and persistent headache  3/22/2023 Admitted for serial Bps and BTMS and 24 TUP; 24 .8   FINDINGS:          FETAL NUMBER:           Single.    POSITION:        Breech.    AMNIOTIC FLUID VOLUME:      ASHLEIGH of 11.8 cm.  PLACENTA LOCATION:             Anterior placenta.    BIPARIETAL DIAMETER:           8.3 cm   33 weeks 4 days             44 percent         [place]  HEAD CIRCUMFERENCE:         30.8 cm 34 weeks 3 days             34 percent  ABD CIRCUMFERENCE:           29.5 cm 33 weeks 3 days             49 percent  FEMUR LENGTH:          6.1 cm   31 weeks 4 days             4 percent  ESTIMATED FETAL WEIGHT:   2097 g; 4 lb 10 oz                  Percentile:  26 percent  HEART RATE:   150 bpm       • HSV infection [B00.9]  Yes   • History of gestational diabetes in prior pregnancy, currently pregnant [O09.299, Z86.32]  Not Applicable     States had GDM with previous pregnancy and is considered pre-diabetic.  Will check A1C and start QID blood glucose checks.   12/9/22 A1C 5.1  1/6/2023 continue with FSBS as approaching 24 weeks   2/2/2023 1 hour glucose tolerance testing     • High-risk pregnancy in third trimester [O09.93]  Not Applicable     DUDLEY finalized: 4/30/23 per LMP, and by 21-week anatomy scan     CF previously negative.    NIPS negative XY    COVID: Fully vaccinated and s/p bivalent booster  Flu: Vaccinated  Tdap:  Recommended  2/27/2023 Rx    ?Sterilization: Tubal consents signed 2/2/2023    Anatomy US: 12/29/22 normal anatomy except limited views of heart and spine, follow up ordered. Posterior placenta.   FU US: 1/30 MFM Birch's transverse, 2 pounds 8 ounces EFW 65%.  Anterior placenta  FU US: 3/8 [facility] Estimated fetal weight is 4 pounds 3 ounces this represents the 31st percentile.  The AC is in  "the 55th percentile. Breech presentation anterior fundal placenta grade 1.  Four-chamber, left ventricular outflow tract, right ventricular outflow track views identified but are limited but appear normal.      PROBLEM LIST/PLAN:   Preeclampsia- admitted [facility] 2/2-urine PC ratio 0.36, mildly elevated blood pressures   biweekly nonstress test q Mon Thu   ultrasounds for growth- 2023 recommend FU MFM, message [facility] to assist w next              3/22/2023 after initial blood pressures blood pressures were good in triage denied headache visual changes epigastric pain.  Urine protein creatinine ratio positive but creatinine AST ALT good platelets good.  Prenatal care began late second trimester.  She did have a elevated blood pressure before 20 weeks raising concern for chronic hypertensive component.  Dr. Castillo has not made arrangements for MFM consultation regarding treatment and delayed timing of delivery   US, pt left last MFM OV before FU could be scheduled   weekly office visits   Continue daily low-dose aspirin   check blood pressures at home.  2023 Rx given for BP cuff.  Return to labor and delivery for any systolics 155 or more diastolics 95 or more, or any HTN symptoms  Hx of GDM/prediabetes - A1C - 4.8, started QID blood sugar checks   No BS log today.  F \"all elevated more than 95, mostly 105\", 2hr PP \"usually normal\".  Recommend pt call w levels and may need tx and mgmt in [place].  Previous  -    vertical skin incision, op note shows high transverse  via midline vertical skin incision  Maternal obesity, pregravid BMI 52-continue ASA 81 mg   delivery - states delivered at 36w6d due to gestational hypertension  Pap smear with cellular changes consistent with HSV-option of serum levels and/or Valtrex prophylaxis  Rubella nonimmune-vaccinate postpartum     • History of gestational hypertension [Z87.59]  Not Applicable     Baseline labs - ALT/AST normal 22, P/C " ratio 0.25, start ASA 81 mg  2023 patient to labor and delivery for elevated blood pressures and persistent headache  3/22/2023 Admitted for serial Bps and BTMS and 24 TUP; 24 .8   FINDINGS:          FETAL NUMBER:           Single.    POSITION:        Breech.    AMNIOTIC FLUID VOLUME:      ASHLEIGH of 11.8 cm.  PLACENTA LOCATION:             Anterior placenta.    BIPARIETAL DIAMETER:           8.3 cm   33 weeks 4 days             44 percent         [place]  HEAD CIRCUMFERENCE:         30.8 cm 34 weeks 3 days             34 percent  ABD CIRCUMFERENCE:           29.5 cm 33 weeks 3 days             49 percent  FEMUR LENGTH:          6.1 cm   31 weeks 4 days             4 percent  ESTIMATED FETAL WEIGHT:   2097 g; 4 lb 10 oz                  Percentile:  26 percent  HEART RATE:   150 bpm         • Previous  section [Z98.891]  Not Applicable     Patient with vertical skin incision, will request op report  22 Op report reviewed - primary high transverse  section            Patient and family t agrees.   Risks, benefits of treatment plan have been discussed.  All questions have been answered.        Electronically signed by Krishna Hooper MD, 23, 4:23 PM EDT.

## 2023-03-22 NOTE — NON STRESS TEST
Obstetrical Non-stress Test Interpretation     Name:  Rosie Bradley  MRN: [id number]    32 y.o. female  at 34w3d    Indication: hypertension       Fetal Movement: active  Fetal HR Assessment Method: external  Fetal HR (beats/min): 150  Fetal HR Baseline: normal range  Fetal HR Variability: moderate (amplitude range 6 to 25 bpm)  Fetal HR Accelerations: episodic, greater than/equal to 15 bpm, lasting at least 15 seconds  Fetal HR Decelerations: absent  Sinusoidal Pattern Present: absent    Patient Vitals for the past 24 hrs:   BP Pulse   23 1201 139/83 89   23 1151 129/85 92   23 1141 138/73 90   23 1131 129/76 85   23 1121 132/66 90   23 1111 134/64 93   23 1032 (!) 140/116 94       /76 (BP Location: Right arm, Patient Position: Lying)   Pulse 85   LMP 2022     Reason for test: Hypertension  Date of Test: 3/22/2023  Time frame of test: 4778-5026  RN NST Interpretation: Reactive      Shaila Hu RN  3/22/2023  12:19 EDT        I have reviewed NST and agree it is reactive. TNELVIELY

## 2023-03-27 ENCOUNTER — ANESTHESIA EVENT (OUTPATIENT)
Dept: LABOR AND DELIVERY | Facility: HOSPITAL | Age: 33
End: 2023-03-27
Payer: MEDICARE

## 2023-03-29 ENCOUNTER — ROUTINE PRENATAL (OUTPATIENT)
Dept: OBSTETRICS AND GYNECOLOGY | Facility: CLINIC | Age: 33
End: 2023-03-29
Payer: MEDICARE

## 2023-03-29 VITALS — DIASTOLIC BLOOD PRESSURE: 86 MMHG | BODY MASS INDEX: 55.54 KG/M2 | SYSTOLIC BLOOD PRESSURE: 140 MMHG | WEIGHT: 275 LBS

## 2023-03-29 DIAGNOSIS — O09.93 HIGH-RISK PREGNANCY IN THIRD TRIMESTER: Primary | ICD-10-CM

## 2023-03-29 DIAGNOSIS — R05.1 ACUTE COUGH: ICD-10-CM

## 2023-03-29 LAB
GLUCOSE UR STRIP-MCNC: NEGATIVE MG/DL
PROT UR STRIP-MCNC: NEGATIVE MG/DL

## 2023-03-29 RX ORDER — GUAIFENESIN 600 MG/1
600 TABLET, EXTENDED RELEASE ORAL 2 TIMES DAILY
Qty: 28 TABLET | Refills: 0 | Status: SHIPPED | OUTPATIENT
Start: 2023-03-29 | End: 2023-04-12

## 2023-03-29 NOTE — PROGRESS NOTES
OB FOLLOW UP  Complaint   Chief Complaint   Patient presents with   • Routine Prenatal Visit            Rosie Bradley is a 32 y.o.  35w3d patient being seen today for her obstetrical follow up visit. Patient denies decreased fetal movement, contractions, loss of fluid or vaginal bleeding. Patient denies any headache, visual disturbances, new onset nausea vomiting, right upper quadrant pain, or new onset swelling.  Patient was seen by MFM this past Monday.  Appropriate fetal growth.  Patient reports having a cough for the last 7 days, denies any fevers or chills.  Is productive.  Has not taking thing for this.      Her prenatal care is complicated by (and status) :    Patient Active Problem List   Diagnosis   • High-risk pregnancy in third trimester   • Previous  section   • Maternal obesity affecting pregnancy, antepartum   • History of gestational hypertension   • History of gestational diabetes in prior pregnancy, currently pregnant   • Rubella non-immune status, antepartum   • HSV infection   • Unwanted fertility   • Preeclampsia   • Itching   • At risk for gestational diabetes mellitus       All other systems reviewed and are negative.     The additional following portions of the patient's history were reviewed and updated as appropriate: allergies, current medications, past family history, past medical history, past social history, past surgical history and problem list.      EXAM:     Vital signs: /86   Wt 125 kg (275 lb)   LMP 2022   BMI 55.54 kg/m²   Appearance/psychiatric: To be in no distress  Constitutional: The patient is well nourished.  Cardiovascular: She does not have edema.  Respiratory: Respiratory effort is normal.  Gastrointestinal: Abdomen is soft, gravid, nontender, no rashes, heart tones are present, fundal height is Unable to be obtained secondary to body habitus    Pelvic Exam: No    Urine glucose/protein: See prenatal flowsheet       Assessment and  "Plan    Problem List Items Addressed This Visit        Gravid and     High-risk pregnancy in third trimester - Primary    Overview     DUDLEY finalized: 23 per LMP, and by 21-week anatomy scan     CF previously negative.    NIPS negative XY    COVID: Fully vaccinated and s/p bivalent booster  Flu: Vaccinated  Tdap:  Recommended  2023 Rx    ?Sterilization: Tubal consents signed 2023    Anatomy US: 22 normal anatomy except limited views of heart and spine, follow up ordered. Posterior placenta.   FU US:  MFM Birch's transverse, 2 pounds 8 ounces EFW 65%.  Anterior placenta  FU US: 3/8 [facility] Estimated fetal weight is 4 pounds 3 ounces this represents the 31st percentile.  The AC is in the 55th percentile. Breech presentation anterior fundal placenta grade 1.  Four-chamber, left ventricular outflow tract, right ventricular outflow track views identified but are limited but appear normal.      PROBLEM LIST/PLAN:   Preeclampsia- admitted [facility] -urine PC ratio 0.36, mildly elevated blood pressures   biweekly nonstress test q Mon Thu   ultrasounds for growth- 2023 recommend FU MFM, message [email or web address] to assist w next US, pt left last MFM OV before FU could be scheduled   weekly office visits   Continue daily low-dose aspirin   check blood pressures at home.  2023 Rx given for BP cuff.  Return to labor and delivery for any systolics 155 or more diastolics 95 or more, or any HTN symptoms  Hx of GDM/prediabetes - A1C - 4.8, started QID blood sugar checks   No BS log today.  F \"all elevated more than 95, mostly 105\", 2hr PP \"usually normal\".  Recommend pt call w levels and may need tx and mgmt in [place].  Previous  -    vertical skin incision, op note shows high transverse  via midline vertical skin incision  Maternal obesity, pregravid BMI 52-continue ASA 81 mg   delivery - states delivered at 36w6d due to gestational hypertension  Pap smear with cellular " changes consistent with HSV-option of serum levels and/or Valtrex prophylaxis  Rubella nonimmune-vaccinate postpartum         Relevant Medications    Blood Glucose Monitoring Suppl (FreeStyle Lite) w/Device kit    Lancets (freestyle) lancets    glucose blood (FREESTYLE LITE) test strip    Other Relevant Orders    POC Urinalysis Dipstick (Completed)   Other Visit Diagnoses     Acute cough        Relevant Medications    guaiFENesin (Mucinex) 600 MG 12 hr tablet          Impression  1. Pregnancy at 35w3d  2. Fetal status reassuring.   3. Activity and Exercise discussed.    Plan  1.  Continue with twice weekly nonstress test, 1 will be performed at [facility] office on Mondays with follow-up with nonstress test on Thursdays in office.  2.  Mucinex to pharmacy for concerns of productive cough.  Infectious precautions discussed  3.  Severe preeclampsia precautions discussed with patient.  4.  Follow-up 1 week      Patient was counseled to the following pregnancy precautions:  • Decreased fetal movement, if concern for decreased fetal movement please perform fetal kick counts you are looking for 10 movements in 2 hours.  If concern for fetal movement and not meeting that criteria, please present to triage for evaluation.  • Contractions occurring every 5 minutes for over an hour, lasting 30 to 60 seconds and progressively causing more discomfort, please seek medical attention to rule out labor  • If you believe that your water is broken, place a sanitary pad.  If pad fills in short period of time i.e. less than 5 minutes, take off pad placed another pad.  If this is saturated please present for rule out rupture of membranes  • Vaginal bleeding can be normal in pregnancy, this usually takes a form of spotting.  If having heavier bleeding like a menstrual period please present for evaluation; especially in light of severe abdominal pain this could represent a placental abruption.  • Keep all scheduled appointments as  recommended.        Parth Power MD  03/29/2023

## 2023-04-07 ENCOUNTER — HOSPITAL ENCOUNTER (OUTPATIENT)
Facility: HOSPITAL | Age: 33
Discharge: HOME | End: 2023-04-07
Attending: OBSTETRICS & GYNECOLOGY | Admitting: OBSTETRICS & GYNECOLOGY
Payer: MEDICARE

## 2023-04-07 ENCOUNTER — ROUTINE PRENATAL (OUTPATIENT)
Dept: OBSTETRICS AND GYNECOLOGY | Facility: CLINIC | Age: 33
End: 2023-04-07
Payer: MEDICARE

## 2023-04-07 VITALS — BODY MASS INDEX: 56.15 KG/M2 | SYSTOLIC BLOOD PRESSURE: 132 MMHG | WEIGHT: 278 LBS | DIASTOLIC BLOOD PRESSURE: 78 MMHG

## 2023-04-07 VITALS — DIASTOLIC BLOOD PRESSURE: 78 MMHG | HEART RATE: 105 BPM | RESPIRATION RATE: 20 BRPM | SYSTOLIC BLOOD PRESSURE: 129 MMHG

## 2023-04-07 DIAGNOSIS — O09.93 HIGH-RISK PREGNANCY IN THIRD TRIMESTER: ICD-10-CM

## 2023-04-07 DIAGNOSIS — Z98.891 PREVIOUS CESAREAN SECTION: Primary | ICD-10-CM

## 2023-04-07 LAB
GLUCOSE UR STRIP-MCNC: NEGATIVE MG/DL
PROT UR STRIP-MCNC: ABNORMAL MG/DL

## 2023-04-07 PROCEDURE — 59025 FETAL NON-STRESS TEST: CPT | Performed by: OBSTETRICS & GYNECOLOGY

## 2023-04-07 PROCEDURE — 87653 STREP B DNA AMP PROBE: CPT | Performed by: STUDENT IN AN ORGANIZED HEALTH CARE EDUCATION/TRAINING PROGRAM

## 2023-04-07 PROCEDURE — 59025 FETAL NON-STRESS TEST: CPT

## 2023-04-07 RX ORDER — MISOPROSTOL 100 UG/1
800 TABLET ORAL AS NEEDED
Status: CANCELLED | OUTPATIENT
Start: 2023-04-07

## 2023-04-07 RX ORDER — OXYTOCIN 10 [USP'U]/ML
999 INJECTION, SOLUTION INTRAMUSCULAR; INTRAVENOUS ONCE
Status: CANCELLED | OUTPATIENT
Start: 2023-04-07 | End: 2023-04-07

## 2023-04-07 RX ORDER — LIDOCAINE HYDROCHLORIDE 10 MG/ML
5 INJECTION, SOLUTION EPIDURAL; INFILTRATION; INTRACAUDAL; PERINEURAL AS NEEDED
Status: CANCELLED | OUTPATIENT
Start: 2023-04-07

## 2023-04-07 RX ORDER — TRISODIUM CITRATE DIHYDRATE AND CITRIC ACID MONOHYDRATE 500; 334 MG/5ML; MG/5ML
30 SOLUTION ORAL ONCE
Status: CANCELLED | OUTPATIENT
Start: 2023-04-07 | End: 2023-04-07

## 2023-04-07 RX ORDER — SODIUM CHLORIDE 0.9 % (FLUSH) 0.9 %
10 SYRINGE (ML) INJECTION AS NEEDED
Status: CANCELLED | OUTPATIENT
Start: 2023-04-07

## 2023-04-07 RX ORDER — METHYLERGONOVINE MALEATE 0.2 MG/ML
200 INJECTION INTRAVENOUS ONCE AS NEEDED
Status: CANCELLED | OUTPATIENT
Start: 2023-04-07

## 2023-04-07 RX ORDER — FAMOTIDINE 10 MG/ML
20 INJECTION, SOLUTION INTRAVENOUS ONCE AS NEEDED
Status: CANCELLED | OUTPATIENT
Start: 2023-04-07

## 2023-04-07 RX ORDER — ONDANSETRON 2 MG/ML
4 INJECTION INTRAMUSCULAR; INTRAVENOUS EVERY 6 HOURS PRN
Status: CANCELLED | OUTPATIENT
Start: 2023-04-07

## 2023-04-07 RX ORDER — ACETAMINOPHEN 500 MG
1000 TABLET ORAL ONCE
Status: CANCELLED | OUTPATIENT
Start: 2023-04-07 | End: 2023-04-07

## 2023-04-07 RX ORDER — SODIUM CHLORIDE, SODIUM LACTATE, POTASSIUM CHLORIDE, CALCIUM CHLORIDE 600; 310; 30; 20 MG/100ML; MG/100ML; MG/100ML; MG/100ML
125 INJECTION, SOLUTION INTRAVENOUS CONTINUOUS
Status: CANCELLED | OUTPATIENT
Start: 2023-04-07

## 2023-04-07 RX ORDER — KETOROLAC TROMETHAMINE 15 MG/ML
30 INJECTION, SOLUTION INTRAMUSCULAR; INTRAVENOUS ONCE
Status: CANCELLED | OUTPATIENT
Start: 2023-04-07 | End: 2023-04-07

## 2023-04-07 RX ORDER — METOCLOPRAMIDE HYDROCHLORIDE 5 MG/ML
10 INJECTION INTRAMUSCULAR; INTRAVENOUS ONCE AS NEEDED
Status: CANCELLED | OUTPATIENT
Start: 2023-04-07

## 2023-04-07 RX ORDER — ONDANSETRON 4 MG/1
4 TABLET, FILM COATED ORAL EVERY 6 HOURS PRN
Status: CANCELLED | OUTPATIENT
Start: 2023-04-07

## 2023-04-07 RX ORDER — HYDROXYZINE HYDROCHLORIDE 10 MG/1
50 TABLET, FILM COATED ORAL NIGHTLY PRN
Status: CANCELLED | OUTPATIENT
Start: 2023-04-07

## 2023-04-07 RX ORDER — SODIUM CHLORIDE 0.9 % (FLUSH) 0.9 %
3 SYRINGE (ML) INJECTION EVERY 12 HOURS SCHEDULED
Status: CANCELLED | OUTPATIENT
Start: 2023-04-07

## 2023-04-07 RX ORDER — CARBOPROST TROMETHAMINE 250 UG/ML
250 INJECTION, SOLUTION INTRAMUSCULAR AS NEEDED
Status: CANCELLED | OUTPATIENT
Start: 2023-04-07

## 2023-04-07 RX ORDER — OXYTOCIN 10 [USP'U]/ML
250 INJECTION, SOLUTION INTRAMUSCULAR; INTRAVENOUS CONTINUOUS
Status: CANCELLED | OUTPATIENT
Start: 2023-04-07 | End: 2023-04-07

## 2023-04-07 RX ORDER — FAMOTIDINE 10 MG
20 TABLET ORAL ONCE AS NEEDED
Status: CANCELLED | OUTPATIENT
Start: 2023-04-07

## 2023-04-07 NOTE — NON STRESS TEST
Obstetrical Non-stress Test Interpretation     Name:  Rosie Bradley  MRN: [id number]    32 y.o. female  at 36w5d    Indication: hypertension       Fetal Movement: active  Fetal HR Assessment Method: external  Fetal HR (beats/min): 140  Fetal HR Baseline: normal range  Fetal HR Variability: moderate (amplitude range 6 to 25 bpm)  Fetal HR Accelerations: episodic, greater than/equal to 15 bpm, lasting at least 15 seconds  Fetal HR Decelerations: absent  Sinusoidal Pattern Present: absent    /78 (BP Location: Right arm)   Pulse 105   Resp 20   LMP 2022     Reason for test: Hypertension  Date of Test: 2023  Time frame of test: 5649-9632  RN NST Interpretation: Reactive      Shaila Hu RN  2023  12:57 EDT

## 2023-04-07 NOTE — PROGRESS NOTES
OB FOLLOW UP  Complaint   Chief Complaint   Patient presents with   • Routine Prenatal Visit            Rosie Bradley is a 32 y.o.  36w5d patient being seen today for her obstetrical follow up visit. Patient denies decreased fetal movement, contractions, loss of fluid or vaginal bleeding.  Patient denies any headache, visual disturbances, new onset nausea vomiting, right upper quadrant pain, or new onset swelling.  Was unable to make NST appointment this week secondary to transportation concerns on Monday and then with raining concerns on Thursday.  Able to go today.      Her prenatal care is complicated by (and status) :    Patient Active Problem List   Diagnosis   • High-risk pregnancy in third trimester   • Previous  section   • Maternal obesity affecting pregnancy, antepartum   • History of gestational hypertension   • History of gestational diabetes in prior pregnancy, currently pregnant   • Rubella non-immune status, antepartum   • HSV infection   • Unwanted fertility   • Preeclampsia   • Itching   • At risk for gestational diabetes mellitus       All other systems reviewed and are negative.     The additional following portions of the patient's history were reviewed and updated as appropriate: allergies, current medications, past family history, past medical history, past social history, past surgical history and problem list.      EXAM:     Vital signs: /78   Wt 126 kg (278 lb)   LMP 2022   BMI 56.15 kg/m²   Appearance/psychiatric: To be in no distress  Constitutional: The patient is well nourished.  Cardiovascular: She does not have edema.  Respiratory: Respiratory effort is normal.  Gastrointestinal: Abdomen is soft, gravid, nontender, no rashes, heart tones are present, fundal height is Unable to obtain secondary to body habitus    Pelvic Exam: Yes.  Presentation: cephalic. Dilation: Closed. Effacement: Long. Station: -3.    Urine glucose/protein: See prenatal flowsheet      "  Assessment and Plan    Problem List Items Addressed This Visit        Gravid and     High-risk pregnancy in third trimester    Overview     DUDLEY finalized: 23 per LMP, and by 21-week anatomy scan     CF previously negative.    NIPS negative XY    COVID: Fully vaccinated and s/p bivalent booster  Flu: Vaccinated  Tdap:  Recommended  2023 Rx    ?Sterilization: Tubal consents signed 2023    Anatomy US: 22 normal anatomy except limited views of heart and spine, follow up ordered. Posterior placenta.   FU US:  MFM Birch's transverse, 2 pounds 8 ounces EFW 65%.  Anterior placenta  FU US: 3/8 [facility] Estimated fetal weight is 4 pounds 3 ounces this represents the 31st percentile.  The AC is in the 55th percentile. Breech presentation anterior fundal placenta grade 1.  Four-chamber, left ventricular outflow tract, right ventricular outflow track views identified but are limited but appear normal.      PROBLEM LIST/PLAN:   Preeclampsia- admitted [facility] -urine PC ratio 0.36, mildly elevated blood pressures   biweekly nonstress test q Mon Thu   ultrasounds for growth- 2023 recommend FU MFM, message [employer] to assist w next US, pt left last MFM OV before FU could be scheduled   weekly office visits   Continue daily low-dose aspirin   check blood pressures at home.  2023 Rx given for BP cuff.  Return to labor and delivery for any systolics 155 or more diastolics 95 or more, or any HTN symptoms  Hx of GDM/prediabetes - A1C - 4.8, started QID blood sugar checks   No BS log today.  F \"all elevated more than 95, mostly 105\", 2hr PP \"usually normal\".  Recommend pt call w levels and may need tx and mgmt in [place].  Previous  -    vertical skin incision, op note shows high transverse  via midline vertical skin incision  Maternal obesity, pregravid BMI 52-continue ASA 81 mg   delivery - states delivered at 36w6d due to gestational hypertension  Pap smear with " cellular changes consistent with HSV-option of serum levels and/or Valtrex prophylaxis  Rubella nonimmune-vaccinate postpartum         Relevant Medications    Blood Glucose Monitoring Suppl (FreeStyle Lite) w/Device kit    Lancets (freestyle) lancets    glucose blood (FREESTYLE LITE) test strip    Other Relevant Orders    Group B Strep (Molecular) - Swab, Vaginal/Rectum    POC Urinalysis Dipstick (Completed)    Previous  section - Primary    Overview     Patient with vertical skin incision, will request op report  22 Op report reviewed - primary high transverse  section            Impression  1. Pregnancy at 36w5d  2. Fetal status reassuring.   3. Activity and Exercise discussed.    Plan  1.  GBS collected today  2.   section orders placed  3.  Surgery scheduled for this coming Monday, follow-up in 2 weeks for blood pressure check.  4.  Preeclampsia precautions and blood pressure parameters discussed.      Patient was counseled to the following pregnancy precautions:  • Decreased fetal movement, if concern for decreased fetal movement please perform fetal kick counts you are looking for 10 movements in 2 hours.  If concern for fetal movement and not meeting that criteria, please present to triage for evaluation.  • Contractions occurring every 5 minutes for over an hour, lasting 30 to 60 seconds and progressively causing more discomfort, please seek medical attention to rule out labor  • If you believe that your water is broken, place a sanitary pad.  If pad fills in short period of time i.e. less than 5 minutes, take off pad placed another pad.  If this is saturated please present for rule out rupture of membranes  • Vaginal bleeding can be normal in pregnancy, this usually takes a form of spotting.  If having heavier bleeding like a menstrual period please present for evaluation; especially in light of severe abdominal pain this could represent a placental abruption.  • Keep all  scheduled appointments as recommended.        Parth Power MD  04/07/2023

## 2023-04-08 LAB — GROUP B STREP, DNA: POSITIVE

## 2023-04-08 NOTE — SIGNIFICANT NOTE
04/07/23 2771   Nonstress Test   Reason for NST Hypertension   Acoustic Stimulator No   Uterine Irritability No   Contractions Not present   Fetal Assessment   Fetal Movement active   Fetal HR Assessment Method external   Fetal HR (beats/min) 140  (140's)   Fetal HR Baseline normal range   Fetal HR Variability moderate (amplitude range 6 to 25 bpm)   Fetal HR Accelerations episodic;greater than/equal to 15 bpm;lasting at least 15 seconds   Fetal HR Decelerations absent   Sinusoidal Pattern Present absent   Fetal HR Tracing Category Category I   Interpretation A   Nonstress Test Interpretation A Reactive

## 2023-04-10 ENCOUNTER — ANESTHESIA (OUTPATIENT)
Dept: LABOR AND DELIVERY | Facility: HOSPITAL | Age: 33
End: 2023-04-10
Payer: MEDICARE

## 2023-04-10 ENCOUNTER — HOSPITAL ENCOUNTER (INPATIENT)
Facility: HOSPITAL | Age: 33
LOS: 3 days | Discharge: HOME | End: 2023-04-13
Attending: STUDENT IN AN ORGANIZED HEALTH CARE EDUCATION/TRAINING PROGRAM | Admitting: STUDENT IN AN ORGANIZED HEALTH CARE EDUCATION/TRAINING PROGRAM
Payer: MEDICARE

## 2023-04-10 ENCOUNTER — PATIENT OUTREACH (OUTPATIENT)
Dept: LABOR AND DELIVERY | Facility: HOSPITAL | Age: 33
End: 2023-04-10
Payer: MEDICARE

## 2023-04-10 DIAGNOSIS — O09.93 HIGH-RISK PREGNANCY IN THIRD TRIMESTER: ICD-10-CM

## 2023-04-10 DIAGNOSIS — Z98.891 PREVIOUS CESAREAN SECTION: ICD-10-CM

## 2023-04-10 LAB
ABO GROUP BLD: NORMAL
ALBUMIN SERPL-MCNC: 3.5 G/DL (ref 3.5–5.2)
ALBUMIN/GLOB SERPL: 1 G/DL
ALP SERPL-CCNC: 136 U/L (ref 39–117)
ALT SERPL W P-5'-P-CCNC: 6 U/L (ref 1–33)
AMPHET+METHAMPHET UR QL: NEGATIVE
ANION GAP SERPL CALCULATED.3IONS-SCNC: 11.6 MMOL/L (ref 5–15)
AST SERPL-CCNC: 12 U/L (ref 1–32)
BARBITURATES UR QL SCN: NEGATIVE
BENZODIAZ UR QL SCN: NEGATIVE
BILIRUB SERPL-MCNC: 0.2 MG/DL (ref 0–1.2)
BLD GP AB SCN SERPL QL: NEGATIVE
BUN SERPL-MCNC: 9 MG/DL (ref 6–20)
BUN/CREAT SERPL: 21.4 (ref 7–25)
CALCIUM SPEC-SCNC: 9 MG/DL (ref 8.6–10.5)
CANNABINOIDS SERPL QL: NEGATIVE
CHLORIDE SERPL-SCNC: 101 MMOL/L (ref 98–107)
CO2 SERPL-SCNC: 21.4 MMOL/L (ref 22–29)
COCAINE UR QL: NEGATIVE
CREAT SERPL-MCNC: 0.42 MG/DL (ref 0.57–1)
CREAT UR-MCNC: 108 MG/DL
DEPRECATED RDW RBC AUTO: 45.3 FL (ref 37–54)
EGFRCR SERPLBLD CKD-EPI 2021: 131.8 ML/MIN/1.73
ERYTHROCYTE [DISTWIDTH] IN BLOOD BY AUTOMATED COUNT: 14.1 % (ref 12.3–15.4)
GLOBULIN UR ELPH-MCNC: 3.6 GM/DL
GLUCOSE SERPL-MCNC: 78 MG/DL (ref 65–99)
HCT VFR BLD AUTO: 34.9 % (ref 34–46.6)
HGB BLD-MCNC: 11.9 G/DL (ref 12–15.9)
MCH RBC QN AUTO: 30.2 PG (ref 26.6–33)
MCHC RBC AUTO-ENTMCNC: 34.1 G/DL (ref 31.5–35.7)
MCV RBC AUTO: 88.6 FL (ref 79–97)
METHADONE UR QL SCN: NEGATIVE
OPIATES UR QL: NEGATIVE
OXYCODONE UR QL SCN: NEGATIVE
PLATELET # BLD AUTO: 180 10*3/MM3 (ref 140–450)
PMV BLD AUTO: 10.2 FL (ref 6–12)
POTASSIUM SERPL-SCNC: 3.5 MMOL/L (ref 3.5–5.2)
PROT ?TM UR-MCNC: 39.5 MG/DL
PROT SERPL-MCNC: 7.1 G/DL (ref 6–8.5)
PROT/CREAT UR: 0.37 MG/G{CREAT}
RBC # BLD AUTO: 3.94 10*6/MM3 (ref 3.77–5.28)
RH BLD: POSITIVE
SODIUM SERPL-SCNC: 134 MMOL/L (ref 136–145)
T&S EXPIRATION DATE: NORMAL
WBC NRBC COR # BLD: 11.44 10*3/MM3 (ref 3.4–10.8)

## 2023-04-10 PROCEDURE — 80053 COMPREHEN METABOLIC PANEL: CPT | Performed by: STUDENT IN AN ORGANIZED HEALTH CARE EDUCATION/TRAINING PROGRAM

## 2023-04-10 PROCEDURE — 25010000002 PROPOFOL 10 MG/ML EMULSION: Performed by: NURSE ANESTHETIST, CERTIFIED REGISTERED

## 2023-04-10 PROCEDURE — 25010000002 DEXAMETHASONE PER 1 MG: Performed by: NURSE ANESTHETIST, CERTIFIED REGISTERED

## 2023-04-10 PROCEDURE — 25010000002 CEFAZOLIN PER 500 MG: Performed by: NURSE ANESTHETIST, CERTIFIED REGISTERED

## 2023-04-10 PROCEDURE — 80307 DRUG TEST PRSMV CHEM ANLYZR: CPT | Performed by: STUDENT IN AN ORGANIZED HEALTH CARE EDUCATION/TRAINING PROGRAM

## 2023-04-10 PROCEDURE — 86850 RBC ANTIBODY SCREEN: CPT | Performed by: STUDENT IN AN ORGANIZED HEALTH CARE EDUCATION/TRAINING PROGRAM

## 2023-04-10 PROCEDURE — 25010000002 OXYTOCIN PER 10 UNITS: Performed by: NURSE ANESTHETIST, CERTIFIED REGISTERED

## 2023-04-10 PROCEDURE — 51703 INSERT BLADDER CATH COMPLEX: CPT

## 2023-04-10 PROCEDURE — 82570 ASSAY OF URINE CREATININE: CPT | Performed by: STUDENT IN AN ORGANIZED HEALTH CARE EDUCATION/TRAINING PROGRAM

## 2023-04-10 PROCEDURE — 25010000002 FENTANYL CITRATE (PF) 50 MCG/ML SOLUTION: Performed by: ANESTHESIOLOGY

## 2023-04-10 PROCEDURE — 88302 TISSUE EXAM BY PATHOLOGIST: CPT | Performed by: STUDENT IN AN ORGANIZED HEALTH CARE EDUCATION/TRAINING PROGRAM

## 2023-04-10 PROCEDURE — 25010000002 ONDANSETRON PER 1 MG: Performed by: STUDENT IN AN ORGANIZED HEALTH CARE EDUCATION/TRAINING PROGRAM

## 2023-04-10 PROCEDURE — 25010000002 PROCHLORPERAZINE 10 MG/2ML SOLUTION: Performed by: ANESTHESIOLOGY

## 2023-04-10 PROCEDURE — 25010000002 CEFAZOLIN PER 500 MG: Performed by: STUDENT IN AN ORGANIZED HEALTH CARE EDUCATION/TRAINING PROGRAM

## 2023-04-10 PROCEDURE — 0UB70ZZ EXCISION OF BILATERAL FALLOPIAN TUBES, OPEN APPROACH: ICD-10-PCS | Performed by: STUDENT IN AN ORGANIZED HEALTH CARE EDUCATION/TRAINING PROGRAM

## 2023-04-10 PROCEDURE — 86901 BLOOD TYPING SEROLOGIC RH(D): CPT | Performed by: STUDENT IN AN ORGANIZED HEALTH CARE EDUCATION/TRAINING PROGRAM

## 2023-04-10 PROCEDURE — 84156 ASSAY OF PROTEIN URINE: CPT | Performed by: STUDENT IN AN ORGANIZED HEALTH CARE EDUCATION/TRAINING PROGRAM

## 2023-04-10 PROCEDURE — 25010000002 ONDANSETRON PER 1 MG: Performed by: NURSE ANESTHETIST, CERTIFIED REGISTERED

## 2023-04-10 PROCEDURE — 0 MORPHINE PER 10 MG: Performed by: ANESTHESIOLOGY

## 2023-04-10 PROCEDURE — 86900 BLOOD TYPING SEROLOGIC ABO: CPT | Performed by: STUDENT IN AN ORGANIZED HEALTH CARE EDUCATION/TRAINING PROGRAM

## 2023-04-10 PROCEDURE — 85027 COMPLETE CBC AUTOMATED: CPT | Performed by: STUDENT IN AN ORGANIZED HEALTH CARE EDUCATION/TRAINING PROGRAM

## 2023-04-10 PROCEDURE — 25010000002 KETOROLAC TROMETHAMINE PER 15 MG: Performed by: STUDENT IN AN ORGANIZED HEALTH CARE EDUCATION/TRAINING PROGRAM

## 2023-04-10 PROCEDURE — 25010000002 METOCLOPRAMIDE PER 10 MG: Performed by: STUDENT IN AN ORGANIZED HEALTH CARE EDUCATION/TRAINING PROGRAM

## 2023-04-10 RX ORDER — TRISODIUM CITRATE DIHYDRATE AND CITRIC ACID MONOHYDRATE 500; 334 MG/5ML; MG/5ML
30 SOLUTION ORAL ONCE
Status: COMPLETED | OUTPATIENT
Start: 2023-04-10 | End: 2023-04-10

## 2023-04-10 RX ORDER — PRENATAL VIT/IRON FUM/FOLIC AC 27MG-0.8MG
1 TABLET ORAL DAILY
Status: DISCONTINUED | OUTPATIENT
Start: 2023-04-10 | End: 2023-04-10

## 2023-04-10 RX ORDER — OXYCODONE HYDROCHLORIDE 5 MG/1
5 TABLET ORAL EVERY 4 HOURS PRN
Status: DISCONTINUED | OUTPATIENT
Start: 2023-04-10 | End: 2023-04-13 | Stop reason: HOSPADM

## 2023-04-10 RX ORDER — BUPIVACAINE HYDROCHLORIDE 7.5 MG/ML
INJECTION, SOLUTION EPIDURAL; RETROBULBAR
Status: COMPLETED | OUTPATIENT
Start: 2023-04-10 | End: 2023-04-10

## 2023-04-10 RX ORDER — PROCHLORPERAZINE EDISYLATE 5 MG/ML
10 INJECTION INTRAMUSCULAR; INTRAVENOUS ONCE
Status: COMPLETED | OUTPATIENT
Start: 2023-04-10 | End: 2023-04-10

## 2023-04-10 RX ORDER — METOCLOPRAMIDE HYDROCHLORIDE 5 MG/ML
10 INJECTION INTRAMUSCULAR; INTRAVENOUS ONCE AS NEEDED
Status: COMPLETED | OUTPATIENT
Start: 2023-04-10 | End: 2023-04-10

## 2023-04-10 RX ORDER — OXYTOCIN 10 [USP'U]/ML
INJECTION, SOLUTION INTRAMUSCULAR; INTRAVENOUS AS NEEDED
Status: DISCONTINUED | OUTPATIENT
Start: 2023-04-10 | End: 2023-04-10 | Stop reason: SURG

## 2023-04-10 RX ORDER — CEFAZOLIN SODIUM 1 G/3ML
INJECTION, POWDER, FOR SOLUTION INTRAMUSCULAR; INTRAVENOUS AS NEEDED
Status: DISCONTINUED | OUTPATIENT
Start: 2023-04-10 | End: 2023-04-10 | Stop reason: SURG

## 2023-04-10 RX ORDER — DIPHENHYDRAMINE HYDROCHLORIDE 50 MG/ML
12.5 INJECTION INTRAMUSCULAR; INTRAVENOUS EVERY 6 HOURS PRN
Status: ACTIVE | OUTPATIENT
Start: 2023-04-10 | End: 2023-04-11

## 2023-04-10 RX ORDER — ONDANSETRON 2 MG/ML
4 INJECTION INTRAMUSCULAR; INTRAVENOUS EVERY 6 HOURS PRN
Status: DISCONTINUED | OUTPATIENT
Start: 2023-04-10 | End: 2023-04-10

## 2023-04-10 RX ORDER — MISOPROSTOL 200 UG/1
200 TABLET ORAL AS NEEDED
Status: DISCONTINUED | OUTPATIENT
Start: 2023-04-10 | End: 2023-04-13 | Stop reason: HOSPADM

## 2023-04-10 RX ORDER — LIDOCAINE HYDROCHLORIDE 10 MG/ML
5 INJECTION, SOLUTION EPIDURAL; INFILTRATION; INTRACAUDAL; PERINEURAL AS NEEDED
Status: DISCONTINUED | OUTPATIENT
Start: 2023-04-10 | End: 2023-04-10 | Stop reason: HOSPADM

## 2023-04-10 RX ORDER — SODIUM CHLORIDE 0.9 % (FLUSH) 0.9 %
10 SYRINGE (ML) INJECTION AS NEEDED
Status: DISCONTINUED | OUTPATIENT
Start: 2023-04-10 | End: 2023-04-10 | Stop reason: HOSPADM

## 2023-04-10 RX ORDER — ACETAMINOPHEN 500 MG
1000 TABLET ORAL EVERY 6 HOURS
Status: DISPENSED | OUTPATIENT
Start: 2023-04-10 | End: 2023-04-11

## 2023-04-10 RX ORDER — MISOPROSTOL 200 UG/1
800 TABLET ORAL AS NEEDED
Status: DISCONTINUED | OUTPATIENT
Start: 2023-04-10 | End: 2023-04-10 | Stop reason: HOSPADM

## 2023-04-10 RX ORDER — DIPHENHYDRAMINE HCL 25 MG
25 CAPSULE ORAL EVERY 6 HOURS PRN
Status: DISPENSED | OUTPATIENT
Start: 2023-04-10 | End: 2023-04-11

## 2023-04-10 RX ORDER — CALCIUM CARBONATE 200(500)MG
1 TABLET,CHEWABLE ORAL EVERY 4 HOURS PRN
Status: DISCONTINUED | OUTPATIENT
Start: 2023-04-10 | End: 2023-04-13 | Stop reason: HOSPADM

## 2023-04-10 RX ORDER — ACETAMINOPHEN 500 MG
1000 TABLET ORAL ONCE
Status: COMPLETED | OUTPATIENT
Start: 2023-04-10 | End: 2023-04-10

## 2023-04-10 RX ORDER — FAMOTIDINE 10 MG/ML
20 INJECTION, SOLUTION INTRAVENOUS ONCE AS NEEDED
Status: COMPLETED | OUTPATIENT
Start: 2023-04-10 | End: 2023-04-10

## 2023-04-10 RX ORDER — SODIUM CHLORIDE 0.9 % (FLUSH) 0.9 %
10 SYRINGE (ML) INJECTION AS NEEDED
Status: DISCONTINUED | OUTPATIENT
Start: 2023-04-10 | End: 2023-04-10

## 2023-04-10 RX ORDER — LIDOCAINE HYDROCHLORIDE 10 MG/ML
5 INJECTION, SOLUTION EPIDURAL; INFILTRATION; INTRACAUDAL; PERINEURAL AS NEEDED
Status: DISCONTINUED | OUTPATIENT
Start: 2023-04-10 | End: 2023-04-10

## 2023-04-10 RX ORDER — FENTANYL CITRATE 50 UG/ML
INJECTION, SOLUTION INTRAMUSCULAR; INTRAVENOUS AS NEEDED
Status: DISCONTINUED | OUTPATIENT
Start: 2023-04-10 | End: 2023-04-10 | Stop reason: SURG

## 2023-04-10 RX ORDER — KETOROLAC TROMETHAMINE 30 MG/ML
30 INJECTION, SOLUTION INTRAMUSCULAR; INTRAVENOUS ONCE
Status: COMPLETED | OUTPATIENT
Start: 2023-04-10 | End: 2023-04-10

## 2023-04-10 RX ORDER — PHENYLEPHRINE HCL IN 0.9% NACL 1 MG/10 ML
SYRINGE (ML) INTRAVENOUS AS NEEDED
Status: DISCONTINUED | OUTPATIENT
Start: 2023-04-10 | End: 2023-04-10 | Stop reason: SURG

## 2023-04-10 RX ORDER — OXYTOCIN/0.9 % SODIUM CHLORIDE 30/500 ML
250 PLASTIC BAG, INJECTION (ML) INTRAVENOUS CONTINUOUS
Status: ACTIVE | OUTPATIENT
Start: 2023-04-10 | End: 2023-04-10

## 2023-04-10 RX ORDER — DEXMEDETOMIDINE HYDROCHLORIDE 100 UG/ML
INJECTION, SOLUTION INTRAVENOUS AS NEEDED
Status: DISCONTINUED | OUTPATIENT
Start: 2023-04-10 | End: 2023-04-10 | Stop reason: SURG

## 2023-04-10 RX ORDER — IBUPROFEN 600 MG/1
600 TABLET ORAL EVERY 6 HOURS
Status: DISCONTINUED | OUTPATIENT
Start: 2023-04-11 | End: 2023-04-13 | Stop reason: HOSPADM

## 2023-04-10 RX ORDER — PROPOFOL 10 MG/ML
VIAL (ML) INTRAVENOUS AS NEEDED
Status: DISCONTINUED | OUTPATIENT
Start: 2023-04-10 | End: 2023-04-10 | Stop reason: SURG

## 2023-04-10 RX ORDER — HYDROXYZINE HYDROCHLORIDE 25 MG/1
50 TABLET, FILM COATED ORAL NIGHTLY PRN
Status: DISCONTINUED | OUTPATIENT
Start: 2023-04-10 | End: 2023-04-10

## 2023-04-10 RX ORDER — NALOXONE HCL 0.4 MG/ML
0.4 VIAL (ML) INJECTION ONCE AS NEEDED
Status: ACTIVE | OUTPATIENT
Start: 2023-04-10 | End: 2023-04-11

## 2023-04-10 RX ORDER — ACETAMINOPHEN 500 MG
1000 TABLET ORAL EVERY 6 HOURS
Status: DISCONTINUED | OUTPATIENT
Start: 2023-04-11 | End: 2023-04-13 | Stop reason: HOSPADM

## 2023-04-10 RX ORDER — ONDANSETRON 2 MG/ML
4 INJECTION INTRAMUSCULAR; INTRAVENOUS EVERY 6 HOURS PRN
Status: DISCONTINUED | OUTPATIENT
Start: 2023-04-10 | End: 2023-04-13 | Stop reason: HOSPADM

## 2023-04-10 RX ORDER — OXYTOCIN/0.9 % SODIUM CHLORIDE 30/500 ML
999 PLASTIC BAG, INJECTION (ML) INTRAVENOUS ONCE
Status: COMPLETED | OUTPATIENT
Start: 2023-04-10 | End: 2023-04-10

## 2023-04-10 RX ORDER — FAMOTIDINE 20 MG/1
20 TABLET, FILM COATED ORAL ONCE AS NEEDED
Status: DISCONTINUED | OUTPATIENT
Start: 2023-04-10 | End: 2023-04-10 | Stop reason: HOSPADM

## 2023-04-10 RX ORDER — MORPHINE SULFATE 0.5 MG/ML
INJECTION, SOLUTION EPIDURAL; INTRATHECAL; INTRAVENOUS
Status: COMPLETED | OUTPATIENT
Start: 2023-04-10 | End: 2023-04-10

## 2023-04-10 RX ORDER — CARBOPROST TROMETHAMINE 250 UG/ML
250 INJECTION, SOLUTION INTRAMUSCULAR AS NEEDED
Status: DISCONTINUED | OUTPATIENT
Start: 2023-04-10 | End: 2023-04-13 | Stop reason: HOSPADM

## 2023-04-10 RX ORDER — HYDROMORPHONE HCL 110MG/55ML
0.25 PATIENT CONTROLLED ANALGESIA SYRINGE INTRAVENOUS
Status: DISCONTINUED | OUTPATIENT
Start: 2023-04-10 | End: 2023-04-13 | Stop reason: HOSPADM

## 2023-04-10 RX ORDER — HYDROXYZINE HYDROCHLORIDE 25 MG/1
50 TABLET, FILM COATED ORAL EVERY 6 HOURS PRN
Status: DISCONTINUED | OUTPATIENT
Start: 2023-04-10 | End: 2023-04-13 | Stop reason: HOSPADM

## 2023-04-10 RX ORDER — DOCUSATE SODIUM 100 MG/1
100 CAPSULE, LIQUID FILLED ORAL DAILY
Status: DISCONTINUED | OUTPATIENT
Start: 2023-04-10 | End: 2023-04-13 | Stop reason: HOSPADM

## 2023-04-10 RX ORDER — CARBOPROST TROMETHAMINE 250 UG/ML
250 INJECTION, SOLUTION INTRAMUSCULAR AS NEEDED
Status: DISCONTINUED | OUTPATIENT
Start: 2023-04-10 | End: 2023-04-10 | Stop reason: HOSPADM

## 2023-04-10 RX ORDER — SIMETHICONE 80 MG
80 TABLET,CHEWABLE ORAL 4 TIMES DAILY PRN
Status: DISCONTINUED | OUTPATIENT
Start: 2023-04-10 | End: 2023-04-13 | Stop reason: HOSPADM

## 2023-04-10 RX ORDER — KETOROLAC TROMETHAMINE 30 MG/ML
15 INJECTION, SOLUTION INTRAMUSCULAR; INTRAVENOUS EVERY 6 HOURS
Status: COMPLETED | OUTPATIENT
Start: 2023-04-10 | End: 2023-04-11

## 2023-04-10 RX ORDER — HYDROCORTISONE 25 MG/G
CREAM TOPICAL 3 TIMES DAILY PRN
Status: DISCONTINUED | OUTPATIENT
Start: 2023-04-10 | End: 2023-04-13 | Stop reason: HOSPADM

## 2023-04-10 RX ORDER — ONDANSETRON 2 MG/ML
INJECTION INTRAMUSCULAR; INTRAVENOUS AS NEEDED
Status: DISCONTINUED | OUTPATIENT
Start: 2023-04-10 | End: 2023-04-10 | Stop reason: SURG

## 2023-04-10 RX ORDER — ONDANSETRON 4 MG/1
4 TABLET, FILM COATED ORAL EVERY 6 HOURS PRN
Status: DISCONTINUED | OUTPATIENT
Start: 2023-04-10 | End: 2023-04-13 | Stop reason: HOSPADM

## 2023-04-10 RX ORDER — DEXAMETHASONE SODIUM PHOSPHATE 4 MG/ML
INJECTION, SOLUTION INTRA-ARTICULAR; INTRALESIONAL; INTRAMUSCULAR; INTRAVENOUS; SOFT TISSUE AS NEEDED
Status: DISCONTINUED | OUTPATIENT
Start: 2023-04-10 | End: 2023-04-10 | Stop reason: SURG

## 2023-04-10 RX ORDER — METHYLERGONOVINE MALEATE 0.2 MG/ML
200 INJECTION INTRAVENOUS ONCE AS NEEDED
Status: DISCONTINUED | OUTPATIENT
Start: 2023-04-10 | End: 2023-04-10 | Stop reason: HOSPADM

## 2023-04-10 RX ORDER — SODIUM CHLORIDE 0.9 % (FLUSH) 0.9 %
3 SYRINGE (ML) INJECTION EVERY 12 HOURS SCHEDULED
Status: DISCONTINUED | OUTPATIENT
Start: 2023-04-10 | End: 2023-04-10 | Stop reason: HOSPADM

## 2023-04-10 RX ORDER — METHYLERGONOVINE MALEATE 0.2 MG/ML
200 INJECTION INTRAVENOUS ONCE AS NEEDED
Status: DISCONTINUED | OUTPATIENT
Start: 2023-04-10 | End: 2023-04-13 | Stop reason: HOSPADM

## 2023-04-10 RX ORDER — ONDANSETRON 4 MG/1
4 TABLET, FILM COATED ORAL EVERY 6 HOURS PRN
Status: DISCONTINUED | OUTPATIENT
Start: 2023-04-10 | End: 2023-04-10

## 2023-04-10 RX ORDER — MISOPROSTOL 100 UG/1
TABLET ORAL AS NEEDED
Status: DISCONTINUED | OUTPATIENT
Start: 2023-04-10 | End: 2023-04-13 | Stop reason: HOSPADM

## 2023-04-10 RX ORDER — HYDROXYZINE HYDROCHLORIDE 25 MG/1
50 TABLET, FILM COATED ORAL NIGHTLY PRN
Status: DISCONTINUED | OUTPATIENT
Start: 2023-04-10 | End: 2023-04-13 | Stop reason: HOSPADM

## 2023-04-10 RX ORDER — FAMOTIDINE 10 MG/ML
20 INJECTION, SOLUTION INTRAVENOUS ONCE AS NEEDED
Status: DISCONTINUED | OUTPATIENT
Start: 2023-04-10 | End: 2023-04-10 | Stop reason: HOSPADM

## 2023-04-10 RX ORDER — FENTANYL CITRATE 50 UG/ML
INJECTION, SOLUTION INTRAMUSCULAR; INTRAVENOUS
Status: COMPLETED | OUTPATIENT
Start: 2023-04-10 | End: 2023-04-10

## 2023-04-10 RX ORDER — SODIUM CHLORIDE 0.9 % (FLUSH) 0.9 %
3 SYRINGE (ML) INJECTION EVERY 12 HOURS SCHEDULED
Status: DISCONTINUED | OUTPATIENT
Start: 2023-04-10 | End: 2023-04-10

## 2023-04-10 RX ORDER — TRANEXAMIC ACID 100 MG/ML
INJECTION, SOLUTION INTRAVENOUS AS NEEDED
Status: DISCONTINUED | OUTPATIENT
Start: 2023-04-10 | End: 2023-04-10 | Stop reason: SURG

## 2023-04-10 RX ORDER — ALUMINA, MAGNESIA, AND SIMETHICONE 2400; 2400; 240 MG/30ML; MG/30ML; MG/30ML
15 SUSPENSION ORAL EVERY 4 HOURS PRN
Status: DISCONTINUED | OUTPATIENT
Start: 2023-04-10 | End: 2023-04-13 | Stop reason: HOSPADM

## 2023-04-10 RX ORDER — PRENATAL VIT/IRON FUM/FOLIC AC 27MG-0.8MG
1 TABLET ORAL DAILY
Status: DISCONTINUED | OUTPATIENT
Start: 2023-04-10 | End: 2023-04-13 | Stop reason: HOSPADM

## 2023-04-10 RX ORDER — MORPHINE SULFATE 0.5 MG/ML
INJECTION, SOLUTION EPIDURAL; INTRATHECAL; INTRAVENOUS AS NEEDED
Status: DISCONTINUED | OUTPATIENT
Start: 2023-04-10 | End: 2023-04-10 | Stop reason: SURG

## 2023-04-10 RX ORDER — SODIUM CHLORIDE, SODIUM LACTATE, POTASSIUM CHLORIDE, CALCIUM CHLORIDE 600; 310; 30; 20 MG/100ML; MG/100ML; MG/100ML; MG/100ML
125 INJECTION, SOLUTION INTRAVENOUS CONTINUOUS
Status: DISCONTINUED | OUTPATIENT
Start: 2023-04-10 | End: 2023-04-10

## 2023-04-10 RX ORDER — CEFAZOLIN SODIUM IN 0.9 % NACL 3 G/100 ML
3 INTRAVENOUS SOLUTION, PIGGYBACK (ML) INTRAVENOUS ONCE
Status: COMPLETED | OUTPATIENT
Start: 2023-04-10 | End: 2023-04-10

## 2023-04-10 RX ORDER — KETAMINE HCL IN NACL, ISO-OSM 100MG/10ML
SYRINGE (ML) INJECTION AS NEEDED
Status: DISCONTINUED | OUTPATIENT
Start: 2023-04-10 | End: 2023-04-10 | Stop reason: SURG

## 2023-04-10 RX ADMIN — PROPOFOL 20 MG: 10 INJECTION, EMULSION INTRAVENOUS at 14:35

## 2023-04-10 RX ADMIN — CEFAZOLIN SODIUM 3 G: 1 INJECTION, POWDER, FOR SOLUTION INTRAMUSCULAR; INTRAVENOUS at 13:42

## 2023-04-10 RX ADMIN — SODIUM CHLORIDE, POTASSIUM CHLORIDE, SODIUM LACTATE AND CALCIUM CHLORIDE 125 ML/HR: 600; 310; 30; 20 INJECTION, SOLUTION INTRAVENOUS at 11:03

## 2023-04-10 RX ADMIN — METOCLOPRAMIDE HYDROCHLORIDE 10 MG: 5 INJECTION INTRAMUSCULAR; INTRAVENOUS at 13:35

## 2023-04-10 RX ADMIN — ONDANSETRON 4 MG: 2 INJECTION INTRAMUSCULAR; INTRAVENOUS at 13:33

## 2023-04-10 RX ADMIN — BUPIVACAINE HYDROCHLORIDE 1 ML: 7.5 INJECTION, SOLUTION EPIDURAL; RETROBULBAR at 13:50

## 2023-04-10 RX ADMIN — ACETAMINOPHEN 1000 MG: 500 TABLET ORAL at 13:34

## 2023-04-10 RX ADMIN — PROCHLORPERAZINE EDISYLATE 10 MG: 5 INJECTION INTRAMUSCULAR; INTRAVENOUS at 18:06

## 2023-04-10 RX ADMIN — PROPOFOL 20 MG: 10 INJECTION, EMULSION INTRAVENOUS at 14:27

## 2023-04-10 RX ADMIN — FENTANYL CITRATE 80 MCG: 50 INJECTION, SOLUTION INTRAMUSCULAR; INTRAVENOUS at 14:21

## 2023-04-10 RX ADMIN — Medication 999 ML/HR: at 15:54

## 2023-04-10 RX ADMIN — Medication 10 MG: at 14:46

## 2023-04-10 RX ADMIN — PROPOFOL 20 MG: 10 INJECTION, EMULSION INTRAVENOUS at 14:29

## 2023-04-10 RX ADMIN — PROPOFOL 40 MG: 10 INJECTION, EMULSION INTRAVENOUS at 14:22

## 2023-04-10 RX ADMIN — CEFAZOLIN SODIUM 3 G: 10 INJECTION, POWDER, FOR SOLUTION INTRAVENOUS at 13:35

## 2023-04-10 RX ADMIN — DEXMEDETOMIDINE HYDROCHLORIDE 20 MCG: 100 INJECTION, SOLUTION, CONCENTRATE INTRAVENOUS at 15:12

## 2023-04-10 RX ADMIN — PROPOFOL 40 MG: 10 INJECTION, EMULSION INTRAVENOUS at 14:41

## 2023-04-10 RX ADMIN — SODIUM CHLORIDE, POTASSIUM CHLORIDE, SODIUM LACTATE AND CALCIUM CHLORIDE 125 ML/HR: 600; 310; 30; 20 INJECTION, SOLUTION INTRAVENOUS at 11:54

## 2023-04-10 RX ADMIN — PROPOFOL 40 MG: 10 INJECTION, EMULSION INTRAVENOUS at 14:31

## 2023-04-10 RX ADMIN — FENTANYL CITRATE 20 MCG: 50 INJECTION, SOLUTION INTRAMUSCULAR; INTRAVENOUS at 13:50

## 2023-04-10 RX ADMIN — MORPHINE SULFATE 4.8 MG: 0.5 INJECTION, SOLUTION EPIDURAL; INTRATHECAL; INTRAVENOUS at 14:21

## 2023-04-10 RX ADMIN — Medication 10 MG: at 14:49

## 2023-04-10 RX ADMIN — FAMOTIDINE 20 MG: 10 INJECTION INTRAVENOUS at 13:35

## 2023-04-10 RX ADMIN — SODIUM CHLORIDE, POTASSIUM CHLORIDE, SODIUM LACTATE AND CALCIUM CHLORIDE 125 ML/HR: 600; 310; 30; 20 INJECTION, SOLUTION INTRAVENOUS at 17:56

## 2023-04-10 RX ADMIN — PROPOFOL 20 MG: 10 INJECTION, EMULSION INTRAVENOUS at 14:37

## 2023-04-10 RX ADMIN — SODIUM CITRATE AND CITRIC ACID MONOHYDRATE 30 ML: 500; 334 SOLUTION ORAL at 13:35

## 2023-04-10 RX ADMIN — Medication 200 MCG: at 14:01

## 2023-04-10 RX ADMIN — KETOROLAC TROMETHAMINE 30 MG: 30 INJECTION, SOLUTION INTRAMUSCULAR; INTRAVENOUS at 16:55

## 2023-04-10 RX ADMIN — ONDANSETRON 4 MG: 2 INJECTION INTRAMUSCULAR; INTRAVENOUS at 16:47

## 2023-04-10 RX ADMIN — DEXAMETHASONE SODIUM PHOSPHATE 4 MG: 4 INJECTION, SOLUTION INTRA-ARTICULAR; INTRALESIONAL; INTRAMUSCULAR; INTRAVENOUS; SOFT TISSUE at 14:19

## 2023-04-10 RX ADMIN — TRANEXAMIC ACID 1000 MG: 100 INJECTION, SOLUTION INTRAVENOUS at 14:56

## 2023-04-10 RX ADMIN — KETOROLAC TROMETHAMINE 15 MG: 30 INJECTION, SOLUTION INTRAMUSCULAR; INTRAVENOUS at 23:06

## 2023-04-10 RX ADMIN — MORPHINE SULFATE 0.2 MG: 0.5 INJECTION, SOLUTION EPIDURAL; INTRATHECAL; INTRAVENOUS at 13:50

## 2023-04-10 RX ADMIN — OXYTOCIN 40 UNITS: 10 INJECTION, SOLUTION INTRAMUSCULAR; INTRAVENOUS at 14:18

## 2023-04-10 NOTE — ANESTHESIA PROCEDURE NOTES
Spinal Block      Patient reassessed immediately prior to procedure    Patient location during procedure: OB  Start Time: 4/10/2023 1:50 PM  Stop Time: 4/10/2023 2:00 PM  Indication:at surgeon's request, post-op pain management and procedure for pain  Performed By  CRNA/CAA: Brayan Fox CRNA  Preanesthetic Checklist  Completed: patient identified, IV checked, site marked, risks and benefits discussed, surgical consent, monitors and equipment checked, pre-op evaluation and timeout performed  Spinal Block Prep:  Patient Position:sitting  Sterile Tech:gloves, mask, sterile barriers and cap  Prep:Betadine and Chloraprep  Patient Monitoring:blood pressure monitoring and continuous pulse oximetry    Spinal Block Procedure  Approach:midline  Guidance:landmark technique  Location:L4-L5  Needle Type:Quincke  Needle Gauge:25 G  Placement of Spinal needle event:cerebrospinal fluid aspirated  Paresthesia: no  Fluid Appearance:clear  Medications: morphine PF (DURAMORPH) injection - Intrathecal   0.2 mg - 4/10/2023 1:50:00 PM  fentaNYL (SUBLIMAZE) injection - Intrathecal   20 mcg - 4/10/2023 1:50:00 PM  bupivacaine PF (MARCAINE) injection 0.75% - Intrathecal   1 mL - 4/10/2023 1:50:00 PM   Post Assessment  Patient Tolerance:patient tolerated the procedure well with no apparent complications  Complications no

## 2023-04-10 NOTE — PLAN OF CARE
Problem: Adult Inpatient Plan of Care  Goal: Plan of Care Review  Outcome: Ongoing, Progressing  Goal: Patient-Specific Goal (Individualized)  Outcome: Ongoing, Progressing  Goal: Absence of Hospital-Acquired Illness or Injury  Outcome: Ongoing, Progressing  Intervention: Identify and Manage Fall Risk  Recent Flowsheet Documentation  Taken 4/10/2023 1009 by Beth Verdin RN  Safety Promotion/Fall Prevention: safety round/check completed  Intervention: Prevent and Manage VTE (Venous Thromboembolism) Risk  Recent Flowsheet Documentation  Taken 4/10/2023 1738 by Beth Verdin RN  Activity Management: bedrest  VTE Prevention/Management:   bilateral   sequential compression devices on  Taken 4/10/2023 1700 by Beth Verdin RN  Activity Management: bedrest  VTE Prevention/Management:   bilateral   sequential compression devices on  Taken 4/10/2023 1630 by Beth Verdin RN  Activity Management: bedrest  VTE Prevention/Management:   bilateral   sequential compression devices on  Taken 4/10/2023 1615 by Beth Verdin RN  Activity Management: bedrest  VTE Prevention/Management:   bilateral   sequential compression devices on  Taken 4/10/2023 1600 by Beth Verdin RN  Activity Management: bedrest  VTE Prevention/Management:   bilateral   sequential compression devices on  Taken 4/10/2023 1009 by Beth Verdin RN  VTE Prevention/Management:   bilateral   sequential compression devices on  Range of Motion: active ROM (range of motion) encouraged  Goal: Optimal Comfort and Wellbeing  Outcome: Ongoing, Progressing  Intervention: Provide Person-Centered Care  Recent Flowsheet Documentation  Taken 4/10/2023 1009 by Beth Verdin RN  Trust Relationship/Rapport:   care explained   choices provided   questions answered   thoughts/feelings acknowledged  Goal: Readiness for Transition of Care  Outcome: Ongoing, Progressing     Problem: Bleeding ( Delivery)  Goal: Bleeding is Controlled  Outcome: Ongoing, Progressing      Problem: Change in Fetal Wellbeing ( Delivery)  Goal: Stable Fetal Wellbeing  Outcome: Ongoing, Progressing     Problem: Infection ( Delivery)  Goal: Absence of Infection Signs and Symptoms  Outcome: Ongoing, Progressing     Problem: Respiratory Compromise ( Delivery)  Goal: Effective Oxygenation and Ventilation  Outcome: Ongoing, Progressing   Goal Outcome Evaluation:

## 2023-04-10 NOTE — OUTREACH NOTE
[facility]  IP Postpartum    Questions/Answers    Flowsheet Row Responses   Best Method for Contacting Cell   Support Person Present Yes   Does the patient have a car seat at the hospital Yes          Met with patient in labor and delivery prior to scheduled C/S. Excited but nervious. No current questions, needs or concerns.       Terese Schneider RN  Maternity Nurse Navigator    4/10/2023, 16:11 EDT

## 2023-04-10 NOTE — DISCHARGE INSTRUCTIONS
DR. LOPEZ'S POSTPARTUM DISCHARGE PRECAUTIONS and Answers to FAQs    NO SEX for [SIX] weeks.    NO TUB BATH or POOL for [TWO] week(s), shower only.  Sitz baths are fine.    STITCHES (if present):  wash them daily in the shower with soap and water (any type of soap is fine, it does not need to be antibacterial soap).  It is ok to gently put your finger in and around the vaginal area.  Look at your stitches (the ones on the outside) when you get home.  You will then know what is normal and can have a point of reference to compare it to if you start to have concerns.  REDNESS, PUS, increase in PAIN, FEVER or CHILLS are all reasons to be seen our office immediately.  Go to the ER, if it is after hours or a weekend.      VAGINAL BLEEDING:  may continue on and off over the next several weeks after delivery and may increase slightly once you go home.  You should not be bleeding more than 1 large pad soaked every hour or two.  Clots (even the size of a lemon or larger) may be normal as long as the bleeding is not heavy and the clots do not continue.      FEVER or CHILLS or NOT FEELING WELL: call our office.  If the office is closed, you need to be seen in acute care or ER.      CHEST PAIN or SHORTNESS OF BREATH/AIR: you need to GO TO THE NEAREST ER or CALL 911.     SWELLING: can increase over the next 7-10 days and then should slowly improve.  Your legs/ankles should be fairly similar in size.  A red, painful, hot, swollen leg (usually just one side) can be a sign of a blood clot and should be evaluated immediately.  Call our office.  If it is after hours or a weekend, you must be seen IMMEDIATELY IN THE ER.     ELEVATED BLOOD PRESSURE:  you need to contact us if you are having  persistent elevated BP systolic (top number) more than [155] or diastolic (bottom number) more than [95], or a headache (not relieved with rest, hydration or over the counter pain reliever), an increase in your swelling (usually hands and face),  changes in your vision (typically flashing white or black spots) or severe persistent pain in the location of the upper right side of your belly (under your right breast).  Call our office or go to ER if after hours or a weekend.    LACTATION QUESTIONS or CONCERNS?  Call [facility] Lactation Support [phone].    WORK and SCHOOL TIME OFF: depends on your specific delivery type, surrounding circumstances, and your work insurance/school rules.  If you have questions, please call [employer] or [employer] at [phone] (ext. [phone]).  Or email Aimee at [email or web address].  They will assist in required paperwork for you and/or family members.     Any further QUESTIONS or CONCERNS, please call [facility] at [phone].

## 2023-04-10 NOTE — DISCHARGE SUMMARY
[facility]         DISCHARGE SUMMARY    Patient Name: Rosie Bradley  : 1990  MRN: [id number]    Date of Admission: 4/10/2023  Date of Discharge:  2023   Primary Care Physician: Provider, No Known    Consults     No orders found from 3/12/2023 to 2023.           Procedures:  Repeat low transverse  section  Bilateral salpingectomy     Presenting Problem:   Antepartum mild preeclampsia [O14.00]   History of  section  37 weeks 1 day gestation  BMI 56       Admitting Diagnosis:  Antepartum mild preeclampsia [O14.00]   History of  section  37 weeks 1 day gestation  BMI 56       Discharge Diagnosis:   delivery, delivered  Bilateral salpingectomy     Delivery Summary     OB Surgeon:  Parth Power MD  Anesthesia: Spinal  Delivery Type:  LTCS, Bilateral salpingectomy  Perineum: OBPERINEUM: Intact  Feeding method: Breast    Infant: male  infant;    Weight: 2810 g (6 lb 3.1 oz)     APGARS: 8  @ 1 minute / 9  @ 5 minutes   Venous Blood Gas: No results found for: PHCVEN   Arterial Blood Gas: No results found for: PHCART     Hospital Course     Hospital Course:  Rosie Bradley is a 32 y.o.  37w1d who presented on 4/10/2023 for scheduled repeat  section for mild antepartum preeclampsia at early term. Patient underwent an uncomplicated  section. See delivery documentation for details.  Her postpartum course was unremarkable with the exception of elevated blood pressures. Patient was titrated to 60mg XL Procardia by day of discharge.  By day of discharge her vital signs were stable, she was voiding, ambulating, eating and pain was tolerable. She was deemed stable for discharge with follow up in the outpatient setting.      Day of Discharge     Vital Signs:  Temp:  [98 °F (36.7 °C)-98.5 °F (36.9 °C)] 98.5 °F (36.9 °C)  Heart Rate:  [] 104  Resp:  [18-20] 20  BP: (140-150)/(81-92) 140/82    Pertinent  and/or Most Recent Results      LAB RESULTS:       Lab 04/10/23  1006   WBC 11.44*   HEMOGLOBIN 11.9*   HEMATOCRIT 34.9   PLATELETS 180   MCV 88.6         Lab 04/10/23  1000   SODIUM 134*   POTASSIUM 3.5   CHLORIDE 101   CO2 21.4*   ANION GAP 11.6   BUN 9   CREATININE 0.42*   EGFR 131.8   GLUCOSE 78   CALCIUM 9.0         Lab 04/10/23  1000   TOTAL PROTEIN 7.1   ALBUMIN 3.5   GLOBULIN 3.6   ALT (SGPT) 6   AST (SGOT) 12   BILIRUBIN 0.2   ALK PHOS 136*             Lab 04/10/23  1006   ABO TYPING O   RH TYPING Positive   ANTIBODY SCREEN Negative     URINALYSIS@  Microbiology Results (last 10 days)     Procedure Component Value - Date/Time    Group B Strep (Molecular) - Swab, Vaginal/Rectum [[id number]]  (Abnormal) Collected: 04/07/23 1050    Lab Status: Final result Specimen: Swab from Vaginal/Rectum Updated: 04/08/23 1648     Group B Strep, DNA Positive      US Ob Limited 1 + Fetuses    Result Date: 3/16/2023  Impression:   OB ultrasound demonstrating single fetus in breech presentation at 33 weeks 2 days ultrasound age.  Fetal cardiac activity is seen at a rate of 150 beats per minute.  Biophysical profile score 8 of 8.     DAVIDA CALERO MD       Electronically Signed and Approved By: DAVIDA CALERO MD on 3/16/2023 at 14:42             US Fetal Biophysical Profile;Without Non-Stress Testing    Result Date: 3/16/2023  Impression:   OB ultrasound demonstrating single fetus in breech presentation at 33 weeks 2 days ultrasound age.  Fetal cardiac activity is seen at a rate of 150 beats per minute.  Biophysical profile score 8 of 8.     DAVIDA CALERO MD       Electronically Signed and Approved By: DAVIDA CALERO MD on 3/16/2023 at 14:42                 Discharge Details        Discharge Medications      New Medications      Instructions Start Date   acetaminophen 500 MG tablet  Commonly known as: TYLENOL   1,000 mg, Oral, Every 6 Hours      ibuprofen 600 MG tablet  Commonly known as: ADVIL,MOTRIN   600 mg, Oral, Every 6 Hours      NIFEdipine CC 60  MG 24 hr tablet  Commonly known as: ADALAT CC   60 mg, Oral, Every 24 Hours Scheduled      oxyCODONE 5 MG immediate release tablet  Commonly known as: ROXICODONE   5 mg, Oral, Every 6 Hours PRN         Continue These Medications      Instructions Start Date   freestyle lancets   Use to check blood glucose before breakfast and two hours after meals      FreeStyle Lite w/Device kit   1 each, Does not apply, 4 Times Daily, Use to check blood glucose before breakfast and two hours after meals      prenatal (CLASSIC) vitamin  tablet  Generic drug: prenatal vitamin   Oral, Daily         Stop These Medications    aspirin 81 MG EC tablet     glucose blood test strip  Commonly known as: FREESTYLE LITE            No Known Allergies    Discharge Disposition:   Home, self-care    Discharge Condition:  Good    Diet:   Regular    Discharge Activity:    See detailed discharge instructions.     Follow Up:  Future Appointments   Date Time Provider Department Center   4/20/2023  3:30 PM Parth Power MD [facility]       Electronically signed by Parth Power MD

## 2023-04-10 NOTE — ANESTHESIA PREPROCEDURE EVALUATION
Anesthesia Evaluation     Patient summary reviewed and Nursing notes reviewed   no history of anesthetic complications:  NPO Solid Status: > 8 hours  NPO Liquid Status: > 2 hours           Airway   Mallampati: II  TM distance: >3 FB  Neck ROM: full  No difficulty expected  Dental    (+) poor dentition    Comment: Missing a bunch of teeth, chipped, broken    Pulmonary - negative pulmonary ROS and normal exam    breath sounds clear to auscultation  Cardiovascular - normal exam  Exercise tolerance: good (4-7 METS)    Rhythm: regular  Rate: normal    (+) hypertension,       Neuro/Psych- negative ROS  GI/Hepatic/Renal/Endo - negative ROS     Musculoskeletal (-) negative ROS    Abdominal    Substance History - negative use     OB/GYN    (+) Pregnant, Preeclampsia,         Other - negative ROS       ROS/Med Hx Other: PAT Nursing Notes unavailable.                   Anesthesia Plan    ASA 3     spinal       Anesthetic plan, risks, benefits, and alternatives have been provided, discussed and informed consent has been obtained with: patient and spouse/significant other.        CODE STATUS:    Code Status (Patient has no pulse and is not breathing): CPR (Attempt to Resuscitate)  Medical Interventions (Patient has pulse or is breathing): Full

## 2023-04-10 NOTE — OP NOTE
[facility]   Section Operative Note    Pre-Operative Dx:   1.  IUP at Gestational Age: 37w1d  weeks    2. mild preeclampsia   3. History of prior  section  4. Desire for sterilization  5. BMI 56         Postoperative dx:    1.  Same     Procedure: Procedure(s):   SECTION REPEAT WITH TUBAL   Surgeon/Assistant: Surgeon(s):  Parth Power MD Kahleifeh, Basim, MD         Anesthesia:  Anesthesiologist: Choice  Anesthesiologist: Nikunj Gleason MD  CRNA: Brayan Fox CRNA     EBL: 800 mls.          QBL:          IV Fluids: 2800 mls.   UOP: 350 mls.; clear     I/O this shift:  In: -   Out: 789 [Urine:350; Blood:439]   Antibiotics: cefazolin (Ancef), 3 grams     Infant:      Name:  Phoenix          Gender: male  infant    Weight: 2810 g (6 lb 3.1 oz)     Apgars: 8  @ 1 minute /     9  @ 5 minutes    Cord gases: Venous:  No results found for: PHCVEN     Arterial:  No results found for: PHCART     Indication for C/Section:   Prior  section; preeclampsia, 37 weeks 1 day       Priority for C/Section:   scheduled     Procedure Details:   The patient was brought to the operating room and spinal anesthesia was deemed to be adequate.  She was prepped and draped in a normal sterile fashion and placed in supine position with a leftward tilt.  A vaginal prep was performed. A Pfannenstiel skin incision was made approximately 2 fingerbreadths above the symphysis pubis and carried down to the underlying layer of fascia.   The fascia was nicked in the midline and extended laterally bluntly.  The underlying rectus muscle was dissected off bluntly.  The midline was identified and opened in a blunt fashion with no noted damage to underlying bowel, bladder, blood vessels, or organs. The bladder blade was placed. Adhesions were noted on the anterior uterine wall on the fundus, nonobstructing to hysterotomy. The lower uterine segment was incised in a transverse fashion and extended laterally in a manual  fashion.  Fluid was noted to be clear. Breech presentation was encountered. The fetal hips were grasped and the  was brought out to the level of the scapula with the spine anterior. The fetus was turned 90 degrees with delivery of the left shoulder and arm. The fetus was rotated 180 degrees with delivery of the right shoulder and arm. The remainder of the  followed. The fetal  was brought up atraumatically through the uterine incision.   The infant demonstrated good tone,color and cry.  The cord was clamped and cut after a delay of 60 seconds and the infant was handed off to [facility] baby care.  A segment of cord was handed off to the technician for collection of cord blood and cord gases.  The placenta delivered with the assistance of fundal massage and manual removal. The uterus was exteriorized and cleared of all clots and debris.  The uterine incision was repaired with 0 Monocryl in a running  fashion.  A second imbricating stitch of 0-Monocryl was placed. The area of the anterior adhesion was oozing and was made hemostatic with 0-Vicryl in figure of eight fashion.  Excellent hemostasis was assured.  Attention was turned to performing the bilateral salpingectomy. The Ligasure bipolar device was called for. The left fimbriated end was elevated with neri clamps and cauterized and ligated through the mesosalpinx in a distal to proximal direction to the cornua. The fallopian tube was ligated from the uterus and sent to pathology. In a similar fashion the right fallopian tube was removed. The specimens were sent seperately to pathology. A posterior adhesion was observed between the uterus and bowel. This was taken down proximal to the uterus avoiding injury to the bowel. Hemostasis was observed.    The uterus was placed back into the pelvic cavity.  Copious irrigation was performed.  The gutters were cleared of all clot and debris.  The uterine incision was reinspected off tension and noted to be  hemostatic.    The rectus muscles and fascia were noted to be hemostatic.  The fascia was closed with 0 Vicryl in a running fashion starting at the contralateral angle and closed at the ipsilateral end to the surgeon.  The subcutaneous tissue was copiously irrigated and made hemostatic.  It was reapproximated using 3-0 Vicryl and the skin was closed with 4-0 Vicryl in a subcuticular fashion.     The patient tolerated the procedure well.  Instrument, lap, and needle counts were correct.  The patient received antibiotics prior to the procedure.  She was taken to the recovery room in stable condition.      Dr. Moran was present and scrubbed for the pertinent steps of the procedure and his assistance was needed for the success of the case.     Parth Power MD          Complications:   None      Disposition:   Mother to Mother Baby/Postpartum  in stable condition currently.   Baby to remains with mom  in stable condition currently.       Parth Power MD  4/10/2023  15:27 EDT

## 2023-04-10 NOTE — H&P
[facility]  Obstetric History and Physical    Chief Complaint:  Scheduled CS    Subjective:  The patient is a 32 y.o.  currently at 37w1d, who presents for scheduled repeat  section. Patient with diagnoses of preeclampsia in early third trimester. Patient denies any headache, visual disturbances, new onset nausea vomiting, right upper quadrant pain, or new onset swelling. Patient denies SOA, CP, fevers or chills. Reports good fetal movement, no LOF, regular CTX or vaginal bleeding. Has received BTMS in pregnancy       Her prenatal care is complicated by:   Patient Active Problem List   Diagnosis   • High-risk pregnancy in third trimester   • Previous  section   • Maternal obesity affecting pregnancy, antepartum   • History of gestational hypertension   • History of gestational diabetes in prior pregnancy, currently pregnant   • Rubella non-immune status, antepartum   • HSV infection   • Unwanted fertility   • Preeclampsia   • Itching   • At risk for gestational diabetes mellitus         The following portions of the patients history were reviewed and updated as appropriate: current medications, allergies, past medical history, past surgical history, past social history and problem list .     Prenatal Information:  Prenatal Results     POC Urine Glucose/Protein     Test Value Reference Range Date Time    Urine Glucose  Negative mg/dL Negative 23 1016    Urine Protein  2+ mg/dL Negative 23 1016          Initial Prenatal Labs     Test Value Reference Range Date Time    Hemoglobin  11.7 g/dL 12.0 - 15.9 12/10/22 1949       11.7 g/dL 12.0 - 15.9 22 1038    Hematocrit  34.4 % 34.0 - 46.6 12/10/22 1949       35.8 % 34.0 - 46.6 22 1038    Platelets  176 10*3/mm3 140 - 450 12/10/22 1949       166 10*3/mm3 140 - 450 22 1038    Rubella IgG  <0.90 index Immune >0.99 22 1038    Hepatitis B SAg  Non-Reactive  Non-Reactive 22 1038    Hepatitis C Ab  Non-Reactive   Non-Reactive 12/08/22 1038    RPR  Non-Reactive  Non-Reactive 12/08/22 1038    T. Pallidum Ab         ABO  O   04/10/23 1006    Rh  Positive   04/10/23 1006    Antibody Screen  Negative   12/08/22 1038    HIV  Non-Reactive  Non-Reactive 12/08/22 1038    Urine Culture  >100,000 CFU/mL Mixed Neda Isolated   12/08/22 1029    Gonorrhea  Negative  Negative 12/08/22 1029    Chlamydia  Negative  Negative 12/08/22 1029    TSH        HgB A1c   4.80 % 4.80 - 5.60 03/16/23 1227       4.80 % 4.80 - 5.60 01/19/23 1035       5.10 % 4.80 - 5.60 12/08/22 1038          2nd and 3rd Trimester     Test Value Reference Range Date Time    Hemoglobin (repeated)  11.9 g/dL 12.0 - 15.9 04/10/23 1006       11.1 g/dL 12.0 - 15.9 03/22/23 1006       10.9 g/dL 12.0 - 15.9 03/16/23 1227       12.0 g/dL 12.0 - 15.9 03/08/23 1302       12.5 g/dL 12.0 - 15.9 03/06/23 0844       10.9 g/dL 12.0 - 15.9 02/02/23 1634       11.4 g/dL 12.0 - 15.9 01/19/23 1035    Hematocrit (repeated)  34.9 % 34.0 - 46.6 04/10/23 1006       32.7 % 34.0 - 46.6 03/22/23 1006       32.3 % 34.0 - 46.6 03/16/23 1227       34.9 % 34.0 - 46.6 03/08/23 1302       38.1 % 34.0 - 46.6 03/06/23 0844       32.2 % 34.0 - 46.6 02/02/23 1634       33.8 % 34.0 - 46.6 01/19/23 1035    Platelets   180 10*3/mm3 140 - 450 04/10/23 1006       172 10*3/mm3 140 - 450 03/22/23 1006       163 10*3/mm3 140 - 450 03/16/23 1227       191 10*3/mm3 140 - 450 03/08/23 1302       188 10*3/mm3 140 - 450 03/06/23 0844       167 10*3/mm3 140 - 450 02/02/23 1634       186 10*3/mm3 140 - 450 01/19/23 1035       176 10*3/mm3 140 - 450 12/10/22 1949       166 10*3/mm3 140 - 450 12/08/22 1038    GCT  148 mg/dL 65 - 139 02/02/23 1634    Antibody Screen (repeated)  Negative   04/10/23 1006       Negative   03/16/23 1226    GTT Fasting  147 mg/dL 65 - 94 02/02/23 1634    GTT 1 Hr        GTT 2 Hr        GTT 3 Hr        Group B Strep  Positive  Negative 04/07/23 1050          Drug Screening     Test Value Reference  Range Date Time    Amphetamine Screen        Barbiturate Screen  Negative  Negative 04/10/23 0958       Negative  Negative 03/16/23 1232    Benzodiazepine Screen  Negative  Negative 04/10/23 0958       Negative  Negative 03/16/23 1232    Methadone Screen  Negative  Negative 04/10/23 0958       Negative  Negative 03/16/23 1232    Phencyclidine Screen        Opiates Screen  Negative  Negative 04/10/23 0958       Negative  Negative 03/16/23 1232    THC Screen  Negative  Negative 04/10/23 0958       Negative  Negative 03/16/23 1232    Cocaine Screen  Negative  Negative 04/10/23 0958       Negative  Negative 03/16/23 1232    Propoxyphene Screen        Buprenorphine Screen        Methamphetamine Screen        Oxycodone Screen  Negative  Negative 04/10/23 0958       Negative  Negative 03/16/23 1232    Tricyclic Antidepressants Screen              Other (Risk screening)     Test Value Reference Range Date Time    Varicella IgG        Parvovirus IgG        CMV IgG        Cystic Fibrosis        Hemoglobin electrophoresis        NIPT        MSAFP-4        AFP (for NTD only)              Legend    ^: Historical                      External Prenatal Results     Pregnancy Outside Results - Transcribed From Office Records - See Scanned Records For Details     Test Value Date Time    ABO  O  04/10/23 1006    Rh  Positive  04/10/23 1006    Antibody Screen  Negative  04/10/23 1006       Negative  03/16/23 1226       Negative  12/08/22 1038    Varicella IgG       Rubella  <0.90 index 12/08/22 1038    Hgb  11.9 g/dL 04/10/23 1006       11.1 g/dL 03/22/23 1006       10.9 g/dL 03/16/23 1227       12.0 g/dL 03/08/23 1302       12.5 g/dL 03/06/23 0844       10.9 g/dL 02/02/23 1634       11.4 g/dL 01/19/23 1035       11.7 g/dL 12/10/22 1949       11.7 g/dL 12/08/22 1038    Hct  34.9 % 04/10/23 1006       32.7 % 03/22/23 1006       32.3 % 03/16/23 1227       34.9 % 03/08/23 1302       38.1 % 03/06/23 0844       32.2 % 02/02/23 1634        33.8 % 23 1035       34.4 % 12/10/22 1949       35.8 % 22 1038    Glucose Fasting GTT  147 mg/dL 23 1634    Glucose Tolerance Test 1 hour       Glucose Tolerance Test 3 hour       Gonorrhea (discrete)  Negative  22 1029    Chlamydia (discrete)  Negative  22 1029    RPR  Non-Reactive  22 1038    VDRL       Syphilis Antibody       HBsAg  Non-Reactive  22 1038    Herpes Simplex Virus PCR       Herpes Simplex VIrus Culture       HIV  Non-Reactive  22 1038    Hep C RNA Quant PCR       Hep C Antibody  Non-Reactive  22 1038    AFP       Group B Strep  Positive  23 1050    GBS Susceptibility to Clindamycin       GBS Susceptibility to Erythromycin       Fetal Fibronectin       Genetic Testing, Maternal Blood             Drug Screening     Test Value Date Time    Urine Drug Screen       Amphetamine Screen       Barbiturate Screen  Negative  04/10/23 0958       Negative  23 1232    Benzodiazepine Screen  Negative  04/10/23 0958       Negative  23 1232    Methadone Screen  Negative  04/10/23 0958       Negative  23 1232    Phencyclidine Screen       Opiates Screen  Negative  04/10/23 0958       Negative  23 1232    THC Screen  Negative  04/10/23 0958       Negative  23 1232    Cocaine Screen       Propoxyphene Screen       Buprenorphine Screen       Methamphetamine Screen       Oxycodone Screen  Negative  04/10/23 0958       Negative  23 1232    Tricyclic Antidepressants Screen             Legend    ^: Historical                        Past OB History:  OB History    Para Term  AB Living   2 1 0 1 0 1   SAB IAB Ectopic Molar Multiple Live Births   0 0 0 0 0 1      # Outcome Date GA Lbr Ricky/2nd Weight Sex Delivery Anes PTL Lv   2 Current            1  19 36w6d  2750 g (6 lb 1 oz) M CS-LTranv  N TEENA      Complications: Hypertension, Gestational diabetes       Past Medical History:  Past Medical History:    Diagnosis Date   • Gestational diabetes    • Pre-diabetes     boarderline diabetes   • Preeclampsia    • Previous  delivery, antepartum 2022    Delivered at 36w6d due to gestational hypertension       Past Surgical History:  Past Surgical History:   Procedure Laterality Date   •  SECTION     • MANDIBLE FRACTURE SURGERY     • TONSILLECTOMY     • WISDOM TOOTH EXTRACTION         Family History:  Family History   Problem Relation Age of Onset   • Diabetes Mother    • Hypertension Sister        Social History:   reports that she has never smoked. She has never used smokeless tobacco.   reports no history of alcohol use.   reports no history of drug use.    Medications:  FreeStyle Lite, aspirin, freestyle, glucose blood, and prenatal vitamin    Allergies:  No Known Allergies    Review of Systems:  No leaking fluid, No vaginal bleeding, No contractions, Adequate FM, No HA, No scotomata or vision changes, No RUQ/epigastric pain, No swelling, No fever/chills, No nausea, No vomiting, No diarrhea, No constipation, No abdominal pain and No back pain    Objective     Vital Signs:  BP: (159)/(76) 159/76     Physical Exam:    General:  alert, well appearing, in no apparent distress  HEENT: PERRLA, extra ocular movement intact, sclera clear, anicteric and neck supple with midline trachea  Cardiovascular: normal rate, regular rhythm,  no murmurs, rubs, or gallops  Lungs: clear to auscultation, no wheezes, rales or rhonchi, symmetric air entry  Abdomen: soft, nontender, nondistended, no abnormal masses, no epigastric pain, fundus soft, nontender 37 weeks size and estimated fetal weight: 2900  Extremities: no pedal edema noted, no redness or tenderness in the calves or thighs 2+ bilaterally  Pelvic exam: deferred     Fetal Assessment:    FHR:   Baseline: 140  Variability: Moderate  Accelerations: Present (32 weeks+) 15 x 15 bpm  Decelerations: Absent   Category: Category 1    Contractions: Not  cristy      Labs:   Lab Results (last 24 hours)     Procedure Component Value Units Date/Time    Urine Drug Screen - Urine, Clean Catch [[id number]]  (Normal) Collected: 04/10/23 0958    Specimen: Urine, Clean Catch Updated: 04/10/23 1106     Amphet/Methamphet, Screen Negative     Barbiturates Screen, Urine Negative     Benzodiazepine Screen, Urine Negative     Cocaine Screen, Urine Negative     Opiate Screen Negative     THC, Screen, Urine Negative     Methadone Screen, Urine Negative     Oxycodone Screen, Urine Negative    Narrative:      Negative Thresholds Per Drugs Screened:    Amphetamines                 500 ng/ml  Barbiturates                 200 ng/ml  Benzodiazepines              100 ng/ml  Cocaine                      300 ng/ml  Methadone                    300 ng/ml  Opiates                      300 ng/ml  Oxycodone                    100 ng/ml  THC                           50 ng/ml    The Normal Value for all drugs tested is negative. This report includes final unconfirmed screening results to be used for medical treatment purposes only. Unconfirmed results must not be used for non-medical purposes such as employment or legal testing. Clinical consideration should be applied to any drug of abuse test, particularly when unconfirmed results are used.            CBC (No Diff) [[id number]]  (Abnormal) Collected: 04/10/23 1006    Specimen: Blood Updated: 04/10/23 1033     WBC 11.44 10*3/mm3      RBC 3.94 10*6/mm3      Hemoglobin 11.9 g/dL      Hematocrit 34.9 %      MCV 88.6 fL      MCH 30.2 pg      MCHC 34.1 g/dL      RDW 14.1 %      RDW-SD 45.3 fl      MPV 10.2 fL      Platelets 180 10*3/mm3            Hospital Problems:    Preeclampsia    High-risk pregnancy in third trimester    Previous  section    Maternal obesity affecting pregnancy, antepartum    HSV infection      Assessment:  32 y.o.  currently at 37w1d    Preeclampsia    High-risk pregnancy in third trimester    Previous   section    Maternal obesity affecting pregnancy, antepartum    HSV infection    GBS: Positive    Plan:  , bilateral salpingectomy, possible occlusion of one or both tubes  anesthesia consultation   3 grams Ancef  CBC, CMP, UPCR  Monitor VS for needs for antihypertensive medication   Plan of care has been reviewed with patient  Risks, benefits of treatment plan have been discussed.  All questions have been answered.    The patient was counseled for the need of  section.  The risk, benefits, and alternatives of surgery were discussed with the patient.  The risks discussed included but were not limited to:  • bleeding  • infection  • injury to surrounding structures including bowel and bladder  • injury to vasculature  • injury to   • thrombosis  • need for  hysterectomy  • Risk of disfiguring scar  • Need for blood transfusion  • Maternal mortality    After the patient was adequately counseled, the patient consented to move forward with  delivery.  Patient willing to accept blood products in after discussion of risk, benefits and alternatives.   Patient willing to accept  hysterectomy in life saving situation.       Parth Power MD  4/10/2023  12:27 EDT

## 2023-04-11 PROCEDURE — 25010000002 KETOROLAC TROMETHAMINE PER 15 MG: Performed by: STUDENT IN AN ORGANIZED HEALTH CARE EDUCATION/TRAINING PROGRAM

## 2023-04-11 PROCEDURE — 63710000001 DIPHENHYDRAMINE PER 50 MG: Performed by: NURSE ANESTHETIST, CERTIFIED REGISTERED

## 2023-04-11 RX ORDER — NIFEDIPINE 30 MG/1
30 TABLET, EXTENDED RELEASE ORAL
Status: DISCONTINUED | OUTPATIENT
Start: 2023-04-11 | End: 2023-04-12

## 2023-04-11 RX ADMIN — KETOROLAC TROMETHAMINE 15 MG: 30 INJECTION, SOLUTION INTRAMUSCULAR; INTRAVENOUS at 05:15

## 2023-04-11 RX ADMIN — DIPHENHYDRAMINE HYDROCHLORIDE 25 MG: 25 CAPSULE ORAL at 15:06

## 2023-04-11 RX ADMIN — NIFEDIPINE 30 MG: 30 TABLET, FILM COATED, EXTENDED RELEASE ORAL at 07:37

## 2023-04-11 RX ADMIN — KETOROLAC TROMETHAMINE 15 MG: 30 INJECTION, SOLUTION INTRAMUSCULAR; INTRAVENOUS at 10:14

## 2023-04-11 RX ADMIN — ACETAMINOPHEN 1000 MG: 500 TABLET ORAL at 13:39

## 2023-04-11 RX ADMIN — DOCUSATE SODIUM 100 MG: 100 CAPSULE, LIQUID FILLED ORAL at 07:37

## 2023-04-11 RX ADMIN — ACETAMINOPHEN 1000 MG: 500 TABLET ORAL at 19:38

## 2023-04-11 RX ADMIN — KETOROLAC TROMETHAMINE 15 MG: 30 INJECTION, SOLUTION INTRAMUSCULAR; INTRAVENOUS at 17:00

## 2023-04-11 RX ADMIN — ACETAMINOPHEN 1000 MG: 500 TABLET ORAL at 07:36

## 2023-04-11 RX ADMIN — IBUPROFEN 600 MG: 600 TABLET, FILM COATED ORAL at 21:41

## 2023-04-11 RX ADMIN — PRENATAL WITH FERROUS FUM AND FOLIC ACID 1 TABLET: 3080; 920; 120; 400; 22; 1.84; 3; 20; 10; 1; 12; 200; 27; 25; 2 TABLET ORAL at 07:37

## 2023-04-11 NOTE — PLAN OF CARE
Problem: Adult Inpatient Plan of Care  Goal: Plan of Care Review  Outcome: Ongoing, Progressing  Goal: Patient-Specific Goal (Individualized)  Outcome: Ongoing, Progressing  Goal: Absence of Hospital-Acquired Illness or Injury  Outcome: Ongoing, Progressing  Intervention: Identify and Manage Fall Risk  Intervention: Prevent and Manage VTE (Venous Thromboembolism) Risk  Intervention: Prevent Infection  Goal: Optimal Comfort and Wellbeing  Outcome: Ongoing, Progressing  Intervention: Provide Person-Centered Care  Goal: Readiness for Transition of Care  Outcome: Ongoing, Progressing     Problem: Bleeding ( Delivery)  Goal: Bleeding is Controlled  Outcome: Ongoing, Progressing     Problem: Change in Fetal Wellbeing ( Delivery)  Goal: Stable Fetal Wellbeing  Outcome: Ongoing, Progressing     Problem: Infection ( Delivery)  Goal: Absence of Infection Signs and Symptoms  Outcome: Ongoing, Progressing  Intervention: Minimize Infection Risk     Problem: Respiratory Compromise ( Delivery)  Goal: Effective Oxygenation and Ventilation  Outcome: Ongoing, Progressing     Problem: Breastfeeding  Goal: Effective Breastfeeding  Outcome: Ongoing, Progressing   Goal Outcome Evaluation:         Pt up out of bed. Pain, lochia wnl. Bp slightly elevated- on po procardia starting today. No symptoms of bp elevation. Voiding. Showered with assist. Using Incentive. Appetite good-passing gas. Bonding well with infant. Sibling also visited.

## 2023-04-11 NOTE — PROGRESS NOTES
[facility]   PROGRESS NOTE    Post-Op Day 1 S/P     Subjective:  Patient has no complaints  Pain controlled  Tolerating a regular diet  Passing flatus  Ambulating  Urinating spontaneously  Lochia decreasing, no bleeding concerns  Denies HA, vision change, or RUQ/epigastric pain  No lightheadedness or dizziness    Objective    Objective     Temp:  [97.7 °F (36.5 °C)-98.9 °F (37.2 °C)] 98.2 °F (36.8 °C)  Heart Rate:  [] 85  Resp:  [16-22] 20  BP: (124-159)/(56-97) 150/77     Physical Exam:  GEN: alert and in no distress  Abdomen: fundus firm - below the umbilicus  abdomen soft + BS's  appropriately tender  Incision: dressed, no strikethrough, surrounding erythema or induration  Extremino pedal edema noted, Tadeo's sign negative bilaterally, no redness or tenderness in the calves or thighsties:     Labs:  Lab Results (last 24 hours)     Procedure Component Value Units Date/Time    Urine Drug Screen - Urine, Clean Catch [[id number]]  (Normal) Collected: 04/10/23 0958    Specimen: Urine, Clean Catch Updated: 04/10/23 1106     Amphet/Methamphet, Screen Negative     Barbiturates Screen, Urine Negative     Benzodiazepine Screen, Urine Negative     Cocaine Screen, Urine Negative     Opiate Screen Negative     THC, Screen, Urine Negative     Methadone Screen, Urine Negative     Oxycodone Screen, Urine Negative    Narrative:      Negative Thresholds Per Drugs Screened:    Amphetamines                 500 ng/ml  Barbiturates                 200 ng/ml  Benzodiazepines              100 ng/ml  Cocaine                      300 ng/ml  Methadone                    300 ng/ml  Opiates                      300 ng/ml  Oxycodone                    100 ng/ml  THC                           50 ng/ml    The Normal Value for all drugs tested is negative. This report includes final unconfirmed screening results to be used for medical treatment purposes only. Unconfirmed results must not be used for non-medical purposes such  as employment or legal testing. Clinical consideration should be applied to any drug of abuse test, particularly when unconfirmed results are used.            Protein / Creatinine Ratio, Urine - Urine, Clean Catch [[id number]] Collected: 04/10/23 0958    Specimen: Urine, Clean Catch Updated: 04/10/23 1249     Creatinine, Urine 108.0 mg/dL      Total Protein, Urine 39.5 mg/dL      Protein/Creatinine Ratio, Urine 0.37    Comprehensive Metabolic Panel [[id number]]  (Abnormal) Collected: 04/10/23 1000    Specimen: Blood Updated: 04/10/23 1315     Glucose 78 mg/dL      BUN 9 mg/dL      Creatinine 0.42 mg/dL      Sodium 134 mmol/L      Potassium 3.5 mmol/L      Chloride 101 mmol/L      CO2 21.4 mmol/L      Calcium 9.0 mg/dL      Total Protein 7.1 g/dL      Albumin 3.5 g/dL      ALT (SGPT) 6 U/L      AST (SGOT) 12 U/L      Alkaline Phosphatase 136 U/L      Total Bilirubin 0.2 mg/dL      Globulin 3.6 gm/dL      A/G Ratio 1.0 g/dL      BUN/Creatinine Ratio 21.4     Anion Gap 11.6 mmol/L      eGFR 131.8 mL/min/1.73     Narrative:      GFR Normal >60  Chronic Kidney Disease <60  Kidney Failure <15      CBC (No Diff) [[id number]]  (Abnormal) Collected: 04/10/23 1006    Specimen: Blood Updated: 04/10/23 1033     WBC 11.44 10*3/mm3      RBC 3.94 10*6/mm3      Hemoglobin 11.9 g/dL      Hematocrit 34.9 %      MCV 88.6 fL      MCH 30.2 pg      MCHC 34.1 g/dL      RDW 14.1 %      RDW-SD 45.3 fl      MPV 10.2 fL      Platelets 180 10*3/mm3     Tissue Pathology Exam [[id number]] Collected: 04/10/23 1532    Specimen: Tissue from Fallopian Tube, Left; Tissue from Fallopian Tube, Right Updated: 23 0700             Assessment & Plan        Preeclampsia    High-risk pregnancy in third trimester    Previous  section    Maternal obesity affecting pregnancy, antepartum    HSV infection    Assessment & Plan    Assessment:    Rosie Bradley is Day 1  post-partum  , Low Vertical   .      Plan:    Routine postpartum/postop  care    Remove Soliz    Encourage incentive spirometry    Remove dressing after 24 hours.    Contraception: s/p bilateral salpingectomy    Breast-feeding both    Advance diet, Ambulate, Saline lock IV, Shower, PO pain meds, Importance of wound care/keep clean and dry, Breast feeding support, PP/PO precautions given, HTN precautions reviewed in detail.  Questions answered.  RTO/ER for HA not relieved w tylenol, vision changes, epig/RUQ pain, or Bps elevated at home.    Begin Procardia 30 XL. Monitor for symptoms of preeclampsia. Anticpate D/C POD#2-3     Parth Power MD  04/11/23  07:33 EDT

## 2023-04-11 NOTE — ANESTHESIA POSTPROCEDURE EVALUATION
Patient: Rosie Bradley    Procedure Summary     Date: 04/10/23 Room / Location: [facility] LABOR DELIVERY  [facility] LABOR DELIVERY    Anesthesia Start: 1344 Anesthesia Stop:     Procedure:  SECTION REPEAT WITH TUBAL (Abdomen) Diagnosis: (PREECLAMPSIA; REPEAT C SECTION)    Surgeons: Parth Power MD Provider: Nikunj Gleason MD    Anesthesia Type: spinal ASA Status: 3          Anesthesia Type: spinal    Vitals  Vitals Value Taken Time   /77 23 0521   Temp 36.8 °C (98.2 °F) 23 0521   Pulse 85 23 0521   Resp 20 23 05   SpO2 94 % 04/10/23 1843   Vitals shown include unvalidated device data.        Post Anesthesia Care and Evaluation    Patient location during evaluation: bedside  Patient participation: complete - patient participated  Level of consciousness: awake  Pain management: adequate    Airway patency: patent  PONV Status: controlled  Cardiovascular status: acceptable and stable  Respiratory status: acceptable  Hydration status: acceptable  Post Neuraxial Block status: Motor and sensory function returned to baseline and No signs or symptoms of PDPH

## 2023-04-11 NOTE — LACTATION NOTE
CAMI in to see this P2 patient. She states she struggled with breastfeeding her other child. She has been struggling with breastfeeding this baby. She states she is more interested in pumping and bottle feeding. CAMI discussed good pumping guidelines and expectations with pumping and bottle feedings and stressed the need to pump with each infant feeding. CAMI demonstrated use of her personal pump and she was able to pump one side at a time and expressed 3.5 ml at this pumping session. CAMI provided some syringes to allow her to syringe feed her colostrum at next infant feeding.

## 2023-04-12 LAB
CYTO UR: NORMAL
LAB AP CASE REPORT: NORMAL
LAB AP CLINICAL INFORMATION: NORMAL
PATH REPORT.FINAL DX SPEC: NORMAL
PATH REPORT.GROSS SPEC: NORMAL

## 2023-04-12 RX ORDER — NIFEDIPINE 60 MG/1
60 TABLET, EXTENDED RELEASE ORAL
Status: DISCONTINUED | OUTPATIENT
Start: 2023-04-12 | End: 2023-04-13 | Stop reason: HOSPADM

## 2023-04-12 RX ADMIN — ACETAMINOPHEN 1000 MG: 500 TABLET ORAL at 08:17

## 2023-04-12 RX ADMIN — PRENATAL WITH FERROUS FUM AND FOLIC ACID 1 TABLET: 3080; 920; 120; 400; 22; 1.84; 3; 20; 10; 1; 12; 200; 27; 25; 2 TABLET ORAL at 08:17

## 2023-04-12 RX ADMIN — IBUPROFEN 600 MG: 600 TABLET, FILM COATED ORAL at 04:26

## 2023-04-12 RX ADMIN — OXYCODONE 5 MG: 5 TABLET ORAL at 16:01

## 2023-04-12 RX ADMIN — IBUPROFEN 600 MG: 600 TABLET, FILM COATED ORAL at 16:01

## 2023-04-12 RX ADMIN — IBUPROFEN 600 MG: 600 TABLET, FILM COATED ORAL at 22:01

## 2023-04-12 RX ADMIN — ACETAMINOPHEN 1000 MG: 500 TABLET ORAL at 00:41

## 2023-04-12 RX ADMIN — ACETAMINOPHEN 1000 MG: 500 TABLET ORAL at 17:14

## 2023-04-12 RX ADMIN — NIFEDIPINE 60 MG: 60 TABLET, EXTENDED RELEASE ORAL at 08:17

## 2023-04-12 RX ADMIN — DOCUSATE SODIUM 100 MG: 100 CAPSULE, LIQUID FILLED ORAL at 08:17

## 2023-04-12 RX ADMIN — ACETAMINOPHEN 1000 MG: 500 TABLET ORAL at 22:03

## 2023-04-12 RX ADMIN — IBUPROFEN 600 MG: 600 TABLET, FILM COATED ORAL at 10:45

## 2023-04-12 NOTE — PLAN OF CARE
Problem: Adult Inpatient Plan of Care  Goal: Plan of Care Review  Outcome: Ongoing, Progressing  Goal: Patient-Specific Goal (Individualized)  Outcome: Ongoing, Progressing  Goal: Absence of Hospital-Acquired Illness or Injury  Outcome: Ongoing, Progressing  Intervention: Identify and Manage Fall Risk  Intervention: Prevent and Manage VTE (Venous Thromboembolism) Risk  Goal: Optimal Comfort and Wellbeing  Outcome: Ongoing, Progressing  Intervention: Monitor Pain and Promote Comfort  Goal: Readiness for Transition of Care  Outcome: Ongoing, Progressing     Problem: Bleeding ( Delivery)  Goal: Bleeding is Controlled  Outcome: Ongoing, Progressing     Problem: Change in Fetal Wellbeing ( Delivery)  Goal: Stable Fetal Wellbeing  Outcome: Ongoing, Progressing     Problem: Infection ( Delivery)  Goal: Absence of Infection Signs and Symptoms  Outcome: Ongoing, Progressing     Problem: Respiratory Compromise ( Delivery)  Goal: Effective Oxygenation and Ventilation  Outcome: Ongoing, Progressing     Problem: Breastfeeding  Goal: Effective Breastfeeding  Outcome: Ongoing, Progressing   Goal Outcome Evaluation:   Progressing toward all goals

## 2023-04-12 NOTE — LACTATION NOTE
"LC in to see this patient and she was pumping and expressed 30 ml at this pumping session. She prefers to pump one side at a time. Pumping has not been painful and no trauma/redness on nipples. LC noted more \"veining\" today and encouraged patient for pumping through the night.   "

## 2023-04-12 NOTE — PLAN OF CARE
Problem: Adult Inpatient Plan of Care  Goal: Plan of Care Review  Outcome: Ongoing, Progressing  Goal: Patient-Specific Goal (Individualized)  Outcome: Ongoing, Progressing  Goal: Absence of Hospital-Acquired Illness or Injury  Outcome: Ongoing, Progressing  Intervention: Identify and Manage Fall Risk  Recent Flowsheet Documentation  Taken 4/12/2023 0400 by Barb Pinedo RN  Safety Promotion/Fall Prevention: safety round/check completed  Taken 4/12/2023 0300 by Barb Pinedo RN  Safety Promotion/Fall Prevention: safety round/check completed  Taken 4/12/2023 0200 by Barb Pinedo RN  Safety Promotion/Fall Prevention: safety round/check completed  Taken 4/12/2023 0057 by Barb Pinedo RN  Safety Promotion/Fall Prevention: safety round/check completed  Taken 4/12/2023 0000 by Barb Pinedo RN  Safety Promotion/Fall Prevention: safety round/check completed  Taken 4/11/2023 2158 by Barb Pinedo RN  Safety Promotion/Fall Prevention: safety round/check completed  Taken 4/11/2023 2000 by Barb Pinedo RN  Safety Promotion/Fall Prevention: safety round/check completed  Intervention: Prevent and Manage VTE (Venous Thromboembolism) Risk  Recent Flowsheet Documentation  Taken 4/11/2023 1945 by Barb Pinedo RN  Activity Management: up ad antoine  VTE Prevention/Management: (up, ambulating in room.  states that she is wearing them when in the bed.) sequential compression devices off  Intervention: Prevent Infection  Recent Flowsheet Documentation  Taken 4/11/2023 1945 by Barb Pinedo RN  Infection Prevention:   hand hygiene promoted   environmental surveillance performed   rest/sleep promoted  Goal: Optimal Comfort and Wellbeing  Outcome: Ongoing, Progressing  Intervention: Provide Person-Centered Care  Recent Flowsheet Documentation  Taken 4/11/2023 1945 by Barb Pinedo RN  Trust Relationship/Rapport:   care explained   choices provided   emotional support provided   empathic listening provided   questions answered    questions encouraged   reassurance provided   thoughts/feelings acknowledged  Goal: Readiness for Transition of Care  Outcome: Ongoing, Progressing   Goal Outcome Evaluation:

## 2023-04-12 NOTE — PROGRESS NOTES
[facility]   PROGRESS NOTE    Post-Op Day 2 S/P     Subjective:  Patient has no complaints  Pain controlled  Tolerating a regular diet  Passing flatus  Ambulating  Urinating spontaneously  Lochia decreasing, no bleeding concerns  Denies HA, vision change, or RUQ/epigastric pain  No lightheadedness or dizziness    Objective    Objective     Temp:  [98.2 °F (36.8 °C)-98.6 °F (37 °C)] 98.2 °F (36.8 °C)  Heart Rate:  [] 110  Resp:  [18] 18  BP: (142-158)/(71-97) 158/97     Physical Exam:  GEN: alert and in no distress; obese  Abdomen: fundus firm - below the umbilicus  abdomen soft + BS's  appropriately tender  Incision: not dressed, clean, dry, and intact, healing well, no drainage, no erythema, no hernia, no seroma, no swelling, approximately 3 mm opening on right aspect of incision; steri strip to be added.   Extremino pedal edema noted, Tadeo's sign negative bilaterally, no redness or tenderness in the calves or thighsties:     Labs:  Lab Results (last 24 hours)     ** No results found for the last 24 hours. **             Assessment & Plan        Preeclampsia    High-risk pregnancy in third trimester    Previous  section    Maternal obesity affecting pregnancy, antepartum    HSV infection    Assessment & Plan    Assessment:    Rosie Bradley is Day 2  post-partum  , Low Vertical   .      Plan:    Routine postpartum/postop care    Continue to advance diet as tolerates    Adequate urine output, continue to monitor    Encourage incentive spirometry    Remove IV    Incision well healing, continue routine care    Contraception: Status post bilateral salpingectomy    Breast-feeding bottle    Advance diet, Ambulate, Saline lock IV, Shower, PO pain meds, Importance of wound care/keep clean and dry, Follow up scheduled, PP/PO precautions given, HTN precautions reviewed in detail.  Questions answered.  RTO/ER for HA not relieved w tylenol, vision changes, epig/RUQ pain, or Bps elevated  at home.    Increase in Procardia from 30 mg XL to 60 mg XL.  Continue to monitor vital signs for worsening blood pressure concerns.  Likely discharge tomorrow.    Parth Power MD  04/12/23  08:39 EDT.

## 2023-04-13 ENCOUNTER — PATIENT OUTREACH (OUTPATIENT)
Dept: LABOR AND DELIVERY | Facility: HOSPITAL | Age: 33
End: 2023-04-13
Payer: MEDICARE

## 2023-04-13 VITALS
TEMPERATURE: 98.1 F | OXYGEN SATURATION: 93 % | HEART RATE: 104 BPM | HEIGHT: 59 IN | DIASTOLIC BLOOD PRESSURE: 79 MMHG | BODY MASS INDEX: 56.04 KG/M2 | WEIGHT: 278 LBS | RESPIRATION RATE: 18 BRPM | SYSTOLIC BLOOD PRESSURE: 143 MMHG

## 2023-04-13 PROCEDURE — 25010000002 MEASLES, MUMPS & RUBELLA VAC RECONSTITUTED SOLUTION: Performed by: STUDENT IN AN ORGANIZED HEALTH CARE EDUCATION/TRAINING PROGRAM

## 2023-04-13 PROCEDURE — 90707 MMR VACCINE SC: CPT | Performed by: STUDENT IN AN ORGANIZED HEALTH CARE EDUCATION/TRAINING PROGRAM

## 2023-04-13 PROCEDURE — 90471 IMMUNIZATION ADMIN: CPT | Performed by: STUDENT IN AN ORGANIZED HEALTH CARE EDUCATION/TRAINING PROGRAM

## 2023-04-13 RX ORDER — OXYCODONE HYDROCHLORIDE 5 MG/1
5 TABLET ORAL EVERY 6 HOURS PRN
Qty: 12 TABLET | Refills: 0 | Status: SHIPPED | OUTPATIENT
Start: 2023-04-13 | End: 2023-04-16

## 2023-04-13 RX ORDER — NIFEDIPINE 60 MG/1
60 TABLET, FILM COATED, EXTENDED RELEASE ORAL
Qty: 30 TABLET | Refills: 1 | Status: SHIPPED | OUTPATIENT
Start: 2023-04-13

## 2023-04-13 RX ORDER — IBUPROFEN 600 MG/1
600 TABLET ORAL EVERY 6 HOURS
Qty: 28 TABLET | Refills: 0 | Status: SHIPPED | OUTPATIENT
Start: 2023-04-13 | End: 2023-04-20

## 2023-04-13 RX ORDER — ACETAMINOPHEN 500 MG
1000 TABLET ORAL EVERY 6 HOURS
Qty: 56 TABLET | Refills: 0 | Status: SHIPPED | OUTPATIENT
Start: 2023-04-13 | End: 2023-04-20

## 2023-04-13 RX ADMIN — PRENATAL WITH FERROUS FUM AND FOLIC ACID 1 TABLET: 3080; 920; 120; 400; 22; 1.84; 3; 20; 10; 1; 12; 200; 27; 25; 2 TABLET ORAL at 09:30

## 2023-04-13 RX ADMIN — ACETAMINOPHEN 1000 MG: 500 TABLET ORAL at 04:00

## 2023-04-13 RX ADMIN — DOCUSATE SODIUM 100 MG: 100 CAPSULE, LIQUID FILLED ORAL at 09:30

## 2023-04-13 RX ADMIN — MEASLES, MUMPS, AND RUBELLA VIRUS VACCINE LIVE 0.5 ML: 1000; 12500; 1000 INJECTION, POWDER, LYOPHILIZED, FOR SUSPENSION SUBCUTANEOUS at 11:48

## 2023-04-13 RX ADMIN — IBUPROFEN 600 MG: 600 TABLET, FILM COATED ORAL at 11:31

## 2023-04-13 RX ADMIN — IBUPROFEN 600 MG: 600 TABLET, FILM COATED ORAL at 04:40

## 2023-04-13 RX ADMIN — ACETAMINOPHEN 1000 MG: 500 TABLET ORAL at 09:31

## 2023-04-13 RX ADMIN — NIFEDIPINE 60 MG: 60 TABLET, EXTENDED RELEASE ORAL at 09:31

## 2023-04-13 NOTE — PROGRESS NOTES
[facility]   PROGRESS NOTE    Post-Op Day 3 S/P     Subjective:  Patient has no complaints  Pain controlled  Tolerating a regular diet  Passing flatus  Ambulating  Urinating spontaneously  Lochia decreasing, no bleeding concerns  Denies HA, vision change, or RUQ/epigastric pain  No lightheadedness or dizziness  Desires DC home if baby Dc'd    Objective    Objective     Temp:  [98 °F (36.7 °C)-98.5 °F (36.9 °C)] 98.5 °F (36.9 °C)  Heart Rate:  [] 104  Resp:  [18-20] 20  BP: (140-150)/(81-92) 140/82     Physical Exam:  GEN: alert and in no distress; Obese  Abdomen: fundus firm - below the umbilicus  abdomen soft + BS's  appropriately tender  Incision: not dressed, clean, dry, and intact, healing well, no drainage, no erythema, no hernia, no seroma, no swelling, well approximated, steri strips in place  Extremino pedal edema noted, Tadeo's sign negative bilaterally, no redness or tenderness in the calves or thighsties:     Labs:  Lab Results (last 24 hours)     ** No results found for the last 24 hours. **             Assessment & Plan        Preeclampsia    High-risk pregnancy in third trimester    Previous  section    Maternal obesity affecting pregnancy, antepartum    HSV infection    Assessment & Plan    Assessment:    Rosie Bradley is Day 3  post-partum  , Low Vertical   .      Plan:    Routine postpartum/postop care    Continue to advance diet as tolerates    Adequate urine output, continue to monitor    Encourage incentive spirometry    Remove IV    Incision well healing, continue routine care    Contraception: s/p bilateral salpingectomy    Breast-feeding breast    Advance diet, Remove IV, Shower, PO pain meds, Importance of wound care/keep clean and dry, Breast feeding support, Discharge home, DC meds reviewed, Follow up scheduled, PP/PO precautions given, HTN precautions reviewed in detail.  Questions answered.  RTO/ER for HA not relieved w tylenol, vision changes,  epig/RUQ pain, or Bps elevated at home.    Continue Procarida 60XL daily; RTO on 4/20/2023 for BP check. D/C home today.     Parht Power MD  04/13/23  08:45 EDT

## 2023-04-13 NOTE — LACTATION NOTE
LC in to follow up with lactation progress. Patient has been pumping very regularly through the night. She expressed about 2 oz this last pumping session. She states she has no pain with pumping. And no redness or trauma seen. Patient is planning on discharge today. LC discussed normal infant output patterns to expect and if infant is not waking by 3 hours to wake and feed using measures shown in the hospital. LC discussed checking to make sure new medications are safe to breastfeed. LC discussed alcohol use and cigarette/second hand smoke around baby and breastfeeding and discussed the impact of street drugs on infants and breastfeeding. LC used the page in the breastfeeding guide to discuss harmful effects of these. Breastfeeding/Lactation expectations and anticipatory guidance discussed for the next two weeks . LC discussed nipple care, plugged ducts, engorgement, and breast infection. LC encouraged mom to see pediatrician two days from discharge for follow up. Patient has a breastpump for home use and LC discussed good pumping guidelines and normal expectations with pumping and storage and preparation of ebm for feedings. LC discussed breastfeeding/lactation resources including the local breastfeeding support group after discharge and when to call the doctor. Patient showed good understanding.

## 2023-04-13 NOTE — PLAN OF CARE
Problem: Adult Inpatient Plan of Care  Goal: Plan of Care Review  Outcome: Ongoing, Progressing  Flowsheets (Taken 4/13/2023 0510)  Progress: improving  Plan of Care Reviewed With: patient  Outcome Evaluation: Ongoing progress toward goal.  Goal: Patient-Specific Goal (Individualized)  Outcome: Ongoing, Progressing  Goal: Absence of Hospital-Acquired Illness or Injury  Outcome: Ongoing, Progressing  Intervention: Identify and Manage Fall Risk  Recent Flowsheet Documentation  Taken 4/13/2023 0400 by Kelsey Leyva RN  Safety Promotion/Fall Prevention: safety round/check completed  Taken 4/13/2023 0050 by Kelsey Leyva RN  Safety Promotion/Fall Prevention: safety round/check completed  Taken 4/12/2023 2201 by Kelsey Leyva RN  Safety Promotion/Fall Prevention: safety round/check completed  Taken 4/12/2023 2050 by Kelsey Leyva RN  Safety Promotion/Fall Prevention: safety round/check completed  Intervention: Prevent Skin Injury  Recent Flowsheet Documentation  Taken 4/12/2023 2201 by Kelsey Leyva RN  Body Position: position changed independently  Intervention: Prevent and Manage VTE (Venous Thromboembolism) Risk  Recent Flowsheet Documentation  Taken 4/13/2023 0352 by Kelsey Leyva RN  Activity Management: up ad antoine  Taken 4/12/2023 2201 by Kelsey Leyva RN  Activity Management: up ad antoine  Intervention: Prevent Infection  Recent Flowsheet Documentation  Taken 4/12/2023 2201 by Kelsey Leyva RN  Infection Prevention:   cohorting utilized   environmental surveillance performed   equipment surfaces disinfected   hand hygiene promoted   personal protective equipment utilized   rest/sleep promoted   single patient room provided   visitors restricted/screened  Goal: Optimal Comfort and Wellbeing  Outcome: Ongoing, Progressing  Intervention: Provide Person-Centered Care  Recent Flowsheet Documentation  Taken 4/12/2023 2201 by Kelsey Leyva RN  Trust Relationship/Rapport:   care explained   choices provided    emotional support provided   empathic listening provided   questions answered   questions encouraged   reassurance provided   thoughts/feelings acknowledged  Goal: Readiness for Transition of Care  Outcome: Ongoing, Progressing     Problem: Bleeding ( Delivery)  Goal: Bleeding is Controlled  Outcome: Ongoing, Progressing     Problem: Change in Fetal Wellbeing ( Delivery)  Goal: Stable Fetal Wellbeing  Outcome: Ongoing, Progressing  Intervention: Promote and Monitor Fetal Wellbeing  Recent Flowsheet Documentation  Taken 2023 by Kelsey Leyva RN  Body Position: position changed independently     Problem: Infection ( Delivery)  Goal: Absence of Infection Signs and Symptoms  Outcome: Ongoing, Progressing  Intervention: Minimize Infection Risk  Recent Flowsheet Documentation  Taken 2023 by Kelsey Leyva RN  Infection Prevention:   cohorting utilized   environmental surveillance performed   equipment surfaces disinfected   hand hygiene promoted   personal protective equipment utilized   rest/sleep promoted   single patient room provided   visitors restricted/screened     Problem: Respiratory Compromise ( Delivery)  Goal: Effective Oxygenation and Ventilation  Outcome: Ongoing, Progressing     Problem: Breastfeeding  Goal: Effective Breastfeeding  Outcome: Ongoing, Progressing   Goal Outcome Evaluation:  Plan of Care Reviewed With: patient        Progress: improving  Outcome Evaluation: Ongoing progress toward goal.

## 2023-04-13 NOTE — OUTREACH NOTE
[facility]  IP Postpartum    Questions/Answers    Flowsheet Row Responses   Best Method for Contacting Cell   Support Person Present Yes   Does the patient have a car seat at the hospital Yes   Delivery Note Reviewed Reviewed   Were birth expectations met? Yes   Is there a need for additional support/resources? No   Lactation Note Reviewed Reviewed   Is additional support needed? No   Any questions or concerns? No   Is the patient going to use Meds to Beds? Yes   Any concerns related discharge meds/ability to  prescriptions? No   OB Discharge Navigator Reviewed  Reviewed   Confirm Postpartum OB appointment Yes   Postpartum OB appointment date 23   Confirm initial well-child Pediatrician appointment date/time: Yes   Initial Well Child Pediatrician Appointment Date 23   Additional post-discharge F/U appointments No   Does patient have transportation to appointments? Yes  [[facility] has friend and sister to take her and infant to appointments. Encouraged use of TACK. ]   Any other assistance needed to ensure she is able to attend appointments? Yes   Other assistance needed Encouraged to use TACK and to make appointments during times her other child is at school   Does patient have supplies needed at home for  care? Breast Pump, Clothing, Crib, Diapers, Formula          Met with patient and significant other. [facility] is prepared at home for baby. Has place for infant to sleep and reviewed safe sleep practices. Has carseat, clothing, diapers. Has pediatrician appointment tomorrow with Dr Youssef in [place] and states that she does have family/friends to be able to take her to this appointment. Was unable to get pediatrician locally, contacted only one office and was declined. Encouraged to call other local offices to see if they would be able to care for infant so she does not have to travel as far. Is pumping, encouraged to do at least one pumping at night. [facility] has bought  lactations cookies, encouraged to supplement diet with oatmeal in replacement of buying lactation cookies for expense purposes. Reviewed signs of normal milk supply and ways to support supply. Questioned if able to obtain discharge medication and encouraged to reach out if she encounters issues with getting them. Has been doing classes and program at [facility] but has not been able to go since on bedrest. Encouraged to reach out to them to continue program. No other questions, needs or concerns at present time.       Terese Schneider RN  Maternity Nurse Navigator    4/13/2023, 10:27 EDT

## 2023-04-19 ENCOUNTER — PATIENT OUTREACH (OUTPATIENT)
Dept: LABOR AND DELIVERY | Facility: HOSPITAL | Age: 33
End: 2023-04-19
Payer: MEDICARE

## 2023-04-19 NOTE — OUTREACH NOTE
[facility]  Postpartum Check-In    Questions/Answers    Flowsheet Row Responses   Visit Setting Telephone   Best Method for Contacting Cell   OB Discharge Note Reviewed  Reviewed   OB Discharge Navigator Reviewed  Reviewed   OB Discharge Medications Reviewed  Reviewed   [place] discharged home with mother? Yes   Current Pain Levels 0-10 3   At Rest Pain Levels 0-10 3   Pain level with activity 0-10 3   Acceptable Pain Level 0-10 3   Pain Location Back   Pain Description Intermittent   Verbalized Emotional State --  [good]   Family/Support Network Significant Other, Other, Friend   Family/Support Network Comment Neighbor   Level of Involvement in Care Attentive, Interactive, Supportive   Do you feel comfortable in your relationship with your baby? Yes   Have members of your household adjusted to your baby? Yes   Is the baby's father supportive and/or involved with the baby? Yes   How does your partner feel about the baby? Happy, Involved   Do you feel safe at home, school and work? Yes   Are you in a relationship with someone who threatens you or hurts you? No   Do you have the resources to keep yourself and your baby healthy and safe? Yes   Lochia (per patient report) Similar to Menstrual Flow   Number of pads per day 3   Lochia Odor None   Is patient breastfeeding? Yes, pumping   Pumping Quantity 4 oz per side   Postpartum Depression Screening Education Education Provided   Doctor Appointments: Education Provided   Breastfeeding Education Education Provided   Family Planning Education Education Provided   Postpartum Care Education Education Provided   S & S to report Education Provided   Followup Appointments Made Yes   Well Child Visit Appointments Made Yes   Appointment Date 23   Did you complete the visit? Yes   Were there any specific concerns? No   Umbilical Cord No reported signs or symptoms   Was the baby circumcised? Yes   Circumcision care and signs/symptoms to report Reviewed   Infant  Feeding Method Formula, Expressed Breast Milk   Is a lactation referral indicated? No   Formula Type Neosure   Formula PO (mL) 2-3oz   Formula/Expressed Milk frequency of feedings: at night   Expressed milk PO (mL) 2-3oz   Expressed milk- frequency of feedings q2-3 hours   Number of wet diapers x 24 hours 8   Last BM x 24 hours 4   What safe sleep surface is available? Bassinet   Are there stuffed animals, toys, pillows, quilts, blankets, wedges, positioners, bumpers or other loose bedding in the infant's sleeping environment? No   Where does the baby usually sleep? Bassinet   Does the baby ever share a sleep surface with a sibling, adult or pet? No   Does the baby ever share a sleep surface in a bed, couch, recliner or other? No   What position do you place your baby to sleep for naps? Back   What position do you place your baby to sleep at night Back   Are you and/or other caregivers smoking inside or outside the baby's home? No   Is the infant dressed appropiately for the temperature of the home? Yes   Do you use a clean, dry pacifier that is not attached to a string or stuffed animal? Yes          Review of Systems    Most Recent [place]  Depression Scale Score (EPDS)    Performed by a clinician: 2 (2023  7:59 PM)      Doing well. Has excellent support. No current concerns.     Terese Schneider RN  Maternity Nurse Navigator    2023, 15:35 EDT

## 2023-04-22 ENCOUNTER — TELEPHONE (OUTPATIENT)
Dept: LACTATION | Facility: HOSPITAL | Age: 33
End: 2023-04-22
Payer: MEDICARE

## 2023-04-22 NOTE — TELEPHONE ENCOUNTER
Pt states she is exclusively pumping and it it going good, she is pumping 4oz in am and 2-3oz in the pms, baby is taking 2-4oz a feeding, she is still supplementing with formula at night. baby os gaining wt and os back to birth weight. She has no questions or concerns at this time; she was encouraged to reach out to LC as needed.

## 2023-04-26 ENCOUNTER — PATIENT OUTREACH (OUTPATIENT)
Dept: CALL CENTER | Facility: HOSPITAL | Age: 33
End: 2023-04-26
Payer: MEDICARE

## 2023-04-26 ENCOUNTER — POSTPARTUM VISIT (OUTPATIENT)
Dept: OBSTETRICS AND GYNECOLOGY | Facility: CLINIC | Age: 33
End: 2023-04-26
Payer: MEDICARE

## 2023-04-26 VITALS
BODY MASS INDEX: 51.3 KG/M2 | HEART RATE: 111 BPM | DIASTOLIC BLOOD PRESSURE: 83 MMHG | SYSTOLIC BLOOD PRESSURE: 118 MMHG | WEIGHT: 254 LBS

## 2023-04-26 DIAGNOSIS — Z75.8 DOES NOT HAVE PRIMARY CARE PROVIDER: Primary | ICD-10-CM

## 2023-04-26 DIAGNOSIS — Z01.30 BP CHECK: ICD-10-CM

## 2023-04-26 NOTE — OUTREACH NOTE
Motherhood Connection Survey    Flowsheet Row Responses   [facility] patient discharged from? [facility]   Week 1 attempt successful? Yes   Call start time 1454   Call end time 1501   Baby sex Boy   [place] discharged home with mother? Yes   Baby sex Boy   Delivery type    Emotional state Acceptance   Family support Yes   Do you have all necessary resources to care for you and your baby?  Yes   Have members of your household adjusted to your baby? Yes   Did you have any problems with pre-eclampsia during this pregnancy? Yes  [pt on BP med, Nifedipine]   Do you have any of the following: No Issues   Lochia amount Light   Lochia per patient report Rubra   Did you have an episiotomy/tear/abdominal incision? Yes   Additional comments incision healing   Feeding Method Combination   Frequency takes MBM in bottle during day, formula at night   Duration 3-4 oz   Pumping Yes   Supplementing Breast Milk, Formula   Frequency q 3-4 hrs   Breast Condition No   Nipple Condition No   Number of wet diapers x 24 hours 10   Last BM x 24 hours 3-4   Umbilical Cord No reported signs or symptoms   Umbilical cord comments cord off   Was the baby circumcised? Yes   Circumcision care and signs/symptoms to report Reviewed   Circumcision comments healed   Where does the baby usually sleep? Bassinet   Are there stuffed animals, toys, pillows, quilts, blankets, wedges, positioners, bumpers or other loose bedding in the infant's sleeping environment? No   Does the baby ever share a sleep surface in a bed, couch, recliner or other? No   What position do you lay your baby down to sleep? Back   Are you and/or other caregivers smoking inside or outside the baby's home? No   Mom appointment comments: PP f/u done   Baby appointment comments: Peds visit x1, back to BW   Additional comments eager to eat   Call completed? Yes   How satisfied were you with the Motherhood Connection Program? 5            Bella GOMEZ - Registered Nurse

## 2023-04-26 NOTE — PROGRESS NOTES
POSTPARTUM Follow Up Visit    CC:  Postpartum -blood pressure check    HPI:  Rosie Bradley is a 32 y.o.  s/p repeat .  History of preeclampsia in pregnancy. Patient denies any headache, visual disturbances, new onset nausea vomiting, right upper quadrant pain, or new onset swelling.        Antepartum or Postpartum complications:   Patient Active Problem List   Diagnosis   • High-risk pregnancy in third trimester   • Previous  section   • Maternal obesity affecting pregnancy, antepartum   • History of gestational hypertension   • History of gestational diabetes in prior pregnancy, currently pregnant   • Rubella non-immune status, antepartum   • HSV infection   • Unwanted fertility   • Preeclampsia   • Itching   • At risk for gestational diabetes mellitus       Delivery type:   LTCS  Perineum: Intact  Feeding: Breast and bottle     Pain:  No; having lower back pain.   Vaginal Bleeding:  Like light period   Plans for BC:  Bilateral salpingectomy PERFORMED AT [facility]  Last PAP:   Last Completed Pap Smear          PAP SMEAR (Every 3 Years) Next due on 2022  IGP,CtNgTv,Apt HPV,rfx 16 / 18,45                /83   Pulse 111   Wt 115 kg (254 lb)   LMP 2022   Breastfeeding Yes Comment: Breast and pumping  BMI 51.30 kg/m²     Physical Exam  Vitals and nursing note reviewed.   Constitutional:       General: She is not in acute distress.     Appearance: Normal appearance. She is obese. She is not toxic-appearing.   HENT:      Head: Normocephalic and atraumatic.   Eyes:      Extraocular Movements: Extraocular movements intact.      Conjunctiva/sclera: Conjunctivae normal.   Cardiovascular:      Pulses: Normal pulses.   Pulmonary:      Effort: Pulmonary effort is normal.   Abdominal:      General: There is no distension.      Palpations: Abdomen is soft.      Tenderness: There is no abdominal tenderness.   Musculoskeletal:      Cervical back: Normal range of motion.    Skin:     General: Skin is warm and dry.   Neurological:      Mental Status: She is alert and oriented to person, place, and time.   Psychiatric:         Mood and Affect: Mood normal.         Behavior: Behavior normal.         Thought Content: Thought content normal.           ASSESSMENT AND PLAN:  Rosie Bradley is a 32 y.o.  presenting for blood pressure check.  Patient is postoperative 16 days from delivery.  Normotensive blood pressure.  Reports compliance with Procardia 60 XL daily.  Continue with Procardia.  Recommendation for blood pressure cuff at home.  Patient need a primary care physician referral be placed today.  Return to office in 4 weeks for routine postpartum care    Diagnoses and all orders for this visit:    1. Does not have primary care provider (Primary)  -     Ambulatory Referral to Family Practice    2. BP check      Preeclampsia precautions.  BP parameters both low and high discussed with patient.    Referral/Consult: Family medicine    Follow Up:  Return in about 4 weeks (around 2023) for Next scheduled follow up.          Parth Power MD  2023

## 2023-06-01 ENCOUNTER — POSTPARTUM VISIT (OUTPATIENT)
Dept: OBSTETRICS AND GYNECOLOGY | Facility: CLINIC | Age: 33
End: 2023-06-01

## 2023-06-01 VITALS
HEIGHT: 59 IN | SYSTOLIC BLOOD PRESSURE: 137 MMHG | WEIGHT: 253.8 LBS | HEART RATE: 82 BPM | BODY MASS INDEX: 51.16 KG/M2 | DIASTOLIC BLOOD PRESSURE: 85 MMHG

## 2023-06-01 DIAGNOSIS — Z87.59 HISTORY OF GESTATIONAL HYPERTENSION: ICD-10-CM

## 2023-06-01 DIAGNOSIS — E66.01 MORBID OBESITY WITH BMI OF 50.0-59.9, ADULT: ICD-10-CM

## 2023-06-01 DIAGNOSIS — Z75.8 DOES NOT HAVE PRIMARY CARE PROVIDER: ICD-10-CM

## 2023-06-01 PROBLEM — O14.00 ANTEPARTUM MILD PREECLAMPSIA: Status: RESOLVED | Noted: 2023-02-19 | Resolved: 2023-06-01

## 2023-06-01 PROBLEM — L29.9 ITCHING: Status: RESOLVED | Noted: 2023-02-27 | Resolved: 2023-06-01

## 2023-06-01 PROBLEM — O99.210 MATERNAL OBESITY AFFECTING PREGNANCY, ANTEPARTUM: Status: RESOLVED | Noted: 2022-12-08 | Resolved: 2023-06-01

## 2023-06-01 PROBLEM — Z28.39 RUBELLA NON-IMMUNE STATUS, ANTEPARTUM: Status: RESOLVED | Noted: 2022-12-09 | Resolved: 2023-06-01

## 2023-06-01 PROBLEM — O09.93 HIGH-RISK PREGNANCY IN THIRD TRIMESTER: Status: RESOLVED | Noted: 2022-12-08 | Resolved: 2023-06-01

## 2023-06-01 PROBLEM — Z30.09 UNWANTED FERTILITY: Status: RESOLVED | Noted: 2023-02-02 | Resolved: 2023-06-01

## 2023-06-01 PROBLEM — Z91.89 AT RISK FOR GESTATIONAL DIABETES MELLITUS: Status: RESOLVED | Noted: 2023-03-16 | Resolved: 2023-06-01

## 2023-06-01 PROBLEM — Z86.32 HISTORY OF GESTATIONAL DIABETES IN PRIOR PREGNANCY, CURRENTLY PREGNANT: Status: RESOLVED | Noted: 2022-12-08 | Resolved: 2023-06-01

## 2023-06-01 PROBLEM — B00.9 HSV INFECTION: Status: RESOLVED | Noted: 2022-12-20 | Resolved: 2023-06-01

## 2023-06-01 PROBLEM — Z98.891 PREVIOUS CESAREAN SECTION: Status: RESOLVED | Noted: 2022-12-08 | Resolved: 2023-06-01

## 2023-06-01 PROBLEM — O09.899 RUBELLA NON-IMMUNE STATUS, ANTEPARTUM: Status: RESOLVED | Noted: 2022-12-09 | Resolved: 2023-06-01

## 2023-06-01 PROBLEM — O09.299 HISTORY OF GESTATIONAL DIABETES IN PRIOR PREGNANCY, CURRENTLY PREGNANT: Status: RESOLVED | Noted: 2022-12-08 | Resolved: 2023-06-01

## 2023-06-01 RX ORDER — NIFEDIPINE 60 MG/1
60 TABLET, FILM COATED, EXTENDED RELEASE ORAL
Qty: 90 TABLET | OUTPATIENT
Start: 2023-06-01

## 2023-06-01 RX ORDER — NIFEDIPINE 60 MG/1
60 TABLET, FILM COATED, EXTENDED RELEASE ORAL
Qty: 30 TABLET | Refills: 1 | Status: SHIPPED | OUTPATIENT
Start: 2023-06-01

## 2023-06-01 NOTE — PROGRESS NOTES
"POSTPARTUM Follow Up Visit    CC:  Postpartum     HPI:  Rosie Bradley is a 32 y.o.  s/p repeat low-transverse  on 4/10/2023.  Did have first menses last week this is heavier than she is accustomed to.  Resolved yesterday.  Has been compliant taking Procardia 60 XL once a day.  Has not been monitoring blood pressures at home. Patient denies any headache, visual disturbances, new onset nausea vomiting, right upper quadrant pain, or new onset swelling.        Antepartum or Postpartum complications:   Patient Active Problem List   Diagnosis   • Previous  section   • Maternal obesity affecting pregnancy, antepartum   • History of gestational hypertension   • Rubella non-immune status, antepartum   • HSV infection   • Preeclampsia   • Itching       Delivery type:  , Bilateral salpingectomy  Perineum: Intact  Feeding: Breast; concern for supply decreasing.    Pain: No incisional pain having occasional left-sided discomfort with walking  distances.  Does take Tylenol with relief.  Vaginal Bleeding:  No   EPDS score: 3 (2023  9:11 AM)      Plans for BC:  Bilateral salpingectomy PERFORMED AT CS  Last PAP:  NILM/ HPV negative   Last Completed Pap Smear          PAP SMEAR (Every 3 Years) Next due on 2022  IGP,CtNgTv,Apt HPV,rfx 16 / 18,45                /85   Pulse 82   Ht 149.9 cm (59\")   Wt 115 kg (253 lb 12.8 oz)   Breastfeeding Yes Comment: Supplementing  BMI 51.26 kg/m²     Physical Exam  Vitals and nursing note reviewed.   Constitutional:       General: She is not in acute distress.     Appearance: Normal appearance. She is obese. She is not toxic-appearing.   HENT:      Head: Normocephalic and atraumatic.   Eyes:      Extraocular Movements: Extraocular movements intact.      Conjunctiva/sclera: Conjunctivae normal.   Cardiovascular:      Pulses: Normal pulses.   Pulmonary:      Effort: Pulmonary effort is normal.   Abdominal:      General: There is no " distension.      Palpations: Abdomen is soft.      Tenderness: There is no abdominal tenderness. There is no guarding or rebound.      Hernia: No hernia is present.      Comments: Well-healing Pfannenstiel incision   Genitourinary:     Comments: Deferred  Musculoskeletal:      Cervical back: Normal range of motion.   Skin:     General: Skin is warm and dry.   Neurological:      Mental Status: She is alert and oriented to person, place, and time.   Psychiatric:         Mood and Affect: Mood normal.         Behavior: Behavior normal.         Thought Content: Thought content normal.           ASSESSMENT AND PLAN:  Rosie Bradley is a 32 y.o.  presenting for postpartum follow-up.  Status post repeat low-transverse  with bilateral salpingectomy on 4/10/2023 now postoperative day #52.  Recommendation for reaching out to lactation.  Discussed no medication increase milk supply.  Overall doing well.     Diagnoses and all orders for this visit:    1. Postpartum follow-up (Primary)    2. Does not have primary care provider   -Referral made to Barby Canas with family medicine  3.  History of gestational hypertension   -Continue with Procardia 60 XL daily.  Refill provided with 1 addition.  Will need to establish care with primary care physician for additional blood pressure medication   -BP cuff recommended   -Change in insurance, is to establish appointment with Dr. Aramis Jimenez.  4.  Morbid obesity            Counseling:  May resume intercourse  May resume normal activities  Core strengthening exercises reviewed and recommended  Ok to return to work/school    Follow Up:  Return in about 1 year (around 2024) for Annual physical.      Parth Power MD  2023

## 2024-01-20 DIAGNOSIS — Z87.59 HISTORY OF GESTATIONAL HYPERTENSION: ICD-10-CM

## 2024-01-22 RX ORDER — ASPIRIN 81 MG/1
81 TABLET ORAL DAILY
Qty: 90 TABLET | Refills: 3 | OUTPATIENT
Start: 2024-01-22

## 2024-05-07 ENCOUNTER — TELEPHONE (OUTPATIENT)
Dept: OBSTETRICS AND GYNECOLOGY | Facility: CLINIC | Age: 34
End: 2024-05-07
Payer: MEDICARE

## 2024-05-13 ENCOUNTER — OFFICE VISIT (OUTPATIENT)
Dept: INTERNAL MEDICINE | Age: 34
End: 2024-05-13
Payer: MEDICARE

## 2024-05-13 VITALS
OXYGEN SATURATION: 96 % | HEART RATE: 108 BPM | BODY MASS INDEX: 53.02 KG/M2 | WEIGHT: 263 LBS | DIASTOLIC BLOOD PRESSURE: 90 MMHG | HEIGHT: 59 IN | TEMPERATURE: 97.5 F | SYSTOLIC BLOOD PRESSURE: 140 MMHG

## 2024-05-13 DIAGNOSIS — E66.01 MORBID OBESITY WITH BMI OF 50.0-59.9, ADULT: ICD-10-CM

## 2024-05-13 DIAGNOSIS — R73.03 PREDIABETES: ICD-10-CM

## 2024-05-13 DIAGNOSIS — R03.0 ELEVATED BLOOD PRESSURE READING: ICD-10-CM

## 2024-05-13 DIAGNOSIS — R10.11 RIGHT UPPER QUADRANT PAIN: Primary | ICD-10-CM

## 2024-05-13 PROCEDURE — 99204 OFFICE O/P NEW MOD 45 MIN: CPT | Performed by: NURSE PRACTITIONER

## 2024-05-13 PROCEDURE — 1159F MED LIST DOCD IN RCRD: CPT | Performed by: NURSE PRACTITIONER

## 2024-05-13 PROCEDURE — 1160F RVW MEDS BY RX/DR IN RCRD: CPT | Performed by: NURSE PRACTITIONER

## 2024-05-13 NOTE — PROGRESS NOTES
"Chief Complaint  Establish Care (New patient. The patient has no concerns. )  Subjective      History of Present Illness  Rosie Bradley is a 33 y.o. female who presents to [facility] to establish primary care. She has not had a PCP since being a teenager. She is followed by gyn. Since having children she has pressure in her epigastric area and pain on the right side that comes and goes and she has noticed this after eating.    Patient Care Team:  Tatyana Disla APRN as PCP - General (Nurse Practitioner)    Past Medical History:   Diagnosis Date    Gestational diabetes     HSV infection 2022    Obesity     Pre-diabetes     boarderline diabetes    Preeclampsia     Previous  delivery, antepartum 2022    Delivered at 36w6d due to gestational hypertension    Scoliosis         Past Surgical History:   Procedure Laterality Date     SECTION       SECTION WITH TUBAL N/A 4/10/2023    Procedure:  SECTION REPEAT WITH TUBAL;  Surgeon: Parth Power MD;  Location: [facility] LABOR DELIVERY;  Service: Gynecology;  Laterality: N/A;    MANDIBLE FRACTURE SURGERY      TONSILLECTOMY      WISDOM TOOTH EXTRACTION          No Known Allergies     No current outpatient medications on file.    Objective   /90 (BP Location: Right arm, Patient Position: Sitting, Cuff Size: Adult)   Pulse 108   Temp 97.5 °F (36.4 °C) (Temporal)   Ht 149.9 cm (59\")   Wt 119 kg (263 lb)   LMP 2024 (Exact Date)   SpO2 96%   Breastfeeding No   BMI 53.12 kg/m²    Estimated body mass index is 53.12 kg/m² as calculated from the following:    Height as of this encounter: 149.9 cm (59\").    Weight as of this encounter: 119 kg (263 lb).   Physical Exam  Vitals reviewed.   Constitutional:       General: She is not in acute distress.     Appearance: She is obese.   HENT:      Head: Normocephalic and atraumatic.   Cardiovascular:      Rate and Rhythm: Normal rate and " regular rhythm.      Heart sounds: Normal heart sounds.   Pulmonary:      Effort: Pulmonary effort is normal.      Breath sounds: Normal breath sounds. No wheezing, rhonchi or rales.   Abdominal:      General: There is no distension.      Palpations: Abdomen is soft. There is no mass.      Tenderness: There is no abdominal tenderness.   Skin:     General: Skin is warm and dry.   Neurological:      General: No focal deficit present.      Mental Status: She is alert.   Psychiatric:         Thought Content: Thought content normal.        Result Review :  The following data was reviewed by: FAHEEM Escobar on 05/13/2024:    Data reviewed : Consultant notes Postpartum Visit with Parth Power MD (06/01/2023)             Assessment and Plan   Diagnoses and all orders for this visit:    1. Right upper quadrant pain (Primary)  -     US Gallbladder; Future  -     Lipase; Future  -     Folate; Future  -     Vitamin B12; Future  -     Amylase; Future    2. Morbid obesity with BMI of 50.0-59.9, adult  -     Lipid Panel; Future  -     T4, Free; Future  -     TSH; Future  -     Vitamin D,25-Hydroxy; Future  -     Folate; Future  -     Vitamin B12; Future    3. Prediabetes  -     Comprehensive Metabolic Panel; Future  -     CBC & Differential; Future  -     Lipid Panel; Future  -     T4, Free; Future  -     TSH; Future  -     Hemoglobin A1c; Future  -     Folate; Future  -     Vitamin B12; Future    4. Elevated blood pressure reading      Right upper quadrant pain: Differential diagnosis discussed including gallbladder disease, reflux, etc. the patient will have an ultrasound of the gallbladder and labs and return for follow-up.    Class 3 Severe Obesity (BMI >=40). Obesity-related health conditions include the following: none. Obesity is unchanged. BMI is is above average; BMI management plan is completed. We discussed portion control and increasing exercise.    Prediabetes: Check HA1C and labs and return for  results.    Elevated blood pressure reading: Blood pressure slightly elevated in office today.  The patient states insurance would not cover a blood pressure cuff and she does not have one at home.  Will check at next visit.     Patient was given instructions and counseling regarding her condition. Please see specific information pulled into the AVS if appropriate.     Follow Up   Return in about 4 weeks (around 6/10/2024) for Medicare Wellness.    Dictated Utilizing Dragon Dictation.  Please note that portions of this note were completed with a voice recognition program.  Part of this note may be an electronic transcription/translation of spoken language to printed text using the Dragon Dictation System.    FAHEEM Escobar

## 2024-05-20 ENCOUNTER — PATIENT ROUNDING (BHMG ONLY) (OUTPATIENT)
Dept: INTERNAL MEDICINE | Age: 34
End: 2024-05-20
Payer: MEDICARE

## 2024-05-31 ENCOUNTER — HOSPITAL ENCOUNTER (OUTPATIENT)
Dept: ULTRASOUND IMAGING | Facility: HOSPITAL | Age: 34
Discharge: HOME | End: 2024-05-31
Payer: MEDICARE

## 2024-05-31 ENCOUNTER — LAB (OUTPATIENT)
Dept: LAB | Facility: HOSPITAL | Age: 34
End: 2024-05-31
Payer: MEDICARE

## 2024-05-31 DIAGNOSIS — E66.01 MORBID OBESITY WITH BMI OF 50.0-59.9, ADULT: ICD-10-CM

## 2024-05-31 DIAGNOSIS — R73.03 PREDIABETES: ICD-10-CM

## 2024-05-31 DIAGNOSIS — R10.11 RIGHT UPPER QUADRANT PAIN: Primary | ICD-10-CM

## 2024-05-31 DIAGNOSIS — R10.11 RIGHT UPPER QUADRANT PAIN: ICD-10-CM

## 2024-05-31 DIAGNOSIS — K81.9 CHOLECYSTITIS: ICD-10-CM

## 2024-05-31 DIAGNOSIS — K80.80 OTHER CHOLELITHIASIS WITHOUT OBSTRUCTION: ICD-10-CM

## 2024-05-31 LAB
25(OH)D3 SERPL-MCNC: 27.1 NG/ML (ref 30–100)
ALBUMIN SERPL-MCNC: 4.5 G/DL (ref 3.5–5.2)
ALBUMIN/GLOB SERPL: 1.2 G/DL
ALP SERPL-CCNC: 119 U/L (ref 39–117)
ALT SERPL W P-5'-P-CCNC: 18 U/L (ref 1–33)
AMYLASE SERPL-CCNC: 76 U/L (ref 28–100)
ANION GAP SERPL CALCULATED.3IONS-SCNC: 12 MMOL/L (ref 5–15)
AST SERPL-CCNC: 15 U/L (ref 1–32)
BASOPHILS # BLD AUTO: 0.02 10*3/MM3 (ref 0–0.2)
BASOPHILS NFR BLD AUTO: 0.2 % (ref 0–1.5)
BILIRUB SERPL-MCNC: 0.3 MG/DL (ref 0–1.2)
BUN SERPL-MCNC: 17 MG/DL (ref 6–20)
BUN/CREAT SERPL: 25.8 (ref 7–25)
CALCIUM SPEC-SCNC: 9.4 MG/DL (ref 8.6–10.5)
CHLORIDE SERPL-SCNC: 104 MMOL/L (ref 98–107)
CHOLEST SERPL-MCNC: 241 MG/DL (ref 0–200)
CO2 SERPL-SCNC: 23 MMOL/L (ref 22–29)
CREAT SERPL-MCNC: 0.66 MG/DL (ref 0.57–1)
DEPRECATED RDW RBC AUTO: 39.9 FL (ref 37–54)
EGFRCR SERPLBLD CKD-EPI 2021: 117.5 ML/MIN/1.73
EOSINOPHIL # BLD AUTO: 0.23 10*3/MM3 (ref 0–0.4)
EOSINOPHIL NFR BLD AUTO: 2.8 % (ref 0.3–6.2)
ERYTHROCYTE [DISTWIDTH] IN BLOOD BY AUTOMATED COUNT: 12.7 % (ref 12.3–15.4)
FOLATE SERPL-MCNC: 8.26 NG/ML (ref 4.78–24.2)
GLOBULIN UR ELPH-MCNC: 3.7 GM/DL
GLUCOSE SERPL-MCNC: 108 MG/DL (ref 65–99)
HBA1C MFR BLD: 5.5 % (ref 4.8–5.6)
HCT VFR BLD AUTO: 41.8 % (ref 34–46.6)
HDLC SERPL-MCNC: 41 MG/DL (ref 40–60)
HGB BLD-MCNC: 14.4 G/DL (ref 12–15.9)
IMM GRANULOCYTES # BLD AUTO: 0.04 10*3/MM3 (ref 0–0.05)
IMM GRANULOCYTES NFR BLD AUTO: 0.5 % (ref 0–0.5)
LDLC SERPL CALC-MCNC: 130 MG/DL (ref 0–100)
LDLC/HDLC SERPL: 2.96 {RATIO}
LIPASE SERPL-CCNC: 51 U/L (ref 13–60)
LYMPHOCYTES # BLD AUTO: 2.84 10*3/MM3 (ref 0.7–3.1)
LYMPHOCYTES NFR BLD AUTO: 34.3 % (ref 19.6–45.3)
MCH RBC QN AUTO: 30.1 PG (ref 26.6–33)
MCHC RBC AUTO-ENTMCNC: 34.4 G/DL (ref 31.5–35.7)
MCV RBC AUTO: 87.4 FL (ref 79–97)
MONOCYTES # BLD AUTO: 0.52 10*3/MM3 (ref 0.1–0.9)
MONOCYTES NFR BLD AUTO: 6.3 % (ref 5–12)
NEUTROPHILS NFR BLD AUTO: 4.63 10*3/MM3 (ref 1.7–7)
NEUTROPHILS NFR BLD AUTO: 55.9 % (ref 42.7–76)
NRBC BLD AUTO-RTO: 0 /100 WBC (ref 0–0.2)
PLATELET # BLD AUTO: 252 10*3/MM3 (ref 140–450)
PMV BLD AUTO: 10.6 FL (ref 6–12)
POTASSIUM SERPL-SCNC: 4.3 MMOL/L (ref 3.5–5.2)
PROT SERPL-MCNC: 8.2 G/DL (ref 6–8.5)
RBC # BLD AUTO: 4.78 10*6/MM3 (ref 3.77–5.28)
SODIUM SERPL-SCNC: 139 MMOL/L (ref 136–145)
T4 FREE SERPL-MCNC: 1.04 NG/DL (ref 0.92–1.68)
TRIGL SERPL-MCNC: 393 MG/DL (ref 0–150)
TSH SERPL DL<=0.05 MIU/L-ACNC: 1.22 UIU/ML (ref 0.27–4.2)
VIT B12 BLD-MCNC: 438 PG/ML (ref 211–946)
VLDLC SERPL-MCNC: 70 MG/DL (ref 5–40)
WBC NRBC COR # BLD AUTO: 8.28 10*3/MM3 (ref 3.4–10.8)

## 2024-05-31 PROCEDURE — 36415 COLL VENOUS BLD VENIPUNCTURE: CPT

## 2024-05-31 PROCEDURE — 85025 COMPLETE CBC W/AUTO DIFF WBC: CPT

## 2024-05-31 PROCEDURE — 84443 ASSAY THYROID STIM HORMONE: CPT

## 2024-05-31 PROCEDURE — 80061 LIPID PANEL: CPT

## 2024-05-31 PROCEDURE — 84439 ASSAY OF FREE THYROXINE: CPT

## 2024-05-31 PROCEDURE — 83690 ASSAY OF LIPASE: CPT

## 2024-05-31 PROCEDURE — 82150 ASSAY OF AMYLASE: CPT

## 2024-05-31 PROCEDURE — 83036 HEMOGLOBIN GLYCOSYLATED A1C: CPT

## 2024-05-31 PROCEDURE — 76705 ECHO EXAM OF ABDOMEN: CPT

## 2024-05-31 PROCEDURE — 82746 ASSAY OF FOLIC ACID SERUM: CPT

## 2024-05-31 PROCEDURE — 80053 COMPREHEN METABOLIC PANEL: CPT

## 2024-05-31 PROCEDURE — 82306 VITAMIN D 25 HYDROXY: CPT

## 2024-05-31 PROCEDURE — 82607 VITAMIN B-12: CPT

## 2024-06-10 ENCOUNTER — TELEPHONE (OUTPATIENT)
Dept: OBSTETRICS AND GYNECOLOGY | Facility: CLINIC | Age: 34
End: 2024-06-10
Payer: MEDICARE

## 2024-06-11 ENCOUNTER — OFFICE VISIT (OUTPATIENT)
Dept: INTERNAL MEDICINE | Age: 34
End: 2024-06-11
Payer: MEDICARE

## 2024-06-11 VITALS
HEART RATE: 112 BPM | BODY MASS INDEX: 52.33 KG/M2 | TEMPERATURE: 97.3 F | OXYGEN SATURATION: 97 % | SYSTOLIC BLOOD PRESSURE: 140 MMHG | DIASTOLIC BLOOD PRESSURE: 100 MMHG | WEIGHT: 259.6 LBS | HEIGHT: 59 IN

## 2024-06-11 DIAGNOSIS — E66.01 MORBID OBESITY WITH BMI OF 50.0-59.9, ADULT: ICD-10-CM

## 2024-06-11 DIAGNOSIS — R10.11 RIGHT UPPER QUADRANT PAIN: ICD-10-CM

## 2024-06-11 DIAGNOSIS — R73.03 PREDIABETES: ICD-10-CM

## 2024-06-11 DIAGNOSIS — I10 PRIMARY HYPERTENSION: ICD-10-CM

## 2024-06-11 DIAGNOSIS — E78.2 MIXED HYPERLIPIDEMIA: ICD-10-CM

## 2024-06-11 DIAGNOSIS — E55.9 VITAMIN D DEFICIENCY: ICD-10-CM

## 2024-06-11 DIAGNOSIS — Z00.00 MEDICARE ANNUAL WELLNESS VISIT, SUBSEQUENT: Primary | ICD-10-CM

## 2024-06-11 DIAGNOSIS — K76.0 HEPATIC STEATOSIS: ICD-10-CM

## 2024-06-11 PROBLEM — K58.9 IRRITABLE BOWEL SYNDROME (IBS): Status: ACTIVE | Noted: 2024-06-11

## 2024-06-11 PROCEDURE — 3077F SYST BP >= 140 MM HG: CPT | Performed by: NURSE PRACTITIONER

## 2024-06-11 PROCEDURE — 96160 PT-FOCUSED HLTH RISK ASSMT: CPT | Performed by: NURSE PRACTITIONER

## 2024-06-11 PROCEDURE — 1170F FXNL STATUS ASSESSED: CPT | Performed by: NURSE PRACTITIONER

## 2024-06-11 PROCEDURE — 3080F DIAST BP >= 90 MM HG: CPT | Performed by: NURSE PRACTITIONER

## 2024-06-11 PROCEDURE — 99214 OFFICE O/P EST MOD 30 MIN: CPT | Performed by: NURSE PRACTITIONER

## 2024-06-11 PROCEDURE — 1159F MED LIST DOCD IN RCRD: CPT | Performed by: NURSE PRACTITIONER

## 2024-06-11 PROCEDURE — G0439 PPPS, SUBSEQ VISIT: HCPCS | Performed by: NURSE PRACTITIONER

## 2024-06-11 PROCEDURE — 1160F RVW MEDS BY RX/DR IN RCRD: CPT | Performed by: NURSE PRACTITIONER

## 2024-06-11 RX ORDER — ATORVASTATIN CALCIUM 20 MG/1
20 TABLET, FILM COATED ORAL DAILY
Qty: 90 TABLET | Refills: 1 | Status: SHIPPED | OUTPATIENT
Start: 2024-06-11

## 2024-06-11 RX ORDER — MELATONIN
1000 DAILY
Qty: 90 TABLET | Refills: 3 | Status: SHIPPED | OUTPATIENT
Start: 2024-06-11

## 2024-06-11 RX ORDER — LOSARTAN POTASSIUM 25 MG/1
25 TABLET ORAL DAILY
Qty: 30 TABLET | Refills: 0 | Status: SHIPPED | OUTPATIENT
Start: 2024-06-11 | End: 2024-06-11

## 2024-06-11 RX ORDER — NIFEDIPINE 60 MG/1
60 TABLET, EXTENDED RELEASE ORAL DAILY
Qty: 90 TABLET | Refills: 1 | Status: SHIPPED | OUTPATIENT
Start: 2024-06-11

## 2024-06-11 NOTE — PROGRESS NOTES
Obesity the ABCs of the Annual Wellness Visit  Subsequent Medicare Wellness Visit    Subjective    Rosie Bradley is a 33 y.o. female who presents for a Subsequent Medicare Wellness Visit.    The following portions of the patient's history were reviewed and   updated as appropriate: allergies, current medications, past family history, past medical history, past social history, past surgical history, and problem list.    Compared to one year ago, the patient feels her physical   health is the same.    Compared to one year ago, the patient feels her mental   health is the same.    Recent Hospitalizations:  She was not admitted to the hospital during the last year.       Current Medical Providers:  Patient Care Team:  Tatyana Disla APRN as PCP - General (Nurse Practitioner)  Parth Power MD as Consulting Physician (Obstetrics and Gynecology)    No outpatient medications prior to visit.     No facility-administered medications prior to visit.       No opioid medication identified on active medication list. I have reviewed chart for other potential  high risk medication/s and harmful drug interactions in the elderly.        Aspirin is not on active medication list.  Aspirin use is not indicated based on review of current medical condition/s. Risk of harm outweighs potential benefits.  .    Patient Active Problem List   Diagnosis    History of gestational hypertension    Morbid obesity with BMI of 50.0-59.9, adult    Primary hypertension    Prediabetes    Vitamin D deficiency    Hepatic steatosis    Mixed hyperlipidemia    Irritable bowel syndrome (IBS)     Advance Care Planning   Advance Care Planning     Advance Directive is not on file.  ACP discussion was declined by the patient. Patient does not have an advance directive, declines further assistance.     Objective    Vitals:    06/11/24 1250   BP: 140/100   BP Location: Right arm   Patient Position: Sitting   Cuff Size: Large Adult   Pulse: 112   Temp: 97.3  "°F (36.3 °C)   TempSrc: Temporal   SpO2: 97%   Weight: 118 kg (259 lb 9.6 oz)   Height: 149.9 cm (59\")     Estimated body mass index is 52.43 kg/m² as calculated from the following:    Height as of this encounter: 149.9 cm (59\").    Weight as of this encounter: 118 kg (259 lb 9.6 oz).           Does the patient have evidence of cognitive impairment? No    Lab Results   Component Value Date    TRIG 393 (H) 2024    HDL 41 2024     (H) 2024    VLDL 70 (H) 2024    HGBA1C 5.50 2024        HEALTH RISK ASSESSMENT    Smoking Status:  Social History     Tobacco Use   Smoking Status Never   Smokeless Tobacco Never     Alcohol Consumption:  Social History     Substance and Sexual Activity   Alcohol Use Never     Fall Risk Screen:    MATEO Fall Risk Assessment was completed, and patient is at LOW risk for falls.Assessment completed on:2024    Depression Screenin/11/2024     2:21 PM   PHQ-2/PHQ-9 Depression Screening   Little Interest or Pleasure in Doing Things 0-->not at all   Feeling Down, Depressed or Hopeless 0-->not at all   PHQ-9: Brief Depression Severity Measure Score 0       Health Habits and Functional and Cognitive Screenin/11/2024     2:21 PM   Functional & Cognitive Status   Do you have difficulty preparing food and eating? No   Do you have difficulty bathing yourself, getting dressed or grooming yourself? No   Do you have difficulty using the toilet? No   Do you have difficulty moving around from place to place? No   Do you have trouble with steps or getting out of a bed or a chair? No   Current Diet Well Balanced Diet   Dental Exam Not up to date   Eye Exam Not up to date   Exercise (times per week) 8 times per week   Current Exercises Include Dancing;Home Exercise Program (TV, Computer, Etc.);Walking   Do you need help using the phone?  No   Are you deaf or do you have serious difficulty hearing?  No   Do you need help to go to places out of walking " distance? Yes   Do you need help shopping? Yes   Do you need help preparing meals?  No   Do you need help with housework?  No   Do you need help with laundry? No   Do you need help taking your medications? No   Do you need help managing money? No   Do you ever drive or ride in a car without wearing a seat belt? No   Have you felt unusual stress, anger or loneliness in the last month? No   Who do you live with? Other   If you need help, do you have trouble finding someone available to you? No   Have you been bothered in the last four weeks by sexual problems? No   Do you have difficulty concentrating, remembering or making decisions? No       Age-appropriate Screening Schedule:  Refer to the list below for future screening recommendations based on patient's age, sex and/or medical conditions. Orders for these recommended tests are listed in the plan section. The patient has been provided with a written plan.    Health Maintenance   Topic Date Due    TDAP/TD VACCINES (2 - Tdap) 10/22/2013    COVID-19 Vaccine (2 - 2023-24 season) 09/01/2023    INFLUENZA VACCINE  08/01/2024    LIPID PANEL  05/31/2025    ANNUAL WELLNESS VISIT  06/11/2025    BMI FOLLOWUP  06/11/2025    PAP SMEAR  12/08/2025    HEPATITIS C SCREENING  Completed    Pneumococcal Vaccine 0-64  Aged Out                  CMS Preventative Services Quick Reference  Risk Factors Identified During Encounter  Immunizations Discussed/Encouraged: Tdap, Influenza, and COVID19  The above risks/problems have been discussed with the patient.  Pertinent information has been shared with the patient in the After Visit Summary.  An After Visit Summary and PPPS were made available to the patient.    Follow Up:   Next Medicare Wellness visit to be scheduled in 1 year.       Additional E&M Note during same encounter follows:  Patient has multiple medical problems which are significant and separately identifiable that require additional work above and beyond the Medicare Wellness  "Visit.      Chief Complaint  Medicare Wellness-subsequent (The patient is coming in for a  medicare wellness. )    Subjective        HPI  Rosie Bradley is also being seen today for follow-up elevated blood pressure reading, prediabetes, obesity and upper right quadrant pain.  Recent labs reviewed.  The patient has not taken blood pressure medication since August 2023.  The patient has an upcoming appointment with Dr. Yonatan Gunn for evaluation of right upper quadrant pain and gallbladder ultrasound showing gallstones.         Objective   Vital Signs: Follow-up prediabetes  /100 (BP Location: Right arm, Patient Position: Sitting, Cuff Size: Large Adult)   Pulse 112   Temp 97.3 °F (36.3 °C) (Temporal)   Ht 149.9 cm (59\")   Wt 118 kg (259 lb 9.6 oz)   SpO2 97%   BMI 52.43 kg/m²     Physical Exam  Vitals reviewed.   Constitutional:       General: She is not in acute distress.     Appearance: She is obese.   HENT:      Head: Normocephalic and atraumatic.   Eyes:      Conjunctiva/sclera: Conjunctivae normal.   Cardiovascular:      Rate and Rhythm: Normal rate and regular rhythm.      Heart sounds: Normal heart sounds. No murmur heard.  Pulmonary:      Effort: Pulmonary effort is normal.      Breath sounds: Normal breath sounds. No wheezing, rhonchi or rales.   Musculoskeletal:      Right lower leg: No edema.      Left lower leg: No edema.   Lymphadenopathy:      Cervical: No cervical adenopathy.   Skin:     General: Skin is warm and dry.      Coloration: Skin is not jaundiced or pale.   Neurological:      General: No focal deficit present.      Mental Status: She is alert.   Psychiatric:         Mood and Affect: Mood normal.         Thought Content: Thought content normal.          The following data was reviewed by: FAHEEM Escobar on 06/11/2024:  Common labs          5/31/2024    12:01   Common Labs   Glucose 108    BUN 17    Creatinine 0.66    Sodium 139    Potassium 4.3    Chloride 104    Calcium " 9.4    Albumin 4.5    Total Bilirubin 0.3    Alkaline Phosphatase 119    AST (SGOT) 15    ALT (SGPT) 18    WBC 8.28    Hemoglobin 14.4    Hematocrit 41.8    Platelets 252    Total Cholesterol 241    Triglycerides 393    HDL Cholesterol 41    LDL Cholesterol  130    Hemoglobin A1C 5.50      Data reviewed : Radiologic studies US Gallbladder (05/31/2024 12:48)      There is cholelithiasis without definite evidence of acute  cholecystitis by sonographic criteria. Wall thickening is nonspecific  but can be seen with chronic inflammation.     Mild hepatic steatosis is questioned on limited imaging of the liver.  Please correlate with liver function test     Assessment and Plan   Diagnoses and all orders for this visit:    1. Medicare annual wellness visit, subsequent (Primary)    2. Primary hypertension  -     Comprehensive Metabolic Panel; Future  -     CBC & Differential; Future  -     T4, Free; Future  -     TSH; Future    3. Mixed hyperlipidemia  -     Comprehensive Metabolic Panel; Future  -     Lipid Panel; Future    4. Prediabetes  -     Hemoglobin A1c; Future    5. Vitamin D deficiency  -     Vitamin D,25-Hydroxy; Future    6. Right upper quadrant pain    7. Hepatic steatosis    8. Morbid obesity with BMI of 50.0-59.9, adult    Other orders  -     cholecalciferol (Vitamin D, Cholecalciferol,) 25 MCG (1000 UT) tablet; Take 1 tablet by mouth Daily.  Dispense: 90 tablet; Refill: 3  -     Discontinue: losartan (Cozaar) 25 MG tablet; Take 1 tablet by mouth Daily.  Dispense: 30 tablet; Refill: 0  -     NIFEdipine XL (Procardia XL) 60 MG 24 hr tablet; Take 1 tablet by mouth Daily.  Dispense: 90 tablet; Refill: 1  -     atorvastatin (LIPITOR) 20 MG tablet; Take 1 tablet by mouth Daily.  Dispense: 90 tablet; Refill: 1    Annual wellness exam: Care gaps reviewed.    Hypertension: Restart Procardia XL 60 mg daily.  Patient reports that this controlled her blood pressure in the past.  The patient reports insurance does not  pay for blood pressure monitor.  She will follow-up in 2 to 3 weeks for blood pressure recheck.    Mixed hyperlipidemia: Triglycerides and cholesterol elevated.  Start atorvastatin 20 mg daily and recheck labs in 6 months.    Prediabetes: HA1C normal.  Monitor.    Vitamin D deficiency: Level has been a little low and patient to start vitamin D 1000 units daily.  Prescription sent to pharmacy.    Right upper quadrant pain: The patient continues to have intermittent pain in her upper right quadrant.  Amylase, lipase, CBC, liver enzymes and bilirubin normal.  Gallbladder ultrasound-cholelithiasis without definite evidence of acute cholecystitis.    Hepatic steatosis: Informed patient of finding on ultrasound.  Liver enzymes normal.  Monitor.    Morbid obesity with BMI 52.43: Information on diet and exercise.       Follow Up   Return in about 2 weeks (around 6/25/2024) for Recheck.  Patient was given instructions and counseling regarding her condition or for health maintenance advice. Please see specific information pulled into the AVS if appropriate.

## 2024-06-18 ENCOUNTER — TELEPHONE (OUTPATIENT)
Dept: SURGERY | Facility: CLINIC | Age: 34
End: 2024-06-18

## 2024-06-18 ENCOUNTER — PREP FOR SURGERY (OUTPATIENT)
Dept: OTHER | Facility: HOSPITAL | Age: 34
End: 2024-06-18
Payer: MEDICARE

## 2024-06-18 ENCOUNTER — OFFICE VISIT (OUTPATIENT)
Dept: SURGERY | Facility: CLINIC | Age: 34
End: 2024-06-18
Payer: MEDICARE

## 2024-06-18 VITALS
DIASTOLIC BLOOD PRESSURE: 87 MMHG | SYSTOLIC BLOOD PRESSURE: 141 MMHG | BODY MASS INDEX: 52.21 KG/M2 | HEIGHT: 59 IN | WEIGHT: 259 LBS | HEART RATE: 100 BPM

## 2024-06-18 DIAGNOSIS — K80.20 SYMPTOMATIC CHOLELITHIASIS: Primary | ICD-10-CM

## 2024-06-18 PROCEDURE — 99203 OFFICE O/P NEW LOW 30 MIN: CPT | Performed by: SURGERY

## 2024-06-18 PROCEDURE — 3077F SYST BP >= 140 MM HG: CPT | Performed by: SURGERY

## 2024-06-18 PROCEDURE — 3079F DIAST BP 80-89 MM HG: CPT | Performed by: SURGERY

## 2024-06-18 PROCEDURE — 1159F MED LIST DOCD IN RCRD: CPT | Performed by: SURGERY

## 2024-06-18 PROCEDURE — 1160F RVW MEDS BY RX/DR IN RCRD: CPT | Performed by: SURGERY

## 2024-06-18 RX ORDER — LOSARTAN POTASSIUM 25 MG/1
1 TABLET ORAL DAILY
COMMUNITY
Start: 2024-06-11

## 2024-06-18 RX ORDER — INDOCYANINE GREEN AND WATER 25 MG
2.5 KIT INJECTION
OUTPATIENT
Start: 2024-06-18

## 2024-06-18 NOTE — H&P (VIEW-ONLY)
Chief Complaint:  NEW PATIENT-RUQ PAIN    Primary Care Provider: Tatyana Disla APRN    Referring Provider: Tatyana Disla APRN    History of Present Illness  Rosie Bradley is a 33 y.o. female referred by FAHEEM Escobar for possible gallbladder removal.  For the past at least 6 months the patient has been having occasional right upper quadrant pain and epigastric pain.  She has occasional nausea and vomiting when the pain occurs.  The pain is now occurring at least 1 time a week.  She has woken up several times at night and almost went to the emergency room but the pain gradually resolved before she did so.  She was evaluated with gallbladder ultrasound.  See below.  Surgical history includes  x 2.  One was with a vertically oriented incision.    Gallbladder U/S, 24:  There is cholelithiasis without definite evidence of acute cholecystitis by sonographic criteria. Wall thickening is nonspecific but can be seen with chronic inflammation.    Allergies: Patient has no known allergies.    Outpatient Medications Marked as Taking for the 24 encounter (Office Visit) with Yonatan Gunn MD   Medication Sig Dispense Refill    atorvastatin (LIPITOR) 20 MG tablet Take 1 tablet by mouth Daily. 90 tablet 1    losartan (COZAAR) 25 MG tablet Take 1 tablet by mouth Daily.      NIFEdipine XL (Procardia XL) 60 MG 24 hr tablet Take 1 tablet by mouth Daily. 90 tablet 1       Past Medical History:    Cholelithiasis    Gestational diabetes    Hepatic steatosis    HSV infection    Irritable bowel syndrome (IBS)    Mixed hyperlipidemia    Obesity    Pre-diabetes    boarderline diabetes    Prediabetes    Preeclampsia    Previous  delivery, antepartum    Delivered at 36w6d due to gestational hypertension    Primary hypertension    Scoliosis    Vitamin D deficiency        Past Surgical History:     SECTION     SECTION WITH TUBAL    Procedure:  SECTION REPEAT WITH TUBAL;   "Surgeon: Parth Power MD;  Location: [facility] LABOR DELIVERY;  Service: Gynecology;  Laterality: N/A;    MANDIBLE FRACTURE SURGERY    TONSILLECTOMY    WISDOM TOOTH EXTRACTION       Family History:   Family History   Problem Relation Age of Onset    Diabetes Mother     Hypertension Sister     Ovarian cancer Neg Hx     Uterine cancer Neg Hx     Breast cancer Neg Hx     Colon cancer Neg Hx     Prostate cancer Neg Hx     Melanoma Neg Hx         Social History:  Social History     Tobacco Use    Smoking status: Never    Smokeless tobacco: Never   Substance Use Topics    Alcohol use: Never       Objective     Vital Signs:  /87 (BP Location: Right arm, Patient Position: Sitting, Cuff Size: Large Adult)   Pulse 100   Ht 149.9 cm (59.02\")   Wt 117 kg (259 lb)   BMI 52.28 kg/m²   Respiratory:  breathing not labored, respiratory effort appears normal  Cardiovascular:  heart regular rate  Abdomen:  soft, nontender, nondistended    Skin and subcutaneous tissue:  no jaundice  Musculoskeletal: moving all extremities symmetrically and purposefully  Neurologic:  no obvious motor or sensory deficits, alert & oriented x 3, speech clear  Psychiatric:  judgment and insight intact      Assessment:  Diagnoses and all orders for this visit:    1. Symptomatic cholelithiasis (Primary)        Plan:  Laparoscopic cholecystectomy with cholangiogram    Discussion: Indications, options, risks, benefits, and expected outcomes of planned surgery were discussed with the patient and she agrees to proceed.    Yonatan Gunn MD  06/18/2024    Electronically signed by Yonatan Gunn MD, 06/18/24, 7:43 AM EDT.      "

## 2024-06-18 NOTE — TELEPHONE ENCOUNTER
PATIENT'S SISTER, PATRICIA BURTON, CALLED.  SHE WANTS TO KNOW HOW LONG PATIENT WILL NEED TO HAVE HELP AFTER HER GALLBLADDER SURGERY ON 06/27/24.    SHE WANTS TO KNOW, SO SHE CAN TAKE OFF WORK TO HELP HER AND HER CHILDREN.    #[phone]

## 2024-06-18 NOTE — PROGRESS NOTES
Chief Complaint:  NEW PATIENT-RUQ PAIN    Primary Care Provider: Tatyana Disla APRN    Referring Provider: Tatyana Disla APRN    History of Present Illness  Rosie Bradley is a 33 y.o. female referred by FAHEEM Escobar for possible gallbladder removal.  For the past at least 6 months the patient has been having occasional right upper quadrant pain and epigastric pain.  She has occasional nausea and vomiting when the pain occurs.  The pain is now occurring at least 1 time a week.  She has woken up several times at night and almost went to the emergency room but the pain gradually resolved before she did so.  She was evaluated with gallbladder ultrasound.  See below.  Surgical history includes  x 2.  One was with a vertically oriented incision.    Gallbladder U/S, 24:  There is cholelithiasis without definite evidence of acute cholecystitis by sonographic criteria. Wall thickening is nonspecific but can be seen with chronic inflammation.    Allergies: Patient has no known allergies.    Outpatient Medications Marked as Taking for the 24 encounter (Office Visit) with Yonatan Gunn MD   Medication Sig Dispense Refill    atorvastatin (LIPITOR) 20 MG tablet Take 1 tablet by mouth Daily. 90 tablet 1    losartan (COZAAR) 25 MG tablet Take 1 tablet by mouth Daily.      NIFEdipine XL (Procardia XL) 60 MG 24 hr tablet Take 1 tablet by mouth Daily. 90 tablet 1       Past Medical History:    Cholelithiasis    Gestational diabetes    Hepatic steatosis    HSV infection    Irritable bowel syndrome (IBS)    Mixed hyperlipidemia    Obesity    Pre-diabetes    boarderline diabetes    Prediabetes    Preeclampsia    Previous  delivery, antepartum    Delivered at 36w6d due to gestational hypertension    Primary hypertension    Scoliosis    Vitamin D deficiency        Past Surgical History:     SECTION     SECTION WITH TUBAL    Procedure:  SECTION REPEAT WITH TUBAL;   "Surgeon: Parth Power MD;  Location: [facility] LABOR DELIVERY;  Service: Gynecology;  Laterality: N/A;    MANDIBLE FRACTURE SURGERY    TONSILLECTOMY    WISDOM TOOTH EXTRACTION       Family History:   Family History   Problem Relation Age of Onset    Diabetes Mother     Hypertension Sister     Ovarian cancer Neg Hx     Uterine cancer Neg Hx     Breast cancer Neg Hx     Colon cancer Neg Hx     Prostate cancer Neg Hx     Melanoma Neg Hx         Social History:  Social History     Tobacco Use    Smoking status: Never    Smokeless tobacco: Never   Substance Use Topics    Alcohol use: Never       Objective     Vital Signs:  /87 (BP Location: Right arm, Patient Position: Sitting, Cuff Size: Large Adult)   Pulse 100   Ht 149.9 cm (59.02\")   Wt 117 kg (259 lb)   BMI 52.28 kg/m²   Respiratory:  breathing not labored, respiratory effort appears normal  Cardiovascular:  heart regular rate  Abdomen:  soft, nontender, nondistended    Skin and subcutaneous tissue:  no jaundice  Musculoskeletal: moving all extremities symmetrically and purposefully  Neurologic:  no obvious motor or sensory deficits, alert & oriented x 3, speech clear  Psychiatric:  judgment and insight intact      Assessment:  Diagnoses and all orders for this visit:    1. Symptomatic cholelithiasis (Primary)        Plan:  Laparoscopic cholecystectomy with cholangiogram    Discussion: Indications, options, risks, benefits, and expected outcomes of planned surgery were discussed with the patient and she agrees to proceed.    Yonatan Gunn MD  06/18/2024    Electronically signed by Yonatan Gunn MD, 06/18/24, 7:43 AM EDT.      "

## 2024-06-25 ENCOUNTER — OFFICE VISIT (OUTPATIENT)
Dept: INTERNAL MEDICINE | Age: 34
End: 2024-06-25
Payer: MEDICARE

## 2024-06-25 VITALS
TEMPERATURE: 97.8 F | WEIGHT: 265 LBS | DIASTOLIC BLOOD PRESSURE: 80 MMHG | HEART RATE: 125 BPM | SYSTOLIC BLOOD PRESSURE: 115 MMHG | OXYGEN SATURATION: 95 % | BODY MASS INDEX: 53.42 KG/M2 | HEIGHT: 59 IN

## 2024-06-25 DIAGNOSIS — I10 PRIMARY HYPERTENSION: Primary | ICD-10-CM

## 2024-06-25 DIAGNOSIS — E78.2 MIXED HYPERLIPIDEMIA: ICD-10-CM

## 2024-06-25 DIAGNOSIS — E55.9 VITAMIN D DEFICIENCY: ICD-10-CM

## 2024-06-25 PROCEDURE — 99214 OFFICE O/P EST MOD 30 MIN: CPT | Performed by: NURSE PRACTITIONER

## 2024-06-25 PROCEDURE — 3079F DIAST BP 80-89 MM HG: CPT | Performed by: NURSE PRACTITIONER

## 2024-06-25 PROCEDURE — 3074F SYST BP LT 130 MM HG: CPT | Performed by: NURSE PRACTITIONER

## 2024-06-25 PROCEDURE — 1159F MED LIST DOCD IN RCRD: CPT | Performed by: NURSE PRACTITIONER

## 2024-06-25 PROCEDURE — 1160F RVW MEDS BY RX/DR IN RCRD: CPT | Performed by: NURSE PRACTITIONER

## 2024-06-25 RX ORDER — ERGOCALCIFEROL 1.25 MG/1
CAPSULE ORAL
Qty: 13 CAPSULE | Refills: 1 | Status: SHIPPED | OUTPATIENT
Start: 2024-06-25

## 2024-06-25 NOTE — PRE-PROCEDURE INSTRUCTIONS
PATIENT INSTRUCTED TO BE:    - NOTHING TO EAT AFTER MIDNIGHT OR CHEW, EXCEPT CAN HAVE CLEAR LIQUIDS 2 HOURS PRIOR TO SURGERY ARRIVAL TIME , NO MORE THAN 8 OZ. (NOTHING RED)     - TO HOLD ALL VITAMINS, SUPPLEMENTS, NSAIDS FOR ONE WEEK PRIOR TO THEIR SURGICAL PROCEDURE    - DO NOT TAKE ____N/A__________________ 7 DAYS PRIOR TO PROCEDURE PER ANESTHESIA RECOMMENDATIONS/INSTRUCTIONS     - INSTRUCTED PT TO USE SURGICAL SOAP 1 TIME THE NIGHT PRIOR TO SURGERY _2-87-05__________ OR THE AM OF SURGERY _9-29-47____________   USE THE SOAP FROM NECK TO TOES, AVOID THEIR FACE, HAIR, AND PRIVATE PARTS. IF USE THE SOAP THE NIGHT PRIOR TO SURGERY, CHANGE BED LINENS AND NO PETS IN THE BED.     INSTRUCTED NO LOTIONS, JEWELRY, PIERCINGS,  NAIL POLISH, OR DEODORANT DAY OF SURGERY    - IF DIABETIC, CHECK BLOOD GLUCOSE IF LESS THAN 70 OR HAVING SYMPTOMS CALL THE PREOP AREA FOR INSTRUCTIONS ON AM OF SURGERY ( [phone])    -INSTRUCTED TO TAKE THE FOLLOWING MEDICATIONS THE DAY OF SURGERY WITH SIPS OF WATER:            LIPITOR, PROCARDIA XL        - DO NOT BRING ANY MEDICATIONS WITH YOU TO THE HOSPITAL THE DAY OF SURGERY, EXCEPT IF USE INHALERS. BRING INHALERS DAY OF SURGERY       - BRING CPAP OR BIPAP TO THE HOSPITAL ONLY IF YOU ARE SPENDING THE NIGHT    - DO NOT SMOKE OR VAPE 24 HOURS PRIOR TO PROCEDURE PER ANESTHESIA REQUEST     -MAKE SURE YOU HAVE A RIDE HOME OR SOMEONE TO STAY WITH YOU THE DAY OF THE PROCEDURE AFTER YOU GO HOME     - FOLLOW ANY OTHER INSTRUCTIONS GIVEN TO YOU BY YOUR SURGEON'S OFFICE.     - DAY OF SURGERY _8-43-12___________, COME TO [facility]/ [facility], [facility] FLOOR. CHECK IN AT THE DESK FOR REGISTRATION/SURGERY    - YOU WILL RECEIVE A PHONE CALL THE DAY PRIOR TO SURGERY BETWEEN 1PM AND 4 PM WITH ARRIVAL TIME, IF YOUR SURGERY IS ON A MONDAY YOU WILL RECEIVE A CALL THE FRIDAY PRIOR TO SURGERY DATE    - BRING CASH OR CREDIT CARD FOR COPAYMENT OF MEDICATIONS AFTER SURGERY IF YOU USE THE HOSPITAL PHARMACY (MEDS TO  BED)    - PREADMISSION TESTING NURSE VELMA JOE -[phone] IF HAVE ANY QUESTIONS     -PATIENT PROVIDED THE NUMBER FOR PREOP SURGICAL DEPT IF HAD QUESTIONS AFTER HOURS PRIOR TO SURGERY  [phone]).  INFORMED PT IF NO ANSWER, LEAVE A MESSAGE AND SOMEONE WILL RETURN THEIR CALL       PATIENT VERBALIZED UNDERSTANDING       Clear Liquid Diet        Find out when you need to start a clear liquid diet.   Think of “clear liquids” as anything you could read a newspaper through. This includes things like water, broth, sports drinks, or tea WITHOUT any kind of milk or cream.           Once you are told to start a clear liquid diet, only drink these things until 2 hours before arrival to the hospital or when the hospital says to stop. Total volume limitation: 8 oz.       Clear liquids you CAN drink:   Water   Clear broth: beef, chicken, vegetable, or bone broth with nothing in it   Gatorade   Lemonade or Ytler-aid   Soda   Tea, coffee (NO cream or honey)   Jell-O (without fruit)   Popsicles (without fruit or cream)   Italian ices   Juice without pulp: apple, white, grape   You may use salt, pepper, and sugar  NO RED  NO NOODLES    Do NOT drink:   Milk or cream   Soy milk, almond milk, coconut milk, or other non-dairy drinks and   creamers   Milkshakes or smoothies   Tomato juice   Orange juice   Grapefruit juice   Cream soups or any other than broth         Clear Liquid Diet:  Do NOT eat any solid food.  Do NOT eat or suck on mints or candy.  Do NOT chew gum.  Do NOT drink thick liquids like milk or juice with pulp in it.  Do NOT add milk, cream, or anything like soy milk or almond milk to coffee or tea.

## 2024-06-26 ENCOUNTER — ANESTHESIA EVENT (OUTPATIENT)
Dept: PERIOP | Facility: HOSPITAL | Age: 34
End: 2024-06-26
Payer: MEDICARE

## 2024-06-27 ENCOUNTER — APPOINTMENT (OUTPATIENT)
Dept: GENERAL RADIOLOGY | Facility: HOSPITAL | Age: 34
End: 2024-06-27
Payer: MEDICARE

## 2024-06-27 ENCOUNTER — ANESTHESIA (OUTPATIENT)
Dept: PERIOP | Facility: HOSPITAL | Age: 34
End: 2024-06-27
Payer: MEDICARE

## 2024-06-27 ENCOUNTER — HOSPITAL ENCOUNTER (OUTPATIENT)
Facility: HOSPITAL | Age: 34
Setting detail: HOSPITAL OUTPATIENT SURGERY
Discharge: HOME | End: 2024-06-27
Attending: SURGERY | Admitting: SURGERY
Payer: MEDICARE

## 2024-06-27 VITALS
RESPIRATION RATE: 18 BRPM | OXYGEN SATURATION: 95 % | HEIGHT: 59 IN | TEMPERATURE: 98.1 F | DIASTOLIC BLOOD PRESSURE: 72 MMHG | SYSTOLIC BLOOD PRESSURE: 104 MMHG | HEART RATE: 77 BPM | BODY MASS INDEX: 53.2 KG/M2 | WEIGHT: 263.89 LBS

## 2024-06-27 DIAGNOSIS — K80.20 SYMPTOMATIC CHOLELITHIASIS: ICD-10-CM

## 2024-06-27 LAB
GLUCOSE BLDC GLUCOMTR-MCNC: 186 MG/DL (ref 70–99)
QT INTERVAL: 388 MS
QTC INTERVAL: 481 MS

## 2024-06-27 PROCEDURE — 88304 TISSUE EXAM BY PATHOLOGIST: CPT | Performed by: SURGERY

## 2024-06-27 PROCEDURE — 25010000002 ONDANSETRON PER 1 MG: Performed by: NURSE ANESTHETIST, CERTIFIED REGISTERED

## 2024-06-27 PROCEDURE — 25010000002 BUPIVACAINE (PF) 0.25 % SOLUTION: Performed by: SURGERY

## 2024-06-27 PROCEDURE — 25810000003 LACTATED RINGERS PER 1000 ML: Performed by: ANESTHESIOLOGY

## 2024-06-27 PROCEDURE — 25010000002 HYDROMORPHONE 1 MG/ML SOLUTION: Performed by: NURSE ANESTHETIST, CERTIFIED REGISTERED

## 2024-06-27 PROCEDURE — 25010000002 SUGAMMADEX 200 MG/2ML SOLUTION: Performed by: NURSE ANESTHETIST, CERTIFIED REGISTERED

## 2024-06-27 PROCEDURE — C1894 INTRO/SHEATH, NON-LASER: HCPCS | Performed by: SURGERY

## 2024-06-27 PROCEDURE — 47563 LAPARO CHOLECYSTECTOMY/GRAPH: CPT | Performed by: SPECIALIST/TECHNOLOGIST, OTHER

## 2024-06-27 PROCEDURE — 47563 LAPARO CHOLECYSTECTOMY/GRAPH: CPT | Performed by: SURGERY

## 2024-06-27 PROCEDURE — 25510000001 IOPAMIDOL PER 1 ML: Performed by: SURGERY

## 2024-06-27 PROCEDURE — 76000 FLUOROSCOPY <1 HR PHYS/QHP: CPT

## 2024-06-27 PROCEDURE — 93005 ELECTROCARDIOGRAM TRACING: CPT | Performed by: ANESTHESIOLOGY

## 2024-06-27 PROCEDURE — 25010000002 PROPOFOL 10 MG/ML EMULSION: Performed by: NURSE ANESTHETIST, CERTIFIED REGISTERED

## 2024-06-27 PROCEDURE — 25010000002 DEXAMETHASONE PER 1 MG: Performed by: NURSE ANESTHETIST, CERTIFIED REGISTERED

## 2024-06-27 PROCEDURE — 25010000002 FENTANYL CITRATE (PF) 50 MCG/ML SOLUTION: Performed by: NURSE ANESTHETIST, CERTIFIED REGISTERED

## 2024-06-27 PROCEDURE — 74300 X-RAY BILE DUCTS/PANCREAS: CPT

## 2024-06-27 PROCEDURE — 82948 REAGENT STRIP/BLOOD GLUCOSE: CPT

## 2024-06-27 PROCEDURE — C1889 IMPLANT/INSERT DEVICE, NOC: HCPCS | Performed by: SURGERY

## 2024-06-27 PROCEDURE — 25010000002 INDOCYANINE GREEN 25 MG RECONSTITUTED SOLUTION: Performed by: SURGERY

## 2024-06-27 PROCEDURE — 25010000002 MIDAZOLAM PER 1MG: Performed by: ANESTHESIOLOGY

## 2024-06-27 DEVICE — LIGACLIP 10-M/L, 10MM ENDOSCOPIC ROTATING MULTIPLE CLIP APPLIERS
Type: IMPLANTABLE DEVICE | Site: ABDOMEN | Status: FUNCTIONAL
Brand: LIGACLIP

## 2024-06-27 RX ORDER — SODIUM CHLORIDE 9 MG/ML
INJECTION, SOLUTION INTRAMUSCULAR; INTRAVENOUS; SUBCUTANEOUS AS NEEDED
Status: DISCONTINUED | OUTPATIENT
Start: 2024-06-27 | End: 2024-06-27 | Stop reason: HOSPADM

## 2024-06-27 RX ORDER — ONDANSETRON 2 MG/ML
4 INJECTION INTRAMUSCULAR; INTRAVENOUS ONCE AS NEEDED
Status: DISCONTINUED | OUTPATIENT
Start: 2024-06-27 | End: 2024-06-27 | Stop reason: HOSPADM

## 2024-06-27 RX ORDER — PROPOFOL 10 MG/ML
VIAL (ML) INTRAVENOUS AS NEEDED
Status: DISCONTINUED | OUTPATIENT
Start: 2024-06-27 | End: 2024-06-27 | Stop reason: SURG

## 2024-06-27 RX ORDER — ONDANSETRON 2 MG/ML
INJECTION INTRAMUSCULAR; INTRAVENOUS AS NEEDED
Status: DISCONTINUED | OUTPATIENT
Start: 2024-06-27 | End: 2024-06-27 | Stop reason: SURG

## 2024-06-27 RX ORDER — PROMETHAZINE HYDROCHLORIDE 12.5 MG/1
25 TABLET ORAL ONCE AS NEEDED
Status: DISCONTINUED | OUTPATIENT
Start: 2024-06-27 | End: 2024-06-27 | Stop reason: HOSPADM

## 2024-06-27 RX ORDER — MEPERIDINE HYDROCHLORIDE 25 MG/ML
12.5 INJECTION INTRAMUSCULAR; INTRAVENOUS; SUBCUTANEOUS
Status: DISCONTINUED | OUTPATIENT
Start: 2024-06-27 | End: 2024-06-27 | Stop reason: HOSPADM

## 2024-06-27 RX ORDER — SUCCINYLCHOLINE/SOD CL,ISO/PF 100 MG/5ML
SYRINGE (ML) INTRAVENOUS AS NEEDED
Status: DISCONTINUED | OUTPATIENT
Start: 2024-06-27 | End: 2024-06-27 | Stop reason: SURG

## 2024-06-27 RX ORDER — ACETAMINOPHEN 500 MG
1000 TABLET ORAL ONCE
Status: COMPLETED | OUTPATIENT
Start: 2024-06-27 | End: 2024-06-27

## 2024-06-27 RX ORDER — MAGNESIUM HYDROXIDE 1200 MG/15ML
LIQUID ORAL AS NEEDED
Status: DISCONTINUED | OUTPATIENT
Start: 2024-06-27 | End: 2024-06-27 | Stop reason: HOSPADM

## 2024-06-27 RX ORDER — LIDOCAINE HYDROCHLORIDE 20 MG/ML
INJECTION, SOLUTION EPIDURAL; INFILTRATION; INTRACAUDAL; PERINEURAL AS NEEDED
Status: DISCONTINUED | OUTPATIENT
Start: 2024-06-27 | End: 2024-06-27 | Stop reason: SURG

## 2024-06-27 RX ORDER — DEXMEDETOMIDINE HYDROCHLORIDE 100 UG/ML
INJECTION, SOLUTION INTRAVENOUS AS NEEDED
Status: DISCONTINUED | OUTPATIENT
Start: 2024-06-27 | End: 2024-06-27 | Stop reason: SURG

## 2024-06-27 RX ORDER — HYDROCODONE BITARTRATE AND ACETAMINOPHEN 5; 325 MG/1; MG/1
1 TABLET ORAL EVERY 6 HOURS PRN
Qty: 15 TABLET | Refills: 0 | Status: SHIPPED | OUTPATIENT
Start: 2024-06-27

## 2024-06-27 RX ORDER — FENTANYL CITRATE 50 UG/ML
INJECTION, SOLUTION INTRAMUSCULAR; INTRAVENOUS AS NEEDED
Status: DISCONTINUED | OUTPATIENT
Start: 2024-06-27 | End: 2024-06-27 | Stop reason: SURG

## 2024-06-27 RX ORDER — OXYCODONE HYDROCHLORIDE 5 MG/1
5 TABLET ORAL
Status: DISCONTINUED | OUTPATIENT
Start: 2024-06-27 | End: 2024-06-27 | Stop reason: HOSPADM

## 2024-06-27 RX ORDER — DEXAMETHASONE SODIUM PHOSPHATE 4 MG/ML
INJECTION, SOLUTION INTRA-ARTICULAR; INTRALESIONAL; INTRAMUSCULAR; INTRAVENOUS; SOFT TISSUE AS NEEDED
Status: DISCONTINUED | OUTPATIENT
Start: 2024-06-27 | End: 2024-06-27 | Stop reason: SURG

## 2024-06-27 RX ORDER — INDOCYANINE GREEN AND WATER 25 MG
2.5 KIT INJECTION
Status: COMPLETED | OUTPATIENT
Start: 2024-06-27 | End: 2024-06-27

## 2024-06-27 RX ORDER — MIDAZOLAM HYDROCHLORIDE 2 MG/2ML
2 INJECTION, SOLUTION INTRAMUSCULAR; INTRAVENOUS ONCE
Status: COMPLETED | OUTPATIENT
Start: 2024-06-27 | End: 2024-06-27

## 2024-06-27 RX ORDER — SODIUM CHLORIDE, SODIUM LACTATE, POTASSIUM CHLORIDE, CALCIUM CHLORIDE 600; 310; 30; 20 MG/100ML; MG/100ML; MG/100ML; MG/100ML
9 INJECTION, SOLUTION INTRAVENOUS CONTINUOUS PRN
Status: DISCONTINUED | OUTPATIENT
Start: 2024-06-27 | End: 2024-06-27 | Stop reason: HOSPADM

## 2024-06-27 RX ORDER — ROCURONIUM BROMIDE 10 MG/ML
INJECTION, SOLUTION INTRAVENOUS AS NEEDED
Status: DISCONTINUED | OUTPATIENT
Start: 2024-06-27 | End: 2024-06-27 | Stop reason: SURG

## 2024-06-27 RX ORDER — BUPIVACAINE HYDROCHLORIDE 2.5 MG/ML
INJECTION, SOLUTION EPIDURAL; INFILTRATION; INTRACAUDAL AS NEEDED
Status: DISCONTINUED | OUTPATIENT
Start: 2024-06-27 | End: 2024-06-27 | Stop reason: HOSPADM

## 2024-06-27 RX ORDER — PROMETHAZINE HYDROCHLORIDE 25 MG/1
25 SUPPOSITORY RECTAL ONCE AS NEEDED
Status: DISCONTINUED | OUTPATIENT
Start: 2024-06-27 | End: 2024-06-27 | Stop reason: HOSPADM

## 2024-06-27 RX ADMIN — METOPROLOL TARTRATE 25 MG: 25 TABLET, FILM COATED ORAL at 06:41

## 2024-06-27 RX ADMIN — FENTANYL CITRATE 50 MCG: 50 INJECTION, SOLUTION INTRAMUSCULAR; INTRAVENOUS at 07:31

## 2024-06-27 RX ADMIN — HYDROMORPHONE HYDROCHLORIDE 0.5 MG: 1 INJECTION, SOLUTION INTRAMUSCULAR; INTRAVENOUS; SUBCUTANEOUS at 09:35

## 2024-06-27 RX ADMIN — DEXMEDETOMIDINE 10 MCG: 100 INJECTION, SOLUTION INTRAVENOUS at 07:55

## 2024-06-27 RX ADMIN — SODIUM CHLORIDE, POTASSIUM CHLORIDE, SODIUM LACTATE AND CALCIUM CHLORIDE: 600; 310; 30; 20 INJECTION, SOLUTION INTRAVENOUS at 08:35

## 2024-06-27 RX ADMIN — PROPOFOL 200 MG: 10 INJECTION, EMULSION INTRAVENOUS at 07:35

## 2024-06-27 RX ADMIN — ROCURONIUM BROMIDE 15 MG: 10 INJECTION, SOLUTION INTRAVENOUS at 08:39

## 2024-06-27 RX ADMIN — ROCURONIUM BROMIDE 10 MG: 10 INJECTION, SOLUTION INTRAVENOUS at 07:35

## 2024-06-27 RX ADMIN — INDOCYANINE GREEN AND WATER 2.5 MG: KIT at 06:59

## 2024-06-27 RX ADMIN — DEXMEDETOMIDINE 10 MCG: 100 INJECTION, SOLUTION INTRAVENOUS at 08:02

## 2024-06-27 RX ADMIN — DEXMEDETOMIDINE 10 MCG: 100 INJECTION, SOLUTION INTRAVENOUS at 09:35

## 2024-06-27 RX ADMIN — OXYCODONE 5 MG: 5 TABLET ORAL at 10:21

## 2024-06-27 RX ADMIN — ONDANSETRON 4 MG: 2 INJECTION INTRAMUSCULAR; INTRAVENOUS at 09:55

## 2024-06-27 RX ADMIN — MIDAZOLAM HYDROCHLORIDE 2 MG: 1 INJECTION, SOLUTION INTRAMUSCULAR; INTRAVENOUS at 07:08

## 2024-06-27 RX ADMIN — ONDANSETRON HYDROCHLORIDE 4 MG: 2 SOLUTION INTRAMUSCULAR; INTRAVENOUS at 07:46

## 2024-06-27 RX ADMIN — SUGAMMADEX 200 MG: 100 INJECTION, SOLUTION INTRAVENOUS at 09:28

## 2024-06-27 RX ADMIN — Medication 100 MG: at 07:35

## 2024-06-27 RX ADMIN — DEXMEDETOMIDINE 10 MCG: 100 INJECTION, SOLUTION INTRAVENOUS at 08:41

## 2024-06-27 RX ADMIN — DEXMEDETOMIDINE 10 MCG: 100 INJECTION, SOLUTION INTRAVENOUS at 08:51

## 2024-06-27 RX ADMIN — SODIUM CHLORIDE, POTASSIUM CHLORIDE, SODIUM LACTATE AND CALCIUM CHLORIDE 9 ML/HR: 600; 310; 30; 20 INJECTION, SOLUTION INTRAVENOUS at 06:59

## 2024-06-27 RX ADMIN — ACETAMINOPHEN 1000 MG: 500 TABLET ORAL at 06:41

## 2024-06-27 RX ADMIN — ROCURONIUM BROMIDE 15 MG: 10 INJECTION, SOLUTION INTRAVENOUS at 09:09

## 2024-06-27 RX ADMIN — LIDOCAINE HYDROCHLORIDE 100 MG: 20 INJECTION, SOLUTION INTRAVENOUS at 07:35

## 2024-06-27 RX ADMIN — DEXMEDETOMIDINE 10 MCG: 100 INJECTION, SOLUTION INTRAVENOUS at 08:58

## 2024-06-27 RX ADMIN — DEXMEDETOMIDINE 10 MCG: 100 INJECTION, SOLUTION INTRAVENOUS at 08:06

## 2024-06-27 RX ADMIN — HYDROMORPHONE HYDROCHLORIDE 0.5 MG: 1 INJECTION, SOLUTION INTRAMUSCULAR; INTRAVENOUS; SUBCUTANEOUS at 10:21

## 2024-06-27 RX ADMIN — DEXMEDETOMIDINE 10 MCG: 100 INJECTION, SOLUTION INTRAVENOUS at 08:14

## 2024-06-27 RX ADMIN — ROCURONIUM BROMIDE 40 MG: 10 INJECTION, SOLUTION INTRAVENOUS at 07:38

## 2024-06-27 RX ADMIN — DEXMEDETOMIDINE 10 MCG: 100 INJECTION, SOLUTION INTRAVENOUS at 09:44

## 2024-06-27 RX ADMIN — DEXMEDETOMIDINE 10 MCG: 100 INJECTION, SOLUTION INTRAVENOUS at 09:09

## 2024-06-27 RX ADMIN — FENTANYL CITRATE 50 MCG: 50 INJECTION, SOLUTION INTRAMUSCULAR; INTRAVENOUS at 07:35

## 2024-06-27 RX ADMIN — DEXAMETHASONE SODIUM PHOSPHATE 4 MG: 4 INJECTION, SOLUTION INTRAMUSCULAR; INTRAVENOUS at 07:46

## 2024-06-27 NOTE — OP NOTE
Operative Report    Patient Name:  Rosie Bradley  YOB: 1990    Date of Surgery:  6/27/2024     Pre-op Diagnosis:   Symptomatic cholelithiasis [K80.20]       Post-op Diagnosis:   Acute on chronic calculous cholecystitis    Procedure:  CHOLECYSTECTOMY LAPAROSCOPIC WITH INTRAOPERATIVE CHOLANGIOGRAM    Staff:  Surgeon(s):  Yonatan Gunn MD    Assistant: Johnathan Koenig CSA    Anesthesia: General    Estimated Blood Loss: 75 mL    Complications:  None    Drains:  19 Uzbek    Packing:  None    Implants:    Implant Name Type Inv. Item Serial No.  Lot No. LRB No. Used Action   CLIPAPPLR M/ ENDO LIGACLIP ROT 10MM MD/CHRISTOPHER - [id number] Implant CLIPAPPLR M/ ENDO LIGACLIP ROT 10MM MD/CHRISTOPHER  [employer]  DIV OF J AND J [id number] N/A 1 Implanted       Specimen:          Specimens       ID Source Type Tests Collected By Collected At Frozen?    A Gallbladder Tissue TISSUE PATHOLOGY EXAM   Yonatan Gunn MD 6/27/24 0802 No    Description: Gallbladder and contents          Indications:  See my preop H&P note.     Findings: Gallbladder significantly inflamed with acute on chronic inflammatory changes.  Multiple various sized gallstones in the gallbladder.  Liver enlarged and with appearance consistent with a fatty liver.  Cholangiogram normal-appearing.    Description of Procedure:  Patient was taken to the operating room and placed supine on the operating table.  Timeout was performed.  General anesthesia was administered.  Abdomen was prepped and draped in the usual fashion.  Using my standard technique with a Veress needle to establish pneumoperitoneum and my standard four cannula sizes and locations on the abdominal wall, pneumoperitoneum was established and four cannulas were placed.  A laparoscope was inserted.  There was no evidence of injury to any intra-abdominal structures from using the Veress needle from placing the cannulas.  Patient was positioned head up and rotated toward the left  side.  Attention was focused in the right upper quadrant.  Liver was enlarged and with an appearance consistent with a fatty liver.  Gallbladder initially was not visible.  It was completely encased in omental adhesions.  I cleared the adhesions from the fundus of the gallbladder and then used a grasper to elevate the gallbladder superiorly.  I then used hook cautery and a [place] LigaSure to take down the remaining omental adhesions to the gallbladder.  A grasper was placed on the infundibulum of the gallbladder and used to apply lateral and inferior retraction.  Tissue in the triangle of Calot was dissected until I achieved a critical view of safety.  A Beep clamp and Beep cholangiogram catheter were used to perform a cholangiogram.  The cholangiogram showed normal flow of contrast into the duodenum and the intrahepatic ducts without any filling defects or abnormalities, and the findings confirmed my critical view of safety.  The Beep cholangiogram equipment was removed.  The cystic duct was clipped 3 times proximally and once distally.  The cystic artery was clipped proximally and distally as well.  The artery and duct were then divided between the proximal and distal clips..  Hook electrocautery was used to cauterize the cholecystohepatic attachments until the gallbladder was freed from the liver.  The dissection of the gallbladder from the liver was fairly difficult given significant inflammatory changes.  Nevertheless, the dissection went well.  ICG overlay was used intermittently throughout the dissection and no accessory ducts were seen.  The gallbladder was placed in an Endo Catch bag and removed from the abdomen and sent to pathology.  The gallbladder fossa was examined.  There was no bleeding.  There was no leakage of bile.  19 Swiss drain was positioned in the abdominal cavity.  The extra-abdominal portion of the drain was exteriorized through the right lateral most cannula as that cannula was  removed.  The intra-abdominal portion the drain was positioned in the gallbladder fossa.  The drain was secured to the skin with a suture.  The patient was positioned flat.  The midline epigastric cannula fascial opening was closed with two interrupted 0 Vicryl sutures using a suture passer.  Pneumoperitoneum was released and all of the laparoscopic equipment was removed.  The skin incisions were closed with buried absorbable suture followed by appropriate dressings.  No complications.  Patient did well during the procedure and was transported to the recovery area in stable condition.  Sponge, needle, and instrument counts were correct.    Assistant: Johnathan Koenig CSA was responsible for performing the following activities: closing, placing dressing and operating laparoscope during the surgery, and his skilled assistance was necessary for the success of this case.      Yonatan Gunn MD     Date: 6/27/2024  Time: 09:43 EDT    Electronically signed by Yonatan Gunn MD, 06/27/24, 9:43 AM EDT.

## 2024-06-27 NOTE — DISCHARGE INSTRUCTIONS
DISCHARGE INSTRUCTIONS LAPAROSCOPIC CHOLECYSTECTOMY/APPENDECTOMY  (GALL BLADDER)      For your surgery you had:  General anesthesia (you may have a sore throat for the first 24 hours)  IV sedation  Local anesthesia  Monitored anesthesia care  You received an anesthesia medication today that can cause hormonal forms of birth control to be ineffective. You should use a different form of birth control (to prevent pregnancy) for 7 days.   You may experience dizziness, drowsiness, or light-headedness for several hours following surgery.  Do not stay alone tonight.  Limit your activity for 24 hours.  Resume your diet slowly.  Follow whatever special dietary instructions you may have been given by your doctor.  You should not drive, operate machinery, drink alcohol, or sign legally binding documents for 24 hours or while you are taking pain medication.  Last dose of pain medication was given at:   .    NOTIFY YOUR DOCTOR IF YOU EXPERIENCE ANY OF THE FOLLOWING:  Temperature greater than 101 degrees Fahrenheit  Shaking Chills  Redness or excessive drainage from incision  Nausea, vomiting and/or pain that is not controlled by prescribed medications  Increase in bleeding or bleeding that is excessive  Unable to urinate in 6 hours after surgery  If unable to reach your doctor, please go to the closest Emergency Room .  You may shower tomorrow  Apply an ice pack 24-48 hours.  You may experience gas discomfort 24-48 hours after discharge, especially in chest and shoulders.  Changing position frequently may alleviate this discomfort.  If you have excessive pain, swelling, redness, drainage or other problems, notify your physician.  If unable to urinate in 6 to 8 hours after surgery or urinating frequently in small amounts, notify your doctor or go to the nearest Emergency Room.  Medications per physician instructions as indicated on Discharge Medication Information Sheet.  You should see   for follow-up care  on  .  Phone  number:      SPECIAL INSTRUCTIONS:                        I have read and received the above instructions.     Patient/Responsible Party's Signature Date/Time     RN Signature Date/Time  Dr. Gunn's Instructions          DIET  Gradually increase your dietary intake.  Although you will likely feel very hungry after surgery, do not eat too much for the first 12 to 24 hours.  Begin with a bland diet, such as chicken noodle soup, crackers, gatorade or tea, and gradually work your way up to a normal diet.     ACTIVITY & RETURN TO WORK  When you first get home from the hospital, it is important that you get up and move around your house.  For the next four weeks, you should avoid any strenuous physical activity and you should not lift anything heavier than 15 pounds.  Walking up stairs and walking short distances for exercise are acceptable activities.  After four weeks, you have no activity restrictions and may gradually increase your activities using common sense.  You are excused from work for four weeks but you may return to work anytime before then should you feel able to do so and you can abide by the lifting restriction.     WOUND CARE & SHOWERING/BATHING  Your incisions are covered with a plastic type material that will flake off on its own in the next few weeks.  If the plastic type material has not flaked off on its own by 2 weeks after your surgery then use tweezers to pull it off.  You have sutures in your incisions but they are placed below the level of the skin and they will slowly dissolve (they do not need to be removed).  The skin around your incisions will likely have some bruising.  This is normal.  You can shower beginning tomorrow but wait two weeks after your surgery before taking any tub baths.  Also, do not get in hot tubs, swimming pools, or lake water for two weeks.    DRAIN CARE  Care for the drain as you are instructed by the nursing staff at the hospital.  Note, if the drain bulb is fully  expanded then the drain is not working.  The nursing staff will explain this to you.     PAIN CONTROL  You will receive a narcotic pain medicine prescription before you are discharged home.  Be sure to take the narcotic pain medication with some food so as not to upset your stomach.  Do not drive while you are taking the prescription pain medication.  Also, take 3 tablets of Motrin 200 mg (total of 600mg) every 8 hours for the next 3 days & then discontinue.  Advil and ibuprofen work the same as Motrin so you can use the same dose of either one of them instead of Motrin.  Some patients find that using an ice pack (a package of frozen corn or peas works well) for the first two days after surgery helps reduce pain.  If you decide to use an ice pack, apply it for 20 minutes and then remove it for 20 minutes.  Do this 4 or 5 times each day for only the first two or three days after surgery.  You will likely have some shoulder pain (especially the right shoulder).  This is caused by the air used to inflate your abdomen during surgery and will go away on its own in 24 to 48 hours.     BOWEL MOVEMENTS  It is not unusual for narcotic pain medications to cause constipation.  Also, the medications and anesthesia you received for your surgery can have a constipating effect.  To help avoid constipation, drink at least four eight-ounce glasses of water each day and use over-the-counter laxatives/stool softeners (dulcolax, milk of magnesia, senokot, etc.).  I recommend drinking the aforementioned amount of water daily and taking 30 ml of milk of magnesia two times each day while you are using the prescribed narcotic pain medication.  If your bowel movements become too loose or too frequent, then simply stop following these recommendations unless you start to feel constipated.     FOLLOW-UP VISIT & QUESTIONS/CONCERNS  Call Dr. Gunn's office at [phone] and schedule a follow-up appointment on Tuesday, July 2.  Should you have  any questions or concerns, have a temperature over 101 degrees, worsening abdominal pain, persistent nausea or vomiting, or any other problems you think need medical attention, please call Dr. Gunn's office or go to the emergency room.

## 2024-06-27 NOTE — ANESTHESIA POSTPROCEDURE EVALUATION
Patient: Rosie Bradley    Procedure Summary       Date: 06/27/24 Room / Location: [facility] OR  / [facility]    Anesthesia Start: 0727 Anesthesia Stop: 0949    Procedure: CHOLECYSTECTOMY LAPAROSCOPIC WITH INTRAOPERATIVE CHOLANGIOGRAM AND INSERTION OF DRAIN (Abdomen) Diagnosis:       Symptomatic cholelithiasis      (Symptomatic cholelithiasis [K80.20])    Surgeons: Yonatan Gunn MD Provider: Shannon Schofield MD    Anesthesia Type: general ASA Status: 3            Anesthesia Type: general    Vitals  Vitals Value Taken Time   /72 06/27/24 1050   Temp 36.7 °C (98.1 °F) 06/27/24 1050   Pulse 74 06/27/24 1055   Resp 18 06/27/24 1050   SpO2 95 % 06/27/24 1055   Vitals shown include unfiled device data.        Post Anesthesia Care and Evaluation    Patient location during evaluation: bedside  Patient participation: complete - patient participated  Level of consciousness: awake  Pain management: adequate    Airway patency: patent  PONV Status: none  Cardiovascular status: acceptable and stable  Respiratory status: acceptable  Hydration status: acceptable

## 2024-06-27 NOTE — ANESTHESIA PREPROCEDURE EVALUATION
Anesthesia Evaluation     Patient summary reviewed and Nursing notes reviewed   no history of anesthetic complications:   NPO Solid Status: > 8 hours  NPO Liquid Status: > 2 hours           Airway   Mallampati: II  TM distance: >3 FB  Neck ROM: full  No difficulty expected  Dental      Comment: Poor dentition with multiple missing and chipped; patient denies any loose      Pulmonary - negative pulmonary ROS and normal exam    breath sounds clear to auscultation  Cardiovascular - normal exam  Exercise tolerance: good (4-7 METS)    Rhythm: regular  Rate: normal    (+) hypertension (did not take bp meds this morning), hyperlipidemia      Neuro/Psych- negative ROS  GI/Hepatic/Renal/Endo    (+) morbid obesity (bmi 53), liver disease, diabetes mellitus (prediabetes, no meds)    Musculoskeletal (-) negative ROS    Abdominal   (+) obese   Substance History - negative use     OB/GYN negative ob/gyn ROS         Other - negative ROS       ROS/Med Hx Other: PAT Nursing Notes unavailable.                     Anesthesia Plan    ASA 3     general     (Patient understands anesthesia not responsible for dental damage.)  intravenous induction     Anesthetic plan, risks, benefits, and alternatives have been provided, discussed and informed consent has been obtained with: patient.    Plan discussed with CRNA.        CODE STATUS:          Cervical disc herniation  01/15/2019    Active  Braulio Vazquez

## 2024-07-02 ENCOUNTER — OFFICE VISIT (OUTPATIENT)
Dept: SURGERY | Facility: CLINIC | Age: 34
End: 2024-07-02
Payer: MEDICARE

## 2024-07-02 VITALS
RESPIRATION RATE: 16 BRPM | BODY MASS INDEX: 52.82 KG/M2 | DIASTOLIC BLOOD PRESSURE: 93 MMHG | HEART RATE: 103 BPM | HEIGHT: 59 IN | WEIGHT: 262 LBS | SYSTOLIC BLOOD PRESSURE: 163 MMHG

## 2024-07-02 DIAGNOSIS — Z09 ENCOUNTER FOR FOLLOW-UP: Primary | ICD-10-CM

## 2024-07-02 PROCEDURE — 1159F MED LIST DOCD IN RCRD: CPT | Performed by: SURGERY

## 2024-07-02 PROCEDURE — 99024 POSTOP FOLLOW-UP VISIT: CPT | Performed by: SURGERY

## 2024-07-02 PROCEDURE — 3077F SYST BP >= 140 MM HG: CPT | Performed by: SURGERY

## 2024-07-02 PROCEDURE — 3080F DIAST BP >= 90 MM HG: CPT | Performed by: SURGERY

## 2024-07-02 PROCEDURE — 1160F RVW MEDS BY RX/DR IN RCRD: CPT | Performed by: SURGERY

## 2024-07-02 NOTE — PROGRESS NOTES
Patient is here for follow-up after laparoscopic gallbladder removal.  Surgery was scheduled as an elective procedure but patient's gallbladder was significantly inflamed and findings were consistent with acute calculus cholecystitis.  However, the surgery went well.  I placed a drain.  Patient is doing reasonably well.  She does not have any concerning complaints.  Drain output has been less than 30 mL per 24 hours and the fluid in the drain bulb is serosanguineous.  Abdominal exam is benign.  I removed the drain today without difficulty.  My assessment is patient is recovering satisfactorily thus far.  I will have her see me again in 2 weeks.

## 2024-07-04 LAB
QT INTERVAL: 388 MS
QTC INTERVAL: 481 MS

## 2024-07-10 RX ORDER — LOSARTAN POTASSIUM 25 MG/1
25 TABLET ORAL DAILY
Qty: 30 TABLET | Refills: 0 | OUTPATIENT
Start: 2024-07-10

## 2024-07-25 ENCOUNTER — TELEPHONE (OUTPATIENT)
Dept: SURGERY | Facility: CLINIC | Age: 34
End: 2024-07-25
Payer: MEDICARE

## 2024-07-25 NOTE — TELEPHONE ENCOUNTER
CALLED PT TO OFFER R/S OF CX'D APPT 7/23 KUNAL/ KALE    SPOKE W/ PT WHO DECLINED TO R/S AND STATED DR. HENLEY LET HER KNOW IF SHE HAS SCABBED OVER, SHE DOES NOT NEED TO R/S.    ANYTHING ELSE TO DO?

## 2025-03-27 ENCOUNTER — TELEMEDICINE (OUTPATIENT)
Dept: FAMILY MEDICINE CLINIC | Facility: TELEHEALTH | Age: 35
End: 2025-03-27
Payer: MEDICARE

## 2025-03-27 DIAGNOSIS — R30.0 DYSURIA: Primary | ICD-10-CM

## 2025-03-27 RX ORDER — FLUCONAZOLE 150 MG/1
150 TABLET ORAL
Qty: 2 TABLET | Refills: 0 | Status: SHIPPED | OUTPATIENT
Start: 2025-03-27

## 2025-03-27 RX ORDER — NITROFURANTOIN 25; 75 MG/1; MG/1
100 CAPSULE ORAL 2 TIMES DAILY
Qty: 10 CAPSULE | Refills: 0 | Status: SHIPPED | OUTPATIENT
Start: 2025-03-27 | End: 2025-04-01

## 2025-03-27 NOTE — PROGRESS NOTES
HPI  Rosie Bradley is a 34 y.o. female  presents with complaint of urinary burning, frequency, urgency, vaginal irritation x2 days. Denies fever, chills, sweats, back pain, abd pain, N/V/D, vaginal discharge.     Review of Systems    Past Medical History:   Diagnosis Date    Cholelithiasis     Gestational diabetes     WITH PREGNANCY- NO PROBLEMS SINCE THEN    Hepatic steatosis 2024    HSV infection 2022    Irritable bowel syndrome (IBS) 2024    Mixed hyperlipidemia 2024    Obesity     Preeclampsia     Previous  delivery, antepartum 2022    Delivered at 36w6d due to gestational hypertension    Primary hypertension 2024    Scoliosis     Vitamin D deficiency 2024       Family History   Problem Relation Age of Onset    Diabetes Mother     Hypertension Sister     Ovarian cancer Neg Hx     Uterine cancer Neg Hx     Breast cancer Neg Hx     Colon cancer Neg Hx     Prostate cancer Neg Hx     Melanoma Neg Hx        Social History     Socioeconomic History    Marital status: Significant Other    Number of children: 1   Tobacco Use    Smoking status: Never    Smokeless tobacco: Never   Vaping Use    Vaping status: Never Used   Substance and Sexual Activity    Alcohol use: Yes     Comment: social    Drug use: Never    Sexual activity: Defer     Partners: Male     Birth control/protection: Tubal ligation         There were no vitals taken for this visit.    PHYSICAL EXAM  Physical Exam   Constitutional: She appears well-developed and well-nourished.   HENT:   Head: Normocephalic.   Nose: Nose normal.   Neck: Neck normal appearance.  Pulmonary/Chest: Effort normal.   Neurological: She is alert.   Psychiatric: She has a normal mood and affect. Her speech is normal.       Diagnoses and all orders for this visit:    1. Dysuria (Primary)  -     nitrofurantoin, macrocrystal-monohydrate, (Macrobid) 100 MG capsule; Take 1 capsule by mouth 2 (Two) Times a Day for 5 days.  Dispense: 10  capsule; Refill: 0  -     fluconazole (Diflucan) 150 MG tablet; Take 1 tablet by mouth Every 72 (Seventy-Two) Hours As Needed (yeast).  Dispense: 2 tablet; Refill: 0          FOLLOW-UP  As discussed during visit with [facility], if symptoms worsen or fail to improve, follow-up with PCP/Urgent Care/Emergency Department.    Patient verbalizes understanding of medications, instructions for treatment and follow-up.    Kaley Beckham, APRN  03/27/2025  17:29 EDT      Mode of Visit: Video  Location of patient: -HOME-  Location of provider: Home  You have chosen to receive care through a telehealth visit.  The patient has signed the video visit consent form.  The visit included audio and video interaction. No technical issues occurred during this visit.

## 2025-04-10 ENCOUNTER — TELEMEDICINE (OUTPATIENT)
Dept: FAMILY MEDICINE CLINIC | Facility: TELEHEALTH | Age: 35
End: 2025-04-10
Payer: MEDICARE

## 2025-04-10 DIAGNOSIS — S02.5XXB OPEN FRACTURE OF TOOTH, INITIAL ENCOUNTER: Primary | ICD-10-CM

## 2025-04-10 RX ORDER — CEPHALEXIN 500 MG/1
500 CAPSULE ORAL 4 TIMES DAILY
Qty: 40 CAPSULE | Refills: 0 | Status: SHIPPED | OUTPATIENT
Start: 2025-04-10 | End: 2025-04-20

## 2025-04-10 RX ORDER — FLUCONAZOLE 150 MG/1
TABLET ORAL
Qty: 1 TABLET | Refills: 0 | Status: SHIPPED | OUTPATIENT
Start: 2025-04-10

## 2025-04-10 NOTE — PROGRESS NOTES
You have chosen to receive care through a telehealth visit.  Do you consent to use a video/audio connection for your medical care today? Yes     HPI  History of Present Illness  The patient is a 34-year-old female who presents via virtual visit for evaluation of a toothache.    She reports a recent onset of toothache, which she attributes to a broken tooth that occurred a few days prior. The pain started last night and is accompanied by mild swelling. She has been unable to seek dental care due to childcare constraints. She has been attempting to manage the pain with ibuprofen and Tylenol, but these have proven ineffective. She has also tried Orajel, which provides temporary relief. She has no known allergies and has never taken Keflex. She is not currently pregnant and has undergone tubal ligation. She does not require a work note at this time. A few weeks ago, she experienced a similar toothache on the opposite side of her mouth, for which she was prescribed amoxicillin. However, this medication led to the development of a urinary tract infection (UTI) or yeast infection, marking the first instance of such an occurrence with any medication. She did not receive Diflucan during her previous treatment.    SOCIAL HISTORY  She does not smoke.    ALLERGIES  The patient has no known allergies.    MEDICATIONS  Current: ibuprofen, Tylenol  Past: amoxicillin    Review of Systems: See HPI    Past Medical History:   Diagnosis Date    Cholelithiasis     Gestational diabetes     WITH PREGNANCY- NO PROBLEMS SINCE THEN    Hepatic steatosis 2024    HSV infection 2022    Irritable bowel syndrome (IBS) 2024    Mixed hyperlipidemia 2024    Obesity     Preeclampsia     Previous  delivery, antepartum 2022    Delivered at 36w6d due to gestational hypertension    Primary hypertension 2024    Scoliosis     Vitamin D deficiency 2024       Family History   Problem Relation Age of Onset     Diabetes Mother     Hypertension Sister     Ovarian cancer Neg Hx     Uterine cancer Neg Hx     Breast cancer Neg Hx     Colon cancer Neg Hx     Prostate cancer Neg Hx     Melanoma Neg Hx        Social History     Socioeconomic History    Marital status: Significant Other    Number of children: 1   Tobacco Use    Smoking status: Never    Smokeless tobacco: Never   Vaping Use    Vaping status: Never Used   Substance and Sexual Activity    Alcohol use: Yes     Comment: social    Drug use: Never    Sexual activity: Defer     Partners: Male     Birth control/protection: Tubal ligation         There were no vitals taken for this visit.    PHYSICAL EXAM  Physical Exam   Constitutional: She is oriented to person, place, and time. She appears well-developed and well-nourished. She does not have a sickly appearance. She does not appear ill. No distress.   HENT:   Head: Normocephalic and atraumatic.   Right Ear: Hearing normal.   Left Ear: Hearing normal.   Nose: Nose normal.   Mouth/Throat: Abnormal dentition.       Broken tooth on left upper back side. Unable to visualize.    Pulmonary/Chest: Effort normal.   Lymphadenopathy:     She has no cervical adenopathy.   Neurological: She is alert and oriented to person, place, and time.   Psychiatric: She has a normal mood and affect.   Vitals reviewed.    Physical Exam      Diagnoses and all orders for this visit:    1. Open fracture of tooth, initial encounter (Primary)  -     cephalexin (KEFLEX) 500 MG capsule; Take 1 capsule by mouth 4 (Four) Times a Day for 10 days.  Dispense: 40 capsule; Refill: 0  -     fluconazole (Diflucan) 150 MG tablet; Take one after antibiotic therapy and may repeat in 3 days prn symptoms persisting.  Dispense: 1 tablet; Refill: 0      Assessment & Plan  1. Dental pain.  She reports a broken tooth causing significant pain and swelling, which started last night. She has been using ibuprofen and Tylenol without sufficient relief. She is advised to take  ibuprofen 600 mg every 6 hours, alternating with Tylenol 500 mg. Ice application is recommended if swelling occurs. She should perform warm salt water gargles and gently brush the affected tooth. It is crucial for her to seek immediate dental care to address the issue definitively. A prescription for Keflex, to be taken 3 times daily, will be provided. Additionally, Diflucan will be prescribed to prevent potential yeast infections, a side effect she previously experienced with antibiotics. She should consume yogurt or probiotics to maintain gut health and drink 6 to 8 glasses of water daily while on the antibiotic course. The prescription will be sent to [facility] in [place].    PROCEDURE  The patient has undergone tubal ligation in the past.      FOLLOW-UP  As discussed during visit with [facility], if symptoms worsen or fail to improve, follow-up with PCP/Urgent Care/Emergency Department.    Patient verbalizes understanding of medications, instructions for treatment and follow-up.    Patient or patient representative verbalized consent for the use of Ambient Listening during the visit with  FAHEEM Ramirez for chart documentation. 4/10/2025  10:44 EDT    FAHEEM Ramirez  04/10/2025  10:44 EDT    The use of a video visit has been reviewed with the patient and verbal informed consent has been obtained. Myself and Rosie Bradley participated in this visit. The patient is located in [facility], and I am located in [place], [place]. [employer] and [employer] were utilized.

## (undated) DEVICE — PAD GRND REM POLYHESIVE A/ DISP

## (undated) DEVICE — SUT PDS 0 CT-1 Z340H

## (undated) DEVICE — C SECTION PACK: Brand: MEDLINE INDUSTRIES, INC.

## (undated) DEVICE — COVER,C-ARM,41X74: Brand: MEDLINE

## (undated) DEVICE — VIOLET BRAIDED (POLYGLACTIN 910), SYNTHETIC ABSORBABLE SUTURE: Brand: COATED VICRYL

## (undated) DEVICE — MARYLAND JAW LAPAROSCOPIC SEALER/DIVIDER COATED: Brand: LIGASURE

## (undated) DEVICE — GOWN,REINFORCE,POLY,SIRUS,BREATH SLV,XLG: Brand: MEDLINE

## (undated) DEVICE — INTENDED FOR TISSUE SEPARATION, AND OTHER PROCEDURES THAT REQUIRE A SHARP SURGICAL BLADE TO PUNCTURE OR CUT.: Brand: BARD-PARKER ® CARBON RIB-BACK BLADES

## (undated) DEVICE — DEV TRANSF BLD W/LUER ADPT CA/198

## (undated) DEVICE — SUT ETHLN 3-0 FS118IN 663H

## (undated) DEVICE — BLAKE SILICONE DRAIN, 19 FR ROUND, HUBLESS WITH 1/4" TROCAR: Brand: BLAKE

## (undated) DEVICE — LAPAROSCOPIC ELECTRODE WITH A 3/32" PIN CONNECTOR, L-HOOK 5 MM X 27 CM: Brand: CONMED

## (undated) DEVICE — SUT MNCRYL 4/0 PS2 18 IN

## (undated) DEVICE — DECANTER: Brand: UNBRANDED

## (undated) DEVICE — ENDOPATH XCEL WITH OPTIVIEW TECHNOLOGY UNIVERSAL TROCAR STABILITY SLEEVES: Brand: ENDOPATH XCEL OPTIVIEW

## (undated) DEVICE — THE KUMAR CATHETER®, USED IN CONJUNCTION WITH KUMAR CHOLANGIOGRAPHY® CLAMP, IS MEANT TO PROVIDE A MEANS OF LAPAROSCOPIC CHOLANGIOGRAPHY. IT COMPRISES A TRANSLUCENT TUBING ( 76 CM. LENGTH AND 16 GA. ) THAT CARRIES A 19 GA., 1.25 CM LONG NEEDLE AT THE END. THE KUMAR CATHETER® IS USED TO PUNCTURE THE HARTMANN'S POUCH OF THE GALLBLADDER FOR BILIARY ACCESS AND / OR ASPIRATION. PRODUCT IS LATEX FREE.: Brand: KUMAR CATHETER®

## (undated) DEVICE — SOL NACL 0.9PCT 1000ML

## (undated) DEVICE — ADHS SKIN SURG TISS VISC PREMIERPRO EXOFIN HI/VISC FAST/DRY

## (undated) DEVICE — NEEDLE,18GX1.5",REG,BEVEL: Brand: MEDLINE

## (undated) DEVICE — ENDOPATH XCEL WITH OPTIVIEW TECHNOLOGY BLADELESS TROCARS WITH STABILITY SLEEVES: Brand: ENDOPATH XCEL OPTIVIEW

## (undated) DEVICE — GLV SURG BIOGEL LTX PF 7 1/2

## (undated) DEVICE — SUT VIC 3/0 CT1 27IN DYED J338H

## (undated) DEVICE — GENERAL LAPAROSCOPY-LF: Brand: MEDLINE INDUSTRIES, INC.

## (undated) DEVICE — INTRO PERITONEAL CHOLANGR TAUT 4.5F 1.6MM 3.0IN

## (undated) DEVICE — STERILE POLYISOPRENE POWDER-FREE SURGICAL GLOVES WITH EMOLLIENT COATING: Brand: PROTEXIS

## (undated) DEVICE — CVR HNDL LT SURG ACCSSRY BLU STRL

## (undated) DEVICE — SLV SCD KN/LEN ADJ EXPRSS BLENDED MD 1P/U

## (undated) DEVICE — LAP-PORT CLOSURE DEVICE GUIDES 5MM AND 10/12 MM: Brand: LAP-PORT CLOSURE DEVICE GUIDES 5MM AND 10/12 MM

## (undated) DEVICE — LAPAROSCOPIC SMOKE EVACUATION SYSTEM ACTIVE AND PASSIVE: Brand: VALLEYLAB

## (undated) DEVICE — LAPAROVUE VISIBILITY SYSTEM LAPAROSCOPIC SOLUTIONS: Brand: LAPAROVUE

## (undated) DEVICE — SUT VIC PLS CTD BR 0 TIE 18IN VIL

## (undated) DEVICE — CONTAINER,SPECIMEN,O.R.STRL,4.5OZ: Brand: MEDLINE

## (undated) DEVICE — SYR LUERLOK 30CC

## (undated) DEVICE — SUT MNCRYL 0/0 CTX 36IN Y398H

## (undated) DEVICE — TISSUE RETRIEVAL SYSTEM: Brand: INZII RETRIEVAL SYSTEM

## (undated) DEVICE — LAPAROSCOPIC DISSECTOR: Brand: DEROYAL

## (undated) DEVICE — SUT MONOCRYL 4/0 PS2 27IN Y426H ETY426H

## (undated) DEVICE — TRY CATH FOL ADVANCE SIL W/BAG 16F

## (undated) DEVICE — SOL IRR NACL 0.9PCT BT 1000ML

## (undated) DEVICE — 3 RING SUTURE PASSER - 16 CM: Brand: 3 RING SUTURE PASSER - 16 CM

## (undated) DEVICE — PENCL ES MEGADINE EZ/CLEAN BUTN W/HOLSTR 10FT

## (undated) DEVICE — 2, DISPOSABLE SUCTION/IRRIGATOR WITHOUT DISPOSABLE TIP: Brand: STRYKEFLOW

## (undated) DEVICE — GLV SURG SENSICARE PI ORTHO PF SZ7 LF STRL

## (undated) DEVICE — SUT GUT CHRM 0 CTX 27IN 864H

## (undated) DEVICE — JACKSON-PRATT 100CC BULB RESERVOIR: Brand: CARDINAL HEALTH

## (undated) DEVICE — ENDOPATH PNEUMONEEDLE INSUFFLATION NEEDLES WITH LUER LOCK CONNECTORS 120MM: Brand: ENDOPATH